# Patient Record
Sex: FEMALE | Race: BLACK OR AFRICAN AMERICAN | Employment: OTHER | URBAN - METROPOLITAN AREA
[De-identification: names, ages, dates, MRNs, and addresses within clinical notes are randomized per-mention and may not be internally consistent; named-entity substitution may affect disease eponyms.]

---

## 2017-02-17 ENCOUNTER — HOSPITAL ENCOUNTER (OUTPATIENT)
Dept: LAB | Age: 67
Discharge: HOME | End: 2017-02-17
Payer: MEDICARE

## 2017-02-17 PROCEDURE — 83036 HEMOGLOBIN GLYCOSYLATED A1C: CPT

## 2017-02-17 PROCEDURE — 36415 COLL VENOUS BLD VENIPUNCTURE: CPT

## 2017-02-17 PROCEDURE — 82043 UR ALBUMIN QUANTITATIVE: CPT

## 2017-02-17 PROCEDURE — 80061 LIPID PANEL: CPT

## 2017-02-17 PROCEDURE — 80053 COMPREHEN METABOLIC PANEL: CPT

## 2017-02-18 LAB
ALBUMIN SERPL-MCNC: 4.4 G/DL (ref 3.6–4.8)
ALBUMIN/CREAT UR: 40.9 MG/G CREAT (ref 0–30)
ALBUMIN/GLOB SERPL: 1.5 {RATIO} (ref 1.1–2.5)
ALP SERPL-CCNC: 189 IU/L (ref 39–117)
ALT SERPL-CCNC: 45 IU/L (ref 0–32)
AST SERPL-CCNC: 19 IU/L (ref 0–40)
BILIRUB SERPL-MCNC: 0.3 MG/DL (ref 0–1.2)
BUN SERPL-MCNC: 18 MG/DL (ref 8–27)
BUN/CREAT SERPL: 16 (ref 11–26)
CALCIUM SERPL-MCNC: 9.6 MG/DL (ref 8.7–10.3)
CHLORIDE SERPL-SCNC: 103 MMOL/L (ref 96–106)
CHOLEST SERPL-MCNC: 182 MG/DL (ref 100–199)
CO2 SERPL-SCNC: 21 MMOL/L (ref 18–29)
CREAT SERPL-MCNC: 1.12 MG/DL (ref 0.57–1)
CREAT UR-MCNC: 147.5 MG/DL
EST. AVERAGE GLUCOSE BLD GHB EST-MCNC: 166 MG/DL
GLOBULIN SER CALC-MCNC: 2.9 G/DL (ref 1.5–4.5)
GLUCOSE SERPL-MCNC: 179 MG/DL (ref 65–99)
HBA1C MFR BLD: 7.4 % (ref 4.8–5.6)
HDLC SERPL-MCNC: 47 MG/DL
INTERPRETATION, 910389: NORMAL
INTERPRETATION: NORMAL
LDLC SERPL CALC-MCNC: 114 MG/DL (ref 0–99)
Lab: NORMAL
MICROALBUMIN UR-MCNC: 60.4 UG/ML
PDF IMAGE, 910387: NORMAL
POTASSIUM SERPL-SCNC: 4.6 MMOL/L (ref 3.5–5.2)
PROT SERPL-MCNC: 7.3 G/DL (ref 6–8.5)
SODIUM SERPL-SCNC: 143 MMOL/L (ref 134–144)
TRIGL SERPL-MCNC: 104 MG/DL (ref 0–149)
VLDLC SERPL CALC-MCNC: 21 MG/DL (ref 5–40)

## 2017-02-24 ENCOUNTER — OFFICE VISIT (OUTPATIENT)
Dept: ENDOCRINOLOGY | Age: 67
End: 2017-02-24

## 2017-02-24 VITALS
SYSTOLIC BLOOD PRESSURE: 135 MMHG | HEIGHT: 62 IN | TEMPERATURE: 97.3 F | RESPIRATION RATE: 18 BRPM | DIASTOLIC BLOOD PRESSURE: 88 MMHG | BODY MASS INDEX: 41.77 KG/M2 | WEIGHT: 227 LBS | OXYGEN SATURATION: 98 % | HEART RATE: 86 BPM

## 2017-02-24 DIAGNOSIS — E78.2 MIXED HYPERLIPIDEMIA: ICD-10-CM

## 2017-02-24 DIAGNOSIS — I10 ESSENTIAL HYPERTENSION WITH GOAL BLOOD PRESSURE LESS THAN 130/80: ICD-10-CM

## 2017-02-24 DIAGNOSIS — E11.65 TYPE 2 DIABETES MELLITUS WITH HYPERGLYCEMIA, WITHOUT LONG-TERM CURRENT USE OF INSULIN (HCC): Primary | ICD-10-CM

## 2017-02-24 NOTE — PROGRESS NOTES
HISTORY OF PRESENT ILLNESS  Martina Ramsey is a 77 y.o. female. Patient here for f/u after last  visit of Type 2 diabetes mellitus  From 2016    Lost 1 lb    She is recognizing that she is addicted for sugars   She is taking 2 mg bydureon and glipizide   She is taking only  metformin       Rash completely cleared since stopping the [place] and [place]   She forgot the log       Old history :   Referred : by self/pcp  Pt of Dr. Binh Navarrete   He transferred care as Dr. Binh Navarrete left     H/o diabetes for 5  years     Current A1C is 6.9 % from 2013  and symptoms/problems include none     Current diabetic medications include glyburide 5 mg 2 pills a day  and victoza 1.8    Current monitoring regimen: home blood tests - 1 times daily  Home blood sugar records: trend: fluctuating a bit  Any episodes of hypoglycemia? no    Weight trend: increasing steadily  Prior visit with dietician: no  Current diet: \"unhealthy\" diet in general  Current exercise: no regular exercise    Known diabetic complications: none  Cardiovascular risk factors: dyslipidemia, diabetes mellitus, obesity, sedentary life style, hypertension, stress, post-menopausal    Eye exam current (within one year): yes  MI: no     Past Medical History:   Diagnosis Date    Diabetes mellitus ([facility] [address]      Past Surgical History:   Procedure Laterality Date    HX  SECTION      x2     Current Outpatient Prescriptions   Medication Sig    lisinopril (PRINIVIL, ZESTRIL) 10 mg tablet TAKE 1 TABLET EVERY DAY    clotrimazole (LOTRIMIN) 1 % topical cream APPLY TO THE AFFECTED & SURROUNDING AREAS OF SKIN TWICE DAILY FOR 14 DAYS. THEN USE AS NEEDED    fluocinoNIDE (LIDEX) 0.05 % topical cream     hydrocortisone (HYTONE) 2.5 % topical cream     metFORMIN ER 1,000 mg tr24 TAKE 1 TABLET TWICE DAILY *STOP GLUMETZA    exenatide microspheres (BYDUREON) 2 mg/0.65 mL pnij 2 mg by SubCUTAneous route every seven (7) days.     amLODIPine (NORVASC) 10 mg tablet TAKE 1 TABLET EVERY DAY    glipiZIDE (GLUCOTROL) 5 mg tablet TAKE 1 TABLET 20 MINUTES BEFORE BREAKFAST AND 2 TABLETS 20 MINS BEFORE DINNER    atorvastatin (LIPITOR) 40 mg tablet Take 1 Tab by mouth nightly. STOP 20 MG DOSE    CONTOUR NEXT STRIPS strip TEST TWICE A DAY    fluconazole (DIFLUCAN) 150 mg tablet Take 1 tablet daily for 3 days     No current facility-administered medications for this visit. Review of Systems   Constitutional: Negative. HENT: Negative. Eyes: Negative for pain and redness. Respiratory: Negative. Cardiovascular: Negative for chest pain, palpitations and leg swelling. Gastrointestinal: Negative. Negative for constipation. Genitourinary: Negative. Musculoskeletal: Negative for myalgias. Skin: Negative. Neurological: Negative. Endo/Heme/Allergies: Negative. Psychiatric/Behavioral: Negative for depression and memory loss. The patient does not have insomnia. Physical Exam   Constitutional: She is oriented to person, place, and time. She appears well-developed and well-nourished. HENT:   Head: Normocephalic. Eyes: Conjunctivae and EOM are normal. Pupils are equal, round, and reactive to light. Neck: Normal range of motion. Neck supple. No JVD present. No tracheal deviation present. No thyromegaly present. Cardiovascular: Normal rate, regular rhythm and normal heart sounds. No murmur heard. Pulmonary/Chest: Breath sounds normal.   Abdominal: Soft. Bowel sounds are normal.   Musculoskeletal: Normal range of motion. Lymphadenopathy:     She has no cervical adenopathy. Neurological: She is alert and oriented to person, place, and time. She has normal reflexes. Skin: Skin is warm. Psychiatric: She has a normal mood and affect.    Diabetic feet exam : aug 2015     H/o partial or complete amputation of foot : n  H/o previous foot ulceration: n  H/o pre - ulcerative callus : n  H/o peripheral neuropathy and callus : n  H/o poor circulation     MI   : n  Foot deformity : n          Lab Results   Component Value Date/Time    Hemoglobin A1c 7.4 02/17/2017 09:30 AM    Hemoglobin A1c 6.9 11/17/2016 08:47 AM    Hemoglobin A1c 7.4 08/19/2016 09:00 AM    Glucose 179 02/17/2017 09:30 AM    Glucose  05/05/2015 09:18 AM    Microalb/Creat ratio (ug/mg creat.) 40.9 02/17/2017 09:30 AM    LDL, calculated 114 02/17/2017 09:30 AM    Creatinine 1.12 02/17/2017 09:30 AM      Lab Results   Component Value Date/Time    Cholesterol, total 182 02/17/2017 09:30 AM    HDL Cholesterol 47 02/17/2017 09:30 AM    LDL, calculated 114 02/17/2017 09:30 AM    Triglyceride 104 02/17/2017 09:30 AM     Lab Results   Component Value Date/Time    ALT (SGPT) 45 02/17/2017 09:30 AM    AST (SGOT) 19 02/17/2017 09:30 AM    Alk. phosphatase 189 02/17/2017 09:30 AM    Bilirubin, total 0.3 02/17/2017 09:30 AM     Lab Results   Component Value Date/Time    GFR est AA 59 02/17/2017 09:30 AM    GFR est non-AA 51 02/17/2017 09:30 AM    Creatinine 1.12 02/17/2017 09:30 AM    BUN 18 02/17/2017 09:30 AM    Sodium 143 02/17/2017 09:30 AM    Potassium 4.6 02/17/2017 09:30 AM    Chloride 103 02/17/2017 09:30 AM    CO2 21 02/17/2017 09:30 AM      Lab Results   Component Value Date/Time    TSH 1.350 04/28/2015 10:03 AM    T4, Free 1.18 04/28/2015 10:03 AM            ASSESSMENT and PLAN      1. Type 2 DM uncontrolled : A1c is  7.4 %     From    Feb 2017   Compared to   6.9 %    From nov 2016  Compared to  7.4 %    From aug 2016  Compared to 8.3 %     From may 2016  compared t o  7.1 %     From feb 2016  Compared to    8.2 %     From nov 2015 compared to    7.1  %      From aug 2015 compared to  7 % again from April 2015 compared to   From     Jan 2015 compared to    7.2 %    From oct 2014 compared to   6.6 %  From July 2014 compared to   7.8 %  From April 2014 compared to  6.9 % from nov 2013    She used to be  on glyburide 10 mg a day and victoza -- from Dr. Jennifer Walter      better  Control , dietary  compliance - she is trying   Stay on  bydureon    Changed from  [address]   because of medicare to metformin ER 1 gm bid with meals  on glipizide 5 mg and 10 mg at dinner, explained her how it works and asked her to raise dose   She is better compliant with diet- eats potatoes , biscuits     Stopped jardiance for increased frequency of urination  Not even low dose invokana     She started  on  januvia 100 mg a day  Aug 2016 , and had to be stopped for RASH  She is currently taking only metformin er only once  A day     Explained the medication actions    Patient is advised to check blood sugars 1-2 times daily by rotation method. reviewed medications and side effects in detail  I suggested following options to improve fasting sugars     Explained its benefits and risks  lab results and schedule of future lab studies reviewed with patient    2. Hypoglycemia :  Educated on treating the hypoglycemia. 3. HTN :  well controlled , continue on norvasc 10 mg and lisinopril 10 mg   Procardia to be d/c d. ..for xtreme dizziness   Patient is educated about importance of compliance with anti-hypertensives especially ARB/ACEI    4. Dyslipidemia : LDL over 120  , lipitor to increase to 40 mg hs and she stopped it for muscle aches  Asking her to retry    lipitor or crestor or livalu to be considered , but now she is on medicare         5. use of aspirin to prevent MI and TIA's discussed      6. Menopause vasomotor symptoms  : she has none of them this time   Likely she was hypoglycemic from taking glipizide incorrectly    She passed stress test       Obesity : Body mass index is 41.52 kg/(m^2).         > 50 % of time is spent on counseling

## 2017-02-24 NOTE — PATIENT INSTRUCTIONS
lipitor at   40 mg at night       Metformin Er 1 gm twice a day with meals     glipizide 10 mg twenty minutes before b-fast and 10 mg twenty minutes before dinner     Stay on  bydureon 2 mg a day        Do not skip meals  Do not eat in between meals    Reduce carbs- pasta, rice, potatoes, bread   Do not drink juices or sodas  Donot eat peanut butter and biscuits     Do not eat sugar free cookies and cakes   Do not eat peaches, oranges, grapes and pine apples

## 2017-02-24 NOTE — MR AVS SNAPSHOT
Visit Information Date & Time Provider Department Dept. Phone Encounter #  
 2/24/2017  9:45 AM Tomas Chan MD [facility] Diabetes & Endocrinology [phone] [id number] Follow-up Instructions Return in about 3 months (around 5/24/2017). Upcoming Health Maintenance Date Due Hepatitis C Screening 1950 DTaP/Tdap/Td series (1 - Tdap) 7/16/1971 BREAST CANCER SCRN MAMMOGRAM 7/16/2000 FOBT Q 1 YEAR AGE 50-75 7/16/2000 ZOSTER VACCINE AGE 60> 7/16/2010 FOOT EXAM Q1 4/17/2015 EYE EXAM RETINAL OR DILATED Q1 6/16/2015 GLAUCOMA SCREENING Q2Y 7/16/2015 OSTEOPOROSIS SCREENING (DEXA) 7/16/2015 Pneumococcal 65+ Low/Medium Risk (1 of 2 - PCV13) 7/16/2015 MEDICARE YEARLY EXAM 7/16/2015 INFLUENZA AGE 9 TO ADULT 8/1/2016 HEMOGLOBIN A1C Q6M 8/17/2017 MICROALBUMIN Q1 2/17/2018 LIPID PANEL Q1 2/17/2018 Allergies as of 2/24/2017  Review Complete On: 2/24/2017 By: Tomas Chan MD  
  
 Severity Noted Reaction Type Reactions Januvia [Sitagliptin]  11/30/2016    Hives Stopped for bad rash Current Immunizations  Never Reviewed No immunizations on file. Not reviewed this visit Vitals BP  
  
  
  
  
  
 135/88 BMI and BSA Data Body Mass Index Body Surface Area 41.52 kg/m 2 2.12 m 2 Preferred Pharmacy Pharmacy Name Phone [id number] [place] [phone] Your Updated Medication List  
  
   
This list is accurate as of: 2/24/17 10:44 AM.  Always use your most recent med list. amLODIPine 10 mg tablet Commonly known as:  Kwesi Presser TAKE 1 TABLET EVERY DAY  
  
 atorvastatin 40 mg tablet Commonly known as:  LIPITOR Take 1 Tab by mouth nightly.  STOP 20 MG DOSE  
  
 clotrimazole 1 % topical cream  
Commonly known as:  Julianna Lopez  
 APPLY TO THE AFFECTED & SURROUNDING AREAS OF SKIN TWICE DAILY FOR 14 DAYS. THEN USE AS NEEDED CONTOUR NEXT STRIPS strip Generic drug:  glucose blood VI test strips TEST TWICE A DAY  
  
 exenatide microspheres 2 mg/0.65 mL Pnij Commonly known as:  BYDUREON  
2 mg by SubCUTAneous route every seven (7) days. fluconazole 150 mg tablet Commonly known as:  DIFLUCAN Take 1 tablet daily for 3 days  
  
 fluocinoNIDE 0.05 % topical cream  
Commonly known as:  LIDEX  
  
 glipiZIDE 5 mg tablet Commonly known as:  GLUCOTROL  
TAKE 1 TABLET 20 MINUTES BEFORE BREAKFAST AND 2 TABLETS 20 MINS BEFORE DINNER  
  
 hydrocortisone 2.5 % topical cream  
Commonly known as:  HYTONE  
  
 lisinopril 10 mg tablet Commonly known as:  PRINIVIL, ZESTRIL  
TAKE 1 TABLET EVERY DAY  
  
 metFORMIN ER 1,000 mg Tr24 TAKE 1 TABLET TWICE DAILY *Wagner Laughlin Follow-up Instructions Return in about 3 months (around 5/24/2017). Patient Instructions   
 
 lipitor at   40 mg at night Metformin Er 1 gm twice a day with meals  
 
glipizide 10 mg twenty minutes before b-fast and 10 mg twenty minutes before dinner Stay on  bydureon 2 mg a day Do not skip meals Do not eat in between meals Reduce carbs- pasta, rice, potatoes, bread Do not drink juices or sodas Donot eat peanut butter and biscuits Do not eat sugar free cookies and cakes Do not eat peaches, oranges, grapes and pine apples Introducing [facility]! Dajuan Oliva introduces Blushr patient portal. Now you can access parts of your medical record, email your doctor's office, and request medication refills online. 1. In your internet browser, go to [email or web address] 2. Click on the First Time User? Click Here link in the Sign In box. You will see the New Member Sign Up page. 3. Enter your Blushr Access Code exactly as it appears below.  You will not need to use this code after youve completed the sign-up process. If you do not sign up before the expiration date, you must request a new code. · MESoft Access Code: [email or web address] Expires: 2/28/2017 10:24 AM 
 
4. Enter the last four digits of your Social Security Number (xxxx) and Date of Birth (mm/dd/yyyy) as indicated and click Submit. You will be taken to the next sign-up page. 5. Create a MESoft ID. This will be your MESoft login ID and cannot be changed, so think of one that is secure and easy to remember. 6. Create a MESoft password. You can change your password at any time. 7. Enter your Password Reset Question and Answer. This can be used at a later time if you forget your password. 8. Enter your e-mail address. You will receive e-mail notification when new information is available in [address] 9. Click Sign Up. You can now view and download portions of your medical record. 10. Click the Download Summary menu link to download a portable copy of your medical information. If you have questions, please visit the Frequently Asked Questions section of the [employer] website. Remember, MESoft is NOT to be used for urgent needs. For medical emergencies, dial 911. Now available from your iPhone and Android! Please provide this summary of care documentation to your next provider. Your primary care clinician is listed as [address]. If you have any questions after today's visit, please call [phone].

## 2017-02-24 NOTE — PROGRESS NOTES
Wt Readings from Last 3 Encounters:   02/24/17 227 lb (103 kg)   11/30/16 220 lb (99.8 kg)   08/26/16 219 lb (99.3 kg)     Temp Readings from Last 3 Encounters:   02/24/17 97.3 °F (36.3 °C) (Oral)   11/30/16 97.5 °F (36.4 °C) (Oral)   08/26/16 96.6 °F (35.9 °C) (Oral)     BP Readings from Last 3 Encounters:   02/24/17 135/88   11/30/16 142/87   08/26/16 141/83     Pulse Readings from Last 3 Encounters:   02/24/17 86   11/30/16 76   08/26/16 73     Lab Results   Component Value Date/Time    Hemoglobin A1c 7.4 02/17/2017 09:30 AM    Hemoglobin A1c (POC) 7.8 04/21/2014 01:34 PM     Last Podiatry Nov 2016  Last Eye exam Dec 2016

## 2017-03-27 DIAGNOSIS — E11.9 DIABETES MELLITUS WITHOUT COMPLICATION (HCC): ICD-10-CM

## 2017-03-27 RX ORDER — EXENATIDE 2 MG/.65ML
INJECTION, SUSPENSION, EXTENDED RELEASE SUBCUTANEOUS
Qty: 2.6 ML | Refills: 6 | Status: SHIPPED | OUTPATIENT
Start: 2017-03-27 | End: 2017-11-13 | Stop reason: SDUPTHER

## 2017-03-29 ENCOUNTER — OP HISTORICAL/CONVERTED ENCOUNTER (OUTPATIENT)
Dept: OTHER | Age: 67
End: 2017-03-29

## 2017-04-12 RX ORDER — METFORMIN HYDROCHLORIDE EXTENDED-RELEASE TABLETS 1000 MG/1
TABLET, FILM COATED, EXTENDED RELEASE ORAL
Qty: 60 TAB | Refills: 4 | Status: SHIPPED | OUTPATIENT
Start: 2017-04-12 | End: 2017-05-24 | Stop reason: SDUPTHER

## 2017-05-12 ENCOUNTER — TELEPHONE (OUTPATIENT)
Dept: ENDOCRINOLOGY | Age: 67
End: 2017-05-12

## 2017-05-22 ENCOUNTER — HOSPITAL ENCOUNTER (OUTPATIENT)
Dept: LAB | Age: 67
Discharge: HOME | End: 2017-05-22
Payer: MEDICARE

## 2017-05-22 PROCEDURE — 80053 COMPREHEN METABOLIC PANEL: CPT

## 2017-05-22 PROCEDURE — 82043 UR ALBUMIN QUANTITATIVE: CPT

## 2017-05-22 PROCEDURE — 80061 LIPID PANEL: CPT

## 2017-05-22 PROCEDURE — 36415 COLL VENOUS BLD VENIPUNCTURE: CPT

## 2017-05-22 PROCEDURE — 83036 HEMOGLOBIN GLYCOSYLATED A1C: CPT

## 2017-05-24 ENCOUNTER — OFFICE VISIT (OUTPATIENT)
Dept: ENDOCRINOLOGY | Age: 67
End: 2017-05-24

## 2017-05-24 VITALS
TEMPERATURE: 97.6 F | SYSTOLIC BLOOD PRESSURE: 162 MMHG | HEART RATE: 74 BPM | DIASTOLIC BLOOD PRESSURE: 86 MMHG | OXYGEN SATURATION: 97 % | BODY MASS INDEX: 41.77 KG/M2 | WEIGHT: 227 LBS | HEIGHT: 62 IN | RESPIRATION RATE: 18 BRPM

## 2017-05-24 DIAGNOSIS — E78.2 MIXED HYPERLIPIDEMIA: ICD-10-CM

## 2017-05-24 DIAGNOSIS — E11.65 TYPE 2 DIABETES MELLITUS WITH HYPERGLYCEMIA, WITHOUT LONG-TERM CURRENT USE OF INSULIN (HCC): Primary | ICD-10-CM

## 2017-05-24 DIAGNOSIS — I10 ESSENTIAL HYPERTENSION: ICD-10-CM

## 2017-05-24 DIAGNOSIS — I10 ESSENTIAL HYPERTENSION WITH GOAL BLOOD PRESSURE LESS THAN 130/80: ICD-10-CM

## 2017-05-24 RX ORDER — GLIPIZIDE 5 MG/1
TABLET ORAL
Qty: 120 TAB | Refills: 6 | Status: SHIPPED | OUTPATIENT
Start: 2017-05-24 | End: 2018-06-10 | Stop reason: SDUPTHER

## 2017-05-24 RX ORDER — ATORVASTATIN CALCIUM 40 MG/1
40 TABLET, FILM COATED ORAL
Qty: 30 TAB | Refills: 6 | Status: SHIPPED | OUTPATIENT
Start: 2017-05-24 | End: 2018-02-27 | Stop reason: SDUPTHER

## 2017-05-24 RX ORDER — METFORMIN HYDROCHLORIDE EXTENDED-RELEASE TABLETS 1000 MG/1
TABLET, FILM COATED, EXTENDED RELEASE ORAL
Qty: 60 TAB | Refills: 6 | Status: SHIPPED | OUTPATIENT
Start: 2017-05-24 | End: 2017-09-28 | Stop reason: SDUPTHER

## 2017-05-24 NOTE — PROGRESS NOTES
HISTORY OF PRESENT ILLNESS  Rekha Jeffers is a 77 y.o. female. Patient here for f/u after last  visit of Type 2 diabetes mellitus  From 2017         Stable weight   She is recognizing that she is addicted for sugars     She is taking 2 mg bydureon and   glipizide 10 mg and 5 mg   She is taking only  metformin     She got the log       Old history :   Referred : by self/pcp  Pt of Dr. Neno Contreras   He transferred care as Dr. Neno Contreras left     H/o diabetes for 5  years     Current A1C is 6.9 % from 2013  and symptoms/problems include none     Current diabetic medications include glyburide 5 mg 2 pills a day  and victoza 1.8    Current monitoring regimen: home blood tests - 1 times daily  Home blood sugar records: trend: fluctuating a bit  Any episodes of hypoglycemia? no    Weight trend: increasing steadily  Prior visit with dietician: no  Current diet: \"unhealthy\" diet in general  Current exercise: no regular exercise    Known diabetic complications: none  Cardiovascular risk factors: dyslipidemia, diabetes mellitus, obesity, sedentary life style, hypertension, stress, post-menopausal    Eye exam current (within one year): yes  MI: no     Past Medical History:   Diagnosis Date    Diabetes mellitus ([facility] [address]      Past Surgical History:   Procedure Laterality Date    HX  SECTION      x2     Current Outpatient Prescriptions   Medication Sig    metFORMIN ER 1,000 mg tr24 TAKE 1 TABLET TWICE DAILY    BYDUREON 2 mg/0.65 mL pnij INJECT 2 MG SUBCUTANEOUSLY EVERY 7 DAYS    amLODIPine (NORVASC) 10 mg tablet TAKE 1 TABLET EVERY DAY    lisinopril (PRINIVIL, ZESTRIL) 10 mg tablet TAKE 1 TABLET EVERY DAY    clotrimazole (LOTRIMIN) 1 % topical cream APPLY TO THE AFFECTED & SURROUNDING AREAS OF SKIN TWICE DAILY FOR 14 DAYS.  THEN USE AS NEEDED    fluocinoNIDE (LIDEX) 0.05 % topical cream     hydrocortisone (HYTONE) 2.5 % topical cream     metFORMIN ER 1,000 mg tr24 TAKE 1 TABLET TWICE DAILY *STOP GLUMETZA    glipiZIDE (GLUCOTROL) 5 mg tablet TAKE 1 TABLET 20 MINUTES BEFORE BREAKFAST AND 2 TABLETS 20 MINS BEFORE DINNER    atorvastatin (LIPITOR) 40 mg tablet Take 1 Tab by mouth nightly. STOP 20 MG DOSE    CONTOUR NEXT STRIPS strip TEST TWICE A DAY    fluconazole (DIFLUCAN) 150 mg tablet Take 1 tablet daily for 3 days     No current facility-administered medications for this visit. Review of Systems   Constitutional: Negative. HENT: Negative. Eyes: Negative for pain and redness. Respiratory: Negative. Cardiovascular: Negative for chest pain, palpitations and leg swelling. Gastrointestinal: Negative. Negative for constipation. Genitourinary: Negative. Musculoskeletal: Negative for myalgias. Skin: Negative. Neurological: Negative. Endo/Heme/Allergies: Negative. Psychiatric/Behavioral: Negative for depression and memory loss. The patient does not have insomnia. Physical Exam   Constitutional: She is oriented to person, place, and time. She appears well-developed and well-nourished. HENT:   Head: Normocephalic. Eyes: Conjunctivae and EOM are normal. Pupils are equal, round, and reactive to light. Neck: Normal range of motion. Neck supple. No JVD present. No tracheal deviation present. No thyromegaly present. Cardiovascular: Normal rate, regular rhythm and normal heart sounds. No murmur heard. Pulmonary/Chest: Breath sounds normal.   Abdominal: Soft. Bowel sounds are normal.   Musculoskeletal: Normal range of motion. Lymphadenopathy:     She has no cervical adenopathy. Neurological: She is alert and oriented to person, place, and time. She has normal reflexes. Skin: Skin is warm. Psychiatric: She has a normal mood and affect.    Diabetic feet exam : may 2017     H/o partial or complete amputation of foot : n  H/o previous foot ulceration: n  H/o pre - ulcerative callus : n  H/o peripheral neuropathy and callus : n  H/o poor circulation     MI   : n  Foot deformity : n          Lab Results   Component Value Date/Time    Hemoglobin A1c 7.5 05/22/2017 01:34 PM    Hemoglobin A1c 7.4 02/17/2017 09:30 AM    Hemoglobin A1c 6.9 11/17/2016 08:47 AM    Glucose 174 05/22/2017 01:34 PM    Glucose  05/05/2015 09:18 AM    Microalb/Creat ratio (ug/mg creat.) 59.1 05/22/2017 01:34 PM    LDL, calculated 97 05/22/2017 01:34 PM    Creatinine 1.05 05/22/2017 01:34 PM      Lab Results   Component Value Date/Time    Cholesterol, total 173 05/22/2017 01:34 PM    HDL Cholesterol 54 05/22/2017 01:34 PM    LDL, calculated 97 05/22/2017 01:34 PM    Triglyceride 108 05/22/2017 01:34 PM     Lab Results   Component Value Date/Time    ALT (SGPT) 20 05/22/2017 01:34 PM    AST (SGOT) 15 05/22/2017 01:34 PM    Alk. phosphatase 159 05/22/2017 01:34 PM    Bilirubin, total 0.3 05/22/2017 01:34 PM     Lab Results   Component Value Date/Time    GFR est AA 64 05/22/2017 01:34 PM    GFR est non-AA 55 05/22/2017 01:34 PM    Creatinine 1.05 05/22/2017 01:34 PM    BUN 19 05/22/2017 01:34 PM    Sodium 142 05/22/2017 01:34 PM    Potassium 4.7 05/22/2017 01:34 PM    Chloride 101 05/22/2017 01:34 PM    CO2 21 05/22/2017 01:34 PM      Lab Results   Component Value Date/Time    TSH 1.350 04/28/2015 10:03 AM    T4, Free 1.18 04/28/2015 10:03 AM            ASSESSMENT and PLAN      1. Type 2 DM uncontrolled : A1c is   7.5  %     From  May 2017     compared to    7.4 %     From    Feb 2017   Compared to   6.9 %    From nov 2016  Compared to  7.4 %    From aug 2016  Compared to 8.3 %     From may 2016  compared t o  7.1 %     From feb 2016  Compared to    8.2 %     From nov 2015 compared to    7.1  %      From aug 2015 compared to  7 % again from April 2015 compared to   From     Jan 2015 compared to    7.2 %    From oct 2014 compared to   6.6 %  From July 2014 compared to   7.8 %  From April 2014 compared to  6.9 % from nov 2013    She used to be  on glyburide 10 mg a day and victoza -- from Dr. Queenie Quintero better  Control , dietary  compliance - she is trying   Stay on  bydureon  Changed from  [address] [address]   because of medicare to metformin ER 1 gm bid with meals     on glipizide 5 mg and 10 mg at dinner, explained her how it works and asked her to raise dose which she did not   Asking her to raise to 10 mg bid again may 2017  She No longer compliant with diet- eating desserts     Stopped jardiance for increased frequency of urination  Not even low dose invokana     She started  on  januvia 100 mg a day  Aug 2016 , and had to be stopped for RASH  She is currently taking only metformin er only once  A day     Explained the medication actions    Patient is advised to check blood sugars 1-2 times daily by rotation method. reviewed medications and side effects in detail  I suggested following options to improve fasting sugars     Explained its benefits and risks  lab results and schedule of future lab studies reviewed with patient    2. Hypoglycemia :  Educated on treating the hypoglycemia. 3. HTN :  well controlled , continue on norvasc 10 mg and lisinopril 10 mg   Procardia to be d/c d. ..for xtreme dizziness   Patient is educated about importance of compliance with anti-hypertensives especially ARB/ACEI    4. Dyslipidemia : LDL down to 99   , better than  120  ,   lipitor increased  to 40 mg hs , agrees to non compliance   lipitor or crestor or livalu to be considered , but now she is on medicare       5. use of aspirin to prevent MI and TIA's discussed      6. Menopause vasomotor symptoms  : she has none of them this time   Likely she was hypoglycemic from taking glipizide incorrectly    She passed stress test       7. Obesity : Body mass index is 41.52 kg/(m^2).   She enjoys food,  And she is on bydureon      > 50 % of time is spent on counseling

## 2017-05-24 NOTE — PROGRESS NOTES
Wt Readings from Last 3 Encounters:   05/24/17 227 lb (103 kg)   02/24/17 227 lb (103 kg)   11/30/16 220 lb (99.8 kg)     Temp Readings from Last 3 Encounters:   05/24/17 97.6 °F (36.4 °C) (Oral)   02/24/17 97.3 °F (36.3 °C) (Oral)   11/30/16 97.5 °F (36.4 °C) (Oral)     BP Readings from Last 3 Encounters:   05/24/17 162/86   02/24/17 135/88   11/30/16 142/87     Pulse Readings from Last 3 Encounters:   05/24/17 74   02/24/17 86   11/30/16 76     Lab Results   Component Value Date/Time    Hemoglobin A1c 7.5 05/22/2017 01:34 PM    Hemoglobin A1c (POC) 7.8 04/21/2014 01:34 PM     Last Podiatry Nov 2016  Last Eye exam Jan 2017

## 2017-07-13 RX ORDER — LISINOPRIL 10 MG/1
TABLET ORAL
Qty: 30 TAB | Refills: 6 | Status: SHIPPED | OUTPATIENT
Start: 2017-07-13 | End: 2018-02-11 | Stop reason: SDUPTHER

## 2017-08-07 DIAGNOSIS — I10 ESSENTIAL HYPERTENSION, BENIGN: ICD-10-CM

## 2017-08-07 RX ORDER — AMLODIPINE BESYLATE 10 MG/1
TABLET ORAL
Qty: 30 TAB | Refills: 4 | Status: SHIPPED | OUTPATIENT
Start: 2017-08-07 | End: 2018-01-08 | Stop reason: SDUPTHER

## 2017-09-21 ENCOUNTER — HOSPITAL ENCOUNTER (OUTPATIENT)
Dept: LAB | Age: 67
Discharge: HOME | End: 2017-09-21
Payer: MEDICARE

## 2017-09-21 PROCEDURE — 80061 LIPID PANEL: CPT

## 2017-09-21 PROCEDURE — 82043 UR ALBUMIN QUANTITATIVE: CPT

## 2017-09-21 PROCEDURE — 36415 COLL VENOUS BLD VENIPUNCTURE: CPT

## 2017-09-21 PROCEDURE — 83036 HEMOGLOBIN GLYCOSYLATED A1C: CPT

## 2017-09-21 PROCEDURE — 80053 COMPREHEN METABOLIC PANEL: CPT

## 2017-09-21 RX ORDER — GLIPIZIDE 5 MG/1
TABLET ORAL
Qty: 90 TAB | Refills: 4 | Status: SHIPPED | OUTPATIENT
Start: 2017-09-21 | End: 2017-09-28 | Stop reason: SDUPTHER

## 2017-09-28 ENCOUNTER — OFFICE VISIT (OUTPATIENT)
Dept: ENDOCRINOLOGY | Age: 67
End: 2017-09-28

## 2017-09-28 VITALS
RESPIRATION RATE: 12 BRPM | HEIGHT: 62 IN | SYSTOLIC BLOOD PRESSURE: 132 MMHG | WEIGHT: 225 LBS | DIASTOLIC BLOOD PRESSURE: 77 MMHG | TEMPERATURE: 97.4 F | HEART RATE: 82 BPM | BODY MASS INDEX: 41.41 KG/M2

## 2017-09-28 DIAGNOSIS — E11.65 TYPE 2 DIABETES MELLITUS WITH HYPERGLYCEMIA, WITHOUT LONG-TERM CURRENT USE OF INSULIN (HCC): Primary | ICD-10-CM

## 2017-09-28 DIAGNOSIS — E78.2 MIXED HYPERLIPIDEMIA: ICD-10-CM

## 2017-09-28 DIAGNOSIS — I10 ESSENTIAL HYPERTENSION: ICD-10-CM

## 2017-09-28 NOTE — MR AVS SNAPSHOT
Visit Information Date & Time Provider Department Dept. Phone Encounter #  
 9/28/2017 10:30 AM Dennis Moore MD [facility] [phone] [id number] Follow-up Instructions Return in about 6 months (around 3/28/2018). Upcoming Health Maintenance Date Due Hepatitis C Screening 1950 DTaP/Tdap/Td series (1 - Tdap) 7/16/1971 BREAST CANCER SCRN MAMMOGRAM 7/16/2000 FOBT Q 1 YEAR AGE 50-75 7/16/2000 ZOSTER VACCINE AGE 60> 5/16/2010 FOOT EXAM Q1 4/17/2015 EYE EXAM RETINAL OR DILATED Q1 6/16/2015 GLAUCOMA SCREENING Q2Y 7/16/2015 OSTEOPOROSIS SCREENING (DEXA) 7/16/2015 Pneumococcal 65+ Low/Medium Risk (1 of 2 - PCV13) 7/16/2015 MEDICARE YEARLY EXAM 7/16/2015 INFLUENZA AGE 9 TO ADULT 8/1/2017 HEMOGLOBIN A1C Q6M 3/21/2018 MICROALBUMIN Q1 9/21/2018 LIPID PANEL Q1 9/21/2018 Allergies as of 9/28/2017  Review Complete On: 9/28/2017 By: Dennis Moore MD  
  
 Severity Noted Reaction Type Reactions Januvia [Sitagliptin]  11/30/2016    Hives Stopped for bad rash Current Immunizations  Never Reviewed No immunizations on file. Not reviewed this visit You Were Diagnosed With   
  
 Codes Comments Type 2 diabetes mellitus with hyperglycemia, without long-term current use of insulin (HCC)    -  Primary ICD-10-CM: E11.65 ICD-9-CM: 250.00, 790.29 Essential hypertension     ICD-10-CM: I10 
ICD-9-CM: 401.9 Mixed hyperlipidemia     ICD-10-CM: E78.2 ICD-9-CM: 272.2 Vitals BP Pulse Temp Resp Height(growth percentile) Weight(growth percentile) 132/77 (BP 1 Location: Left arm, BP Patient Position: Sitting) 82 97.4 °F (36.3 °C) (Oral) 12 5' 2\" (1.575 m) 225 lb (102.1 kg) BMI OB Status Smoking Status 41.15 kg/m2 Postmenopausal Never Smoker BMI and BSA Data Body Mass Index Body Surface Area  
 41.15 kg/m 2 2.11 m 2 Preferred Pharmacy Pharmacy Name Phone [facility][id number]- [address] AT [address] [phone] Your Updated Medication List  
  
   
This list is accurate as of: 9/28/17 11:23 AM.  Always use your most recent med list. amLODIPine 10 mg tablet Commonly known as:  Kiersten Gables TAKE 1 TABLET EVERY DAY  
  
 atorvastatin 40 mg tablet Commonly known as:  LIPITOR Take 1 Tab by mouth nightly. STOP 20 MG DOSE BYDUREON 2 mg/0.65 mL Pnij Generic drug:  exenatide microspheres INJECT 2 MG SUBCUTANEOUSLY EVERY 7 DAYS  
  
 clotrimazole 1 % topical cream  
Commonly known as:  LOTRIMIN  
APPLY TO THE AFFECTED & SURROUNDING AREAS OF SKIN TWICE DAILY FOR 14 DAYS. THEN USE AS NEEDED CONTOUR NEXT STRIPS strip Generic drug:  glucose blood VI test strips TEST TWICE A DAY  
  
 fluconazole 150 mg tablet Commonly known as:  DIFLUCAN Take 1 tablet daily for 3 days  
  
 fluocinoNIDE 0.05 % topical cream  
Commonly known as:  LIDEX  
  
 glipiZIDE 5 mg tablet Commonly known as:  GLUCOTROL  
TAKE 2 TABLETS 20 MINUTES BEFORE BREAKFAST AND 2 TABLETS 20 MINS BEFORE DINNER  
  
 hydrocortisone 2.5 % topical cream  
Commonly known as:  HYTONE  
  
 lisinopril 10 mg tablet Commonly known as:  PRINIVIL, ZESTRIL  
TAKE 1 TABLET BY MOUTH EVERY DAY  
  
 metFORMIN ER 1,000 mg Tr24 TAKE 1 TABLET TWICE DAILY *Sandra Byers Follow-up Instructions Return in about 6 months (around 3/28/2018). Patient Instructions   
 
 lipitor at   40 mg at night Metformin Er 1 gm twice a day with meals  
 
glipizide 10 mg twenty minutes before b-fast and 10 mg twenty minutes before dinner Stay on  bydureon 2 mg a day Do not skip meals Do not eat in between meals Reduce carbs- pasta, rice, potatoes, bread Do not drink juices or sodas Donot eat peanut butter and biscuits Do not eat sugar free cookies and cakes Do not eat peaches, oranges, grapes and pine apples Introducing [facility]! Veronica Hay introduces [employer] patient portal. Now you can access parts of your medical record, email your doctor's office, and request medication refills online. 1. In your internet browser, go to [email or web address] 2. Click on the First Time User? Click Here link in the Sign In box. You will see the New Member Sign Up page. 3. Enter your Keeg[employer] Access Code exactly as it appears below. You will not need to use this code after youve completed the sign-up process. If you do not sign up before the expiration date, you must request a new code. · [employer] Access Code: [email or web address] Expires: 12/27/2017 11:23 AM 
 
4. Enter the last four digits of your Social Security Number (xxxx) and Date of Birth (mm/dd/yyyy) as indicated and click Submit. You will be taken to the next sign-up page. 5. Create a Keegy ID. This will be your Keegy login ID and cannot be changed, so think of one that is secure and easy to remember. 6. Create a Keegy password. You can change your password at any time. 7. Enter your Password Reset Question and Answer. This can be used at a later time if you forget your password. 8. Enter your e-mail address. You will receive e-mail notification when new information is available in [address] 9. Click Sign Up. You can now view and download portions of your medical record. 10. Click the Download Summary menu link to download a portable copy of your medical information. If you have questions, please visit the Frequently Asked Questions section of the Keeg[employer] website. Remember, [employer] is NOT to be used for urgent needs. For medical emergencies, dial 911. Now available from your iPhone and Android! Please provide this summary of care documentation to your next provider. Your primary care clinician is listed as [address].  If you have any questions after today's visit, please call [phone].

## 2017-09-28 NOTE — PROGRESS NOTES
Wt Readings from Last 3 Encounters:   09/28/17 225 lb (102.1 kg)   05/24/17 227 lb (103 kg)   02/24/17 227 lb (103 kg)     Temp Readings from Last 3 Encounters:   09/28/17 97.4 °F (36.3 °C) (Oral)   05/24/17 97.6 °F (36.4 °C) (Oral)   02/24/17 97.3 °F (36.3 °C) (Oral)     BP Readings from Last 3 Encounters:   09/28/17 132/77   05/24/17 162/86   02/24/17 135/88     Pulse Readings from Last 3 Encounters:   09/28/17 82   05/24/17 74   02/24/17 86     Lab Results   Component Value Date/Time    Hemoglobin A1c 7.7 09/21/2017 09:29 AM    Hemoglobin A1c (POC) 7.8 04/21/2014 01:34 PM

## 2017-09-28 NOTE — PROGRESS NOTES
HISTORY OF PRESENT ILLNESS  Мария Ortiz is a 79 y.o. female. Patient here for f/u after last  visit of Type 2 diabetes mellitus  From  May  2017       Lost 2 lbs   She is recognizing that she is addicted for sugars     She is taking 2 mg bydureon and   glipizide 10 mg and 5 mg   She is taking only  metformin     She got the log     She went to vacation      Old history :   Referred : by self/pcp  Pt of Dr. Leandra Greene   He transferred care as Dr. Leandra Greene left     H/o diabetes for 5  years     Current A1C is 6.9 % from 2013  and symptoms/problems include none     Current diabetic medications include glyburide 5 mg 2 pills a day  and victoza 1.8    Current monitoring regimen: home blood tests - 1 times daily  Home blood sugar records: trend: fluctuating a bit  Any episodes of hypoglycemia? no    Weight trend: increasing steadily  Prior visit with dietician: no  Current diet: \"unhealthy\" diet in general  Current exercise: no regular exercise    Known diabetic complications: none  Cardiovascular risk factors: dyslipidemia, diabetes mellitus, obesity, sedentary life style, hypertension, stress, post-menopausal    Eye exam current (within one year): yes  MI: no     Past Medical History:   Diagnosis Date    Diabetes mellitus ([facility] [address]      Past Surgical History:   Procedure Laterality Date    HX  SECTION      x2     Current Outpatient Prescriptions   Medication Sig    amLODIPine (NORVASC) 10 mg tablet TAKE 1 TABLET EVERY DAY    lisinopril (PRINIVIL, ZESTRIL) 10 mg tablet TAKE 1 TABLET BY MOUTH EVERY DAY    atorvastatin (LIPITOR) 40 mg tablet Take 1 Tab by mouth nightly. STOP 20 MG DOSE    glipiZIDE (GLUCOTROL) 5 mg tablet TAKE 2 TABLETS 20 MINUTES BEFORE BREAKFAST AND 2 TABLETS 20 MINS BEFORE DINNER    BYDUREON 2 mg/0.65 mL pnnathalia INJECT 2 MG SUBCUTANEOUSLY EVERY 7 DAYS    clotrimazole (LOTRIMIN) 1 % topical cream APPLY TO THE AFFECTED & SURROUNDING AREAS OF SKIN TWICE DAILY FOR 14 DAYS.  THEN USE AS NEEDED  fluocinoNIDE (LIDEX) 0.05 % topical cream     hydrocortisone (HYTONE) 2.5 % topical cream     metFORMIN ER 1,000 mg tr24 TAKE 1 TABLET TWICE DAILY *STOP GLUMETZA    CONTOUR NEXT STRIPS strip TEST TWICE A DAY    fluconazole (DIFLUCAN) 150 mg tablet Take 1 tablet daily for 3 days     No current facility-administered medications for this visit. Review of Systems   Constitutional: Negative. HENT: Negative. Eyes: Negative for pain and redness. Respiratory: Negative. Cardiovascular: Negative for chest pain, palpitations and leg swelling. Gastrointestinal: Negative. Negative for constipation. Genitourinary: Negative. Musculoskeletal: Negative for myalgias. Skin: Negative. Neurological: Negative. Endo/Heme/Allergies: Negative. Psychiatric/Behavioral: Negative for depression and memory loss. The patient does not have insomnia. Physical Exam   Constitutional: She is oriented to person, place, and time. She appears well-developed and well-nourished. HENT:   Head: Normocephalic. Eyes: Conjunctivae and EOM are normal. Pupils are equal, round, and reactive to light. Neck: Normal range of motion. Neck supple. No JVD present. No tracheal deviation present. No thyromegaly present. Cardiovascular: Normal rate, regular rhythm and normal heart sounds. No murmur heard. Pulmonary/Chest: Breath sounds normal.   Abdominal: Soft. Bowel sounds are normal.   Musculoskeletal: Normal range of motion. Lymphadenopathy:     She has no cervical adenopathy. Neurological: She is alert and oriented to person, place, and time. She has normal reflexes. Skin: Skin is warm. Psychiatric: She has a normal mood and affect.    Diabetic feet exam : may 2017     H/o partial or complete amputation of foot : n  H/o previous foot ulceration: n  H/o pre - ulcerative callus : n  H/o peripheral neuropathy and callus : n  H/o poor circulation     MI   : n  Foot deformity : n          Lab Results Component Value Date/Time    Hemoglobin A1c 7.7 09/21/2017 09:29 AM    Hemoglobin A1c 7.5 05/22/2017 01:34 PM    Hemoglobin A1c 7.4 02/17/2017 09:30 AM    Glucose 192 09/21/2017 09:29 AM    Glucose  05/05/2015 09:18 AM    Microalb/Creat ratio (ug/mg creat.) 70.2 09/21/2017 09:29 AM    LDL, calculated 85 09/21/2017 09:29 AM    Creatinine 1.12 09/21/2017 09:29 AM      Lab Results   Component Value Date/Time    Cholesterol, total 150 09/21/2017 09:29 AM    HDL Cholesterol 46 09/21/2017 09:29 AM    LDL, calculated 85 09/21/2017 09:29 AM    Triglyceride 93 09/21/2017 09:29 AM     Lab Results   Component Value Date/Time    ALT (SGPT) 19 09/21/2017 09:29 AM    AST (SGOT) 14 09/21/2017 09:29 AM    Alk. phosphatase 174 09/21/2017 09:29 AM    Bilirubin, total 0.4 09/21/2017 09:29 AM     Lab Results   Component Value Date/Time    GFR est AA 59 09/21/2017 09:29 AM    GFR est non-AA 51 09/21/2017 09:29 AM    Creatinine 1.12 09/21/2017 09:29 AM    BUN 14 09/21/2017 09:29 AM    Sodium 143 09/21/2017 09:29 AM    Potassium 4.6 09/21/2017 09:29 AM    Chloride 104 09/21/2017 09:29 AM    CO2 26 09/21/2017 09:29 AM      Lab Results   Component Value Date/Time    TSH 1.350 04/28/2015 10:03 AM    T4, Free 1.18 04/28/2015 10:03 AM            ASSESSMENT and PLAN      1. Type 2 DM uncontrolled : A1c is  7.7 %     From sept 2017    compared to    7.5  %     From  May 2017     compared to    7.4 %     From    Feb 2017   Compared to   6.9 %    From nov 2016  Compared to  7.4 %    From aug 2016  Compared to 8.3 %     From may 2016  compared t o  7.1 %     From feb 2016  Compared to    8.2 %     From nov 2015 compared to    7.1  %      From aug 2015 compared to  7 % again from April 2015 compared to   From     Jan 2015 compared to    7.2 %    From oct 2014 compared to   6.6 %  From July 2014 compared to   7.8 %  From April 2014 compared to  6.9 % from nov 2013    She used to be  on glyburide 10 mg a day and victoza -- from Dr. Fatmata Craig better  Control , dietary  compliance - she is trying   Stay on  bydureon  Changed from  [address]   because of medicare to metformin ER 1 gm bid with meals     on glipizide 5 mg and 10 mg at dinner, explained her how it works and asked her to raise dose which she did not   raised  to 10 mg bid again may 2017    She No longer compliant with diet- eating desserts     Stopped jardiance for increased frequency of urination  Not even low dose invokana     She started  on  januvia 100 mg a day  Aug 2016 , and had to be stopped for RASH  She is currently taking only metformin er only once  A day     Explained the medication actions    Patient is advised to check blood sugars 1-2 times daily by rotation method. reviewed medications and side effects in detail  I suggested following options to improve fasting sugars     Explained its benefits and risks  lab results and schedule of future lab studies reviewed with patient    2. Hypoglycemia :  Educated on treating the hypoglycemia. 3. HTN :  well controlled , continue on norvasc 10 mg and lisinopril 10 mg   Proteinuria present   Procardia to be d/c d. ..for xtreme dizziness   Patient is educated about importance of compliance with anti-hypertensives especially ARB/ACEI    4. Dyslipidemia : LDL better with compliance   lipitor or crestor or livalu to be considered , but now she is on medicare       5. use of aspirin to prevent MI and TIA's discussed      6. Menopause vasomotor symptoms  : she has none of them this time   Likely she was hypoglycemic from taking glipizide incorrectly    She passed stress test       7. Obesity : Body mass index is 41.15 kg/(m^2).   She enjoys food,  And she is on bydureon      > 50 % of time is spent on counseling   Patient voiced understanding her plan of care

## 2017-11-03 RX ORDER — BLOOD SUGAR DIAGNOSTIC
STRIP MISCELLANEOUS
Qty: 100 STRIP | Refills: 3 | Status: SHIPPED | OUTPATIENT
Start: 2017-11-03 | End: 2018-04-30 | Stop reason: SDUPTHER

## 2017-11-13 DIAGNOSIS — E11.9 DIABETES MELLITUS WITHOUT COMPLICATION (HCC): ICD-10-CM

## 2017-11-13 RX ORDER — EXENATIDE 2 MG/.65ML
INJECTION, SUSPENSION, EXTENDED RELEASE SUBCUTANEOUS
Qty: 4 PEN | Refills: 6 | Status: SHIPPED | OUTPATIENT
Start: 2017-11-13 | End: 2018-07-05 | Stop reason: SDUPTHER

## 2018-01-08 DIAGNOSIS — I10 ESSENTIAL HYPERTENSION, BENIGN: ICD-10-CM

## 2018-01-08 RX ORDER — AMLODIPINE BESYLATE 10 MG/1
TABLET ORAL
Qty: 30 TAB | Refills: 4 | Status: SHIPPED | OUTPATIENT
Start: 2018-01-08 | End: 2018-06-10 | Stop reason: SDUPTHER

## 2018-02-12 RX ORDER — LISINOPRIL 10 MG/1
TABLET ORAL
Qty: 30 TAB | Refills: 6 | Status: SHIPPED | OUTPATIENT
Start: 2018-02-12 | End: 2018-09-10 | Stop reason: SDUPTHER

## 2018-02-27 RX ORDER — ATORVASTATIN CALCIUM 40 MG/1
TABLET, FILM COATED ORAL
Qty: 30 TAB | Refills: 6 | Status: SHIPPED | OUTPATIENT
Start: 2018-02-27 | End: 2018-06-08 | Stop reason: SDUPTHER

## 2018-02-27 RX ORDER — METFORMIN HYDROCHLORIDE EXTENDED-RELEASE TABLETS 1000 MG/1
TABLET, FILM COATED, EXTENDED RELEASE ORAL
Qty: 60 TAB | Refills: 4 | Status: SHIPPED | OUTPATIENT
Start: 2018-02-27 | End: 2018-03-09 | Stop reason: SDUPTHER

## 2018-03-09 RX ORDER — METFORMIN HYDROCHLORIDE 500 MG/1
1000 TABLET, EXTENDED RELEASE ORAL 2 TIMES DAILY WITH MEALS
Qty: 120 TAB | Refills: 6 | Status: SHIPPED | OUTPATIENT
Start: 2018-03-09 | End: 2018-11-06 | Stop reason: SDUPTHER

## 2018-03-19 ENCOUNTER — HOSPITAL ENCOUNTER (OUTPATIENT)
Dept: LAB | Age: 68
Discharge: HOME | End: 2018-03-19
Payer: MEDICARE

## 2018-03-19 PROCEDURE — 80061 LIPID PANEL: CPT

## 2018-03-19 PROCEDURE — 83036 HEMOGLOBIN GLYCOSYLATED A1C: CPT

## 2018-03-19 PROCEDURE — 82043 UR ALBUMIN QUANTITATIVE: CPT

## 2018-03-19 PROCEDURE — 80053 COMPREHEN METABOLIC PANEL: CPT

## 2018-03-19 PROCEDURE — 36415 COLL VENOUS BLD VENIPUNCTURE: CPT

## 2018-03-19 PROCEDURE — 84681 ASSAY OF C-PEPTIDE: CPT

## 2018-03-19 PROCEDURE — 83516 IMMUNOASSAY NONANTIBODY: CPT

## 2018-03-26 ENCOUNTER — OFFICE VISIT (OUTPATIENT)
Dept: ENDOCRINOLOGY | Age: 68
End: 2018-03-26

## 2018-03-26 VITALS
SYSTOLIC BLOOD PRESSURE: 180 MMHG | WEIGHT: 225.8 LBS | DIASTOLIC BLOOD PRESSURE: 98 MMHG | OXYGEN SATURATION: 100 % | HEART RATE: 74 BPM | TEMPERATURE: 97 F | HEIGHT: 62 IN | BODY MASS INDEX: 41.55 KG/M2 | RESPIRATION RATE: 20 BRPM

## 2018-03-26 DIAGNOSIS — E78.2 MIXED HYPERLIPIDEMIA: ICD-10-CM

## 2018-03-26 DIAGNOSIS — E11.21 TYPE 2 DIABETES WITH NEPHROPATHY (HCC): Primary | ICD-10-CM

## 2018-03-26 DIAGNOSIS — I10 ESSENTIAL HYPERTENSION WITH GOAL BLOOD PRESSURE LESS THAN 130/80: ICD-10-CM

## 2018-03-26 DIAGNOSIS — E66.01 OBESITY, MORBID (HCC): ICD-10-CM

## 2018-03-26 NOTE — PROGRESS NOTES
Wt Readings from Last 3 Encounters:   03/26/18 225 lb 12.8 oz (102.4 kg)   09/28/17 225 lb (102.1 kg)   05/24/17 227 lb (103 kg)     Temp Readings from Last 3 Encounters:   03/26/18 97 °F (36.1 °C) (Oral)   09/28/17 97.4 °F (36.3 °C) (Oral)   05/24/17 97.6 °F (36.4 °C) (Oral)     BP Readings from Last 3 Encounters:   03/26/18 (!) 180/98   09/28/17 132/77   05/24/17 162/86     Pulse Readings from Last 3 Encounters:   03/26/18 74   09/28/17 82   05/24/17 74     Lab Results   Component Value Date/Time    Hemoglobin A1c 7.3 (H) 03/19/2018 09:17 AM    Hemoglobin A1c (POC) 7.8 04/21/2014 01:34 PM     B/P 180/98 Patient states she just took B/P med

## 2018-03-26 NOTE — PROGRESS NOTES
HISTORY OF PRESENT ILLNESS  Luann Chance is a 79 y.o. female. Patient here for f/u after last  visit of Type 2 diabetes mellitus  From  2017       Stable weight   She is recognizing that she is addicted for sugars     She is taking 2 mg bydureon and   glipizide 10 mg bid     She is taking only  metformin   She got the log , less consistent     She went to vacation      Old history :   Referred : by self/pcp  Pt of Dr. Akiko Santoro   He transferred care as Dr. Akiko Santoro left     H/o diabetes for 5  years     Current A1C is 6.9 % from 2013  and symptoms/problems include none     Current diabetic medications include glyburide 5 mg 2 pills a day  and victoza 1.8    Current monitoring regimen: home blood tests - 1 times daily  Home blood sugar records: trend: fluctuating a bit  Any episodes of hypoglycemia? no    Weight trend: increasing steadily  Prior visit with dietician: no  Current diet: \"unhealthy\" diet in general  Current exercise: no regular exercise    Known diabetic complications: none  Cardiovascular risk factors: dyslipidemia, diabetes mellitus, obesity, sedentary life style, hypertension, stress, post-menopausal    Eye exam current (within one year): yes  MI: no     Past Medical History:   Diagnosis Date    Diabetes mellitus ([facility] [address]      Past Surgical History:   Procedure Laterality Date    HX  SECTION      x2     Current Outpatient Prescriptions   Medication Sig    metFORMIN ER (GLUCOPHAGE XR) 500 mg tablet Take 2 Tabs by mouth two (2) times daily (with meals). Stop glumetza    atorvastatin (LIPITOR) 40 mg tablet TAKE 1 TAB BY MOUTH NIGHTLY.  STOP 20 MG DOSE    lisinopril (PRINIVIL, ZESTRIL) 10 mg tablet TAKE 1 TABLET BY MOUTH EVERY DAY    amLODIPine (NORVASC) 10 mg tablet TAKE 1 TABLET EVERY DAY    BYDUREON 2 mg/0.65 mL pnij INJECT 2 MG (1 SYRINGE ) SUBCUTANEOUSLY EVERY 7 DAYS    CONTOUR NEXT STRIPS strip TEST TWICE A DAY    glipiZIDE (GLUCOTROL) 5 mg tablet TAKE 2 TABLETS 20 MINUTES BEFORE BREAKFAST AND 2 TABLETS 20 MINS BEFORE DINNER    clotrimazole (LOTRIMIN) 1 % topical cream APPLY TO THE AFFECTED & SURROUNDING AREAS OF SKIN TWICE DAILY FOR 14 DAYS. THEN USE AS NEEDED    fluocinoNIDE (LIDEX) 0.05 % topical cream     hydrocortisone (HYTONE) 2.5 % topical cream     fluconazole (DIFLUCAN) 150 mg tablet Take 1 tablet daily for 3 days     No current facility-administered medications for this visit. Review of Systems   Constitutional: Negative. HENT: Negative. Eyes: Negative for pain and redness. Respiratory: Negative. Cardiovascular: Negative for chest pain, palpitations and leg swelling. Gastrointestinal: Negative. Negative for constipation. Genitourinary: Negative. Musculoskeletal: Negative for myalgias. Skin: Negative. Neurological: Negative. Endo/Heme/Allergies: Negative. Psychiatric/Behavioral: Negative for depression and memory loss. The patient does not have insomnia. Physical Exam   Constitutional: She is oriented to person, place, and time. She appears well-developed and well-nourished. HENT:   Head: Normocephalic. Eyes: Conjunctivae and EOM are normal. Pupils are equal, round, and reactive to light. Neck: Normal range of motion. Neck supple. No JVD present. No tracheal deviation present. No thyromegaly present. Cardiovascular: Normal rate, regular rhythm and normal heart sounds. No murmur heard. Pulmonary/Chest: Breath sounds normal.   Abdominal: Soft. Bowel sounds are normal.   Musculoskeletal: Normal range of motion. Lymphadenopathy:     She has no cervical adenopathy. Neurological: She is alert and oriented to person, place, and time. She has normal reflexes. Skin: Skin is warm. Psychiatric: She has a normal mood and affect.        Diabetic feet exam : march 2018     H/o partial or complete amputation of foot : n  H/o previous foot ulceration: n  H/o pre - ulcerative callus : yes   H/o peripheral neuropathy and callus : n  H/o poor circulation     MI   : n  Foot deformity : over riding second toe left foot , bunions       Diabetic foot exam: march 2018    Left: Reflexes nd     Vibratory sensation normal    Proprioception nd   Sharp/dull discrimination nd    Filament test normal sensation with micro filament   Pulse DP: 2 +   Pulse PT: nd   Deformities: Yes - over riding second toe, bunion  Right: Reflexes nd   Vibratory sensation normal   Proprioception nd   Sharp/dull discrimination nd   Filament test normal sensation with micro filament   Pulse DP: 2+ (normal)   Pulse PT: nd   Deformities: Yes - none            Lab Results   Component Value Date/Time    Hemoglobin A1c 7.3 (H) 03/19/2018 09:17 AM    Hemoglobin A1c 7.7 (H) 09/21/2017 09:29 AM    Hemoglobin A1c 7.5 (H) 05/22/2017 01:34 PM    Glucose 183 (H) 03/19/2018 09:17 AM    Glucose  05/05/2015 09:18 AM    Microalb/Creat ratio (ug/mg creat.) 57.8 (H) 03/19/2018 09:17 AM    LDL, calculated 101 (H) 03/19/2018 09:17 AM    Creatinine 1.00 03/19/2018 09:17 AM      Lab Results   Component Value Date/Time    Cholesterol, total 172 03/19/2018 09:17 AM    HDL Cholesterol 52 03/19/2018 09:17 AM    LDL, calculated 101 (H) 03/19/2018 09:17 AM    Triglyceride 95 03/19/2018 09:17 AM     Lab Results   Component Value Date/Time    ALT (SGPT) 23 03/19/2018 09:17 AM    AST (SGOT) 17 03/19/2018 09:17 AM    Alk. phosphatase 150 (H) 03/19/2018 09:17 AM    Bilirubin, total 0.3 03/19/2018 09:17 AM     Lab Results   Component Value Date/Time    GFR est AA 67 03/19/2018 09:17 AM    GFR est non-AA 58 (L) 03/19/2018 09:17 AM    Creatinine 1.00 03/19/2018 09:17 AM    BUN 14 03/19/2018 09:17 AM    Sodium 143 03/19/2018 09:17 AM    Potassium 4.5 03/19/2018 09:17 AM    Chloride 104 03/19/2018 09:17 AM    CO2 24 03/19/2018 09:17 AM      Lab Results   Component Value Date/Time    TSH 1.350 04/28/2015 10:03 AM    T4, Free 1.18 04/28/2015 10:03 AM            ASSESSMENT and PLAN      1. Type 2 DM uncontrolled : A1c is   7.3 %      From    [facility] 2018     Compared to  7.7 %     From sept 2017    compared to    7.5  %     From  May 2017     compared to    7.4 %     From    Feb 2017   Compared to   6.9 %    From nov 2016  Compared to  7.4 %    From aug 2016  Compared to 8.3 %     From may 2016  compared t o  7.1 %     From feb 2016  Compared to    8.2 %     From nov 2015 compared to    7.1  %      From aug 2015 compared to  7 % again from April 2015 compared to   From     Jan 2015 compared to    7.2 %    From oct 2014 compared to   6.6 %  From July 2014 compared to   7.8 %  From April 2014 compared to  6.9 % from nov 2013    She used to be  on glyburide 10 mg a day and victoza -- from Dr. Gayatri Figueroa   better  Control , dietary  compliance - she is trying   Stay on  bydureon  Changed from  [address] [employer]   because of medicare to metformin ER 1 gm bid with meals     on glipizide at 10 mg bid again may 2017  She No longer compliant with diet- eating desserts     Stopped jardiance for increased frequency of urination  Not even low dose invokana     She started  on  januvia 100 mg a day  Aug 2016 , and had to be stopped for RASH  She is currently taking only metformin er only once  A day     Explained the medication actions    Diet discussed     Patient is advised to check blood sugars 1-2 times daily by rotation method. reviewed medications and side effects in detail  I suggested following options to improve fasting sugars     Explained its benefits and risks  lab results and schedule of future lab studies reviewed with patient    2. Hypoglycemia :  Educated on treating the hypoglycemia. 3. HTN :  Not well controlled , missed meds last night , continue on norvasc 10 mg and lisinopril 10 mg   Proteinuria present   Procardia to be d/c d. ..for xtreme dizziness   Patient is educated about importance of compliance with anti-hypertensives especially ARB/ACEI    4.   Dyslipidemia : LDL better with compliance   lipitor or crestor or livalu to be considered , but now she is on medicare       5. use of aspirin to prevent MI and TIA's discussed      6. Menopause vasomotor symptoms  : she has none of them this time   Likely she was hypoglycemic from taking glipizide incorrectly    She passed stress test       7. Obesity : Body mass index is 41.3 kg/(m^2).   She enjoys food,  And she is on bydureon      > 50 % of time is spent on counseling   Patient voiced understanding her plan of care

## 2018-03-26 NOTE — MR AVS SNAPSHOT
[address] [facility] [address] 
[phone] Patient: Vicki Staton MRN: [id number] HES:8[birth year] Visit Information Date & Time Provider Department Dept. Phone Encounter #  
 3/26/2018  9:15 AM Chick Lombard, MD [facility] [phone] [id number] Follow-up Instructions Return in about 3 months (around 6/26/2018). Upcoming Health Maintenance Date Due Hepatitis C Screening 1950 DTaP/Tdap/Td series (1 - Tdap) 7/16/1971 BREAST CANCER SCRN MAMMOGRAM 7/16/2000 FOBT Q 1 YEAR AGE 50-75 7/16/2000 ZOSTER VACCINE AGE 60> 5/16/2010 FOOT EXAM Q1 4/17/2015 EYE EXAM RETINAL OR DILATED Q1 6/16/2015 GLAUCOMA SCREENING Q2Y 7/16/2015 Bone Densitometry (Dexa) Screening 7/16/2015 Pneumococcal 65+ Low/Medium Risk (1 of 2 - PCV13) 7/16/2015 Influenza Age 5 to Adult 8/1/2017 MEDICARE YEARLY EXAM 3/14/2018 HEMOGLOBIN A1C Q6M 9/19/2018 MICROALBUMIN Q1 3/19/2019 LIPID PANEL Q1 3/19/2019 Allergies as of 3/26/2018  Review Complete On: 3/26/2018 By: Chick Lombard, MD  
  
 Severity Noted Reaction Type Reactions Januvia [Sitagliptin]  11/30/2016    Hives Stopped for bad rash Current Immunizations  Never Reviewed No immunizations on file. Not reviewed this visit You Were Diagnosed With   
  
 Codes Comments Type 2 diabetes with nephropathy (HCC)    -  Primary ICD-10-CM: E11.21 
ICD-9-CM: 250.40, 583.81 Obesity, morbid ([facility] [address]     ICD-10-CM: E66.01 
ICD-9-CM: 278.01 Essential hypertension with goal blood pressure less than 130/80     ICD-10-CM: I10 
ICD-9-CM: 401.9 Mixed hyperlipidemia     ICD-10-CM: E78.2 ICD-9-CM: 272.2 Vitals BP Pulse Temp Resp Height(growth percentile) Weight(growth percentile)  (!) 180/98 (BP 1 Location: Right arm, BP Patient Position: Sitting) 74 97 °F (36.1 °C) (Oral) 20 5' 2\" (1.575 m) 225 lb 12.8 oz (102.4 kg) SpO2 BMI OB Status Smoking Status 100% 41.3 kg/m2 Postmenopausal Never Smoker Vitals History BMI and BSA Data Body Mass Index Body Surface Area  
 41.3 kg/m 2 2.12 m 2 Preferred Pharmacy Pharmacy Name Phone [id number] [address] AT [facility] [address] [phone] Your Updated Medication List  
  
   
This list is accurate as of 3/26/18 10:00 AM.  Always use your most recent med list. amLODIPine 10 mg tablet Commonly known as:  Ella Peach TAKE 1 TABLET EVERY DAY  
  
 atorvastatin 40 mg tablet Commonly known as:  LIPITOR  
TAKE 1 TAB BY MOUTH NIGHTLY. STOP 20 MG DOSE BYDUREON 2 mg/0.65 mL Pnij Generic drug:  exenatide microspheres INJECT 2 MG (1 SYRINGE ) SUBCUTANEOUSLY EVERY 7 DAYS  
  
 clotrimazole 1 % topical cream  
Commonly known as:  LOTRIMIN  
APPLY TO THE AFFECTED & SURROUNDING AREAS OF SKIN TWICE DAILY FOR 14 DAYS. THEN USE AS NEEDED CONTOUR NEXT STRIPS strip Generic drug:  glucose blood VI test strips TEST TWICE A DAY  
  
 fluconazole 150 mg tablet Commonly known as:  DIFLUCAN Take 1 tablet daily for 3 days  
  
 fluocinoNIDE 0.05 % topical cream  
Commonly known as:  LIDEX  
  
 glipiZIDE 5 mg tablet Commonly known as:  GLUCOTROL  
TAKE 2 TABLETS 20 MINUTES BEFORE BREAKFAST AND 2 TABLETS 20 MINS BEFORE DINNER  
  
 hydrocortisone 2.5 % topical cream  
Commonly known as:  HYTONE  
  
 lisinopril 10 mg tablet Commonly known as:  PRINIVIL, ZESTRIL  
TAKE 1 TABLET BY MOUTH EVERY DAY  
  
 metFORMIN  mg tablet Commonly known as:  GLUCOPHAGE XR Take 2 Tabs by mouth two (2) times daily (with meals). Stop Roxi Bazzi We Performed the Following  DIABETES FOOT EXAM [7 Custom] Follow-up Instructions Return in about 3 months (around 6/26/2018). Patient Instructions   
 
 lipitor at   40 mg at night Metformin Er 1 gm twice a day with meals  
 
glipizide 10 mg twenty minutes before b-fast and 10 mg twenty minutes before dinner Stay on  bydureon 2 mg a day Do not skip meals Do not eat in between meals Reduce carbs- pasta, rice, potatoes, bread Do not drink juices or sodas Donot eat peanut butter and biscuits Do not eat sugar free cookies and cakes Do not eat peaches, oranges, grapes and pine apples Introducing [facility]! [employer] introduces Leotus patient portal. Now you can access parts of your medical record, email your doctor's office, and request medication refills online. 1. In your internet browser, go to [email or web address] 2. Click on the First Time User? Click Here link in the Sign In box. You will see the New Member Sign Up page. 3. Enter your Leotus Access Code exactly as it appears below. You will not need to use this code after youve completed the sign-up process. If you do not sign up before the expiration date, you must request a new code. · Leotus Access Code: [email or web address] Expires: 6/24/2018  9:55 AM 
 
4. Enter the last four digits of your Social Security Number (xxxx) and Date of Birth (mm/dd/yyyy) as indicated and click Submit. You will be taken to the next sign-up page. 5. Create a Leotus ID. This will be your Leotus login ID and cannot be changed, so think of one that is secure and easy to remember. 6. Create a Leotus password. You can change your password at any time. 7. Enter your Password Reset Question and Answer. This can be used at a later time if you forget your password. 8. Enter your e-mail address. You will receive e-mail notification when new information is available in [address] 9. Click Sign Up. You can now view and download portions of your medical record.  
10. Click the Download Summary menu link to download a portable copy of your medical information. If you have questions, please visit the Frequently Asked Questions section of the Genbook website. Remember, Genbook is NOT to be used for urgent needs. For medical emergencies, dial 911. Now available from your iPhone and Android! Please provide this summary of care documentation to your next provider. Your primary care clinician is listed as [address]. If you have any questions after today's visit, please call [phone].

## 2018-04-03 ENCOUNTER — OP HISTORICAL/CONVERTED ENCOUNTER (OUTPATIENT)
Dept: OTHER | Age: 68
End: 2018-04-03

## 2018-04-26 ENCOUNTER — TELEPHONE (OUTPATIENT)
Dept: ENDOCRINOLOGY | Age: 68
End: 2018-04-26

## 2018-06-08 DIAGNOSIS — E78.2 MIXED HYPERLIPIDEMIA: Primary | ICD-10-CM

## 2018-06-08 RX ORDER — ATORVASTATIN CALCIUM 40 MG/1
TABLET, FILM COATED ORAL
Qty: 90 TAB | Refills: 4 | Status: SHIPPED | OUTPATIENT
Start: 2018-06-08 | End: 2019-06-05 | Stop reason: SDUPTHER

## 2018-06-10 DIAGNOSIS — I10 ESSENTIAL HYPERTENSION, BENIGN: ICD-10-CM

## 2018-06-10 RX ORDER — GLIPIZIDE 5 MG/1
TABLET ORAL
Qty: 120 TAB | Refills: 6 | Status: SHIPPED | OUTPATIENT
Start: 2018-06-10 | End: 2018-11-05 | Stop reason: ALTCHOICE

## 2018-06-10 RX ORDER — AMLODIPINE BESYLATE 10 MG/1
TABLET ORAL
Qty: 30 TAB | Refills: 4 | Status: SHIPPED | OUTPATIENT
Start: 2018-06-10 | End: 2018-11-08 | Stop reason: SDUPTHER

## 2018-06-25 ENCOUNTER — HOSPITAL ENCOUNTER (OUTPATIENT)
Dept: LAB | Age: 68
Discharge: HOME | End: 2018-06-25
Payer: MEDICARE

## 2018-06-25 PROCEDURE — 80061 LIPID PANEL: CPT

## 2018-06-25 PROCEDURE — 80053 COMPREHEN METABOLIC PANEL: CPT

## 2018-06-25 PROCEDURE — 36415 COLL VENOUS BLD VENIPUNCTURE: CPT

## 2018-06-25 PROCEDURE — 82043 UR ALBUMIN QUANTITATIVE: CPT

## 2018-06-25 PROCEDURE — 83036 HEMOGLOBIN GLYCOSYLATED A1C: CPT

## 2018-07-02 ENCOUNTER — OFFICE VISIT (OUTPATIENT)
Dept: ENDOCRINOLOGY | Age: 68
End: 2018-07-02

## 2018-07-02 VITALS
TEMPERATURE: 97.6 F | HEIGHT: 62 IN | BODY MASS INDEX: 40.83 KG/M2 | OXYGEN SATURATION: 98 % | HEART RATE: 95 BPM | WEIGHT: 221.9 LBS | DIASTOLIC BLOOD PRESSURE: 76 MMHG | SYSTOLIC BLOOD PRESSURE: 148 MMHG | RESPIRATION RATE: 18 BRPM

## 2018-07-02 DIAGNOSIS — I10 ESSENTIAL HYPERTENSION: ICD-10-CM

## 2018-07-02 DIAGNOSIS — I10 ESSENTIAL HYPERTENSION WITH GOAL BLOOD PRESSURE LESS THAN 130/80: ICD-10-CM

## 2018-07-02 DIAGNOSIS — E78.2 MIXED HYPERLIPIDEMIA: ICD-10-CM

## 2018-07-02 DIAGNOSIS — E11.21 TYPE 2 DIABETES WITH NEPHROPATHY (HCC): Primary | ICD-10-CM

## 2018-07-02 DIAGNOSIS — E66.01 OBESITY, MORBID (HCC): ICD-10-CM

## 2018-07-02 NOTE — PROGRESS NOTES
HISTORY OF PRESENT ILLNESS  Xavier Malave is a 79 y.o. female. Patient here for f/u after last  visit of Type 2 diabetes mellitus  From  2018     Lost 4 lbs     She is compliant with diet better   She has no complaints       Fasting sugars are high on few occasions          Old history :    She is recognizing that she is addicted for sugars     She is taking 2 mg bydureon and   glipizide 10 mg bid     She is taking only  metformin   She got the log , less consistent     She went to vacation      Old history :   Referred : by self/pcp  Pt of Dr. Karlie Parham   He transferred care as Dr. Karlie Parham left     H/o diabetes for 5  years     Current A1C is 6.9 % from 2013  and symptoms/problems include none     Current diabetic medications include glyburide 5 mg 2 pills a day  and victoza 1.8    Current monitoring regimen: home blood tests - 1 times daily  Home blood sugar records: trend: fluctuating a bit  Any episodes of hypoglycemia? no    Weight trend: increasing steadily  Prior visit with dietician: no  Current diet: \"unhealthy\" diet in general  Current exercise: no regular exercise    Known diabetic complications: none  Cardiovascular risk factors: dyslipidemia, diabetes mellitus, obesity, sedentary life style, hypertension, stress, post-menopausal    Eye exam current (within one year): yes  MI: no     Past Medical History:   Diagnosis Date    Diabetes mellitus ([address]      Past Surgical History:   Procedure Laterality Date    HX  SECTION      x2     Current Outpatient Prescriptions   Medication Sig    amLODIPine (NORVASC) 10 mg tablet TAKE 1 TABLET BY MOUTH EVERY DAY    glipiZIDE (GLUCOTROL) 5 mg tablet TAKE 2 TABLETS 20 MINUTES BEFORE BREAKFAST AND 2 TABLETS 20 MINS BEFORE DINNER    atorvastatin (LIPITOR) 40 mg tablet TAKE 1 TAB BY MOUTH NIGHTLY.  STOP 20 MG DOSE    glucose blood VI test strips (CONTOUR NEXT TEST STRIPS) strip TEST TWICE A DAY Dx code: E11.65    metFORMIN ER (GLUCOPHAGE XR) 500 mg tablet Take 2 Tabs by mouth two (2) times daily (with meals). Stop glumetza    lisinopril (PRINIVIL, ZESTRIL) 10 mg tablet TAKE 1 TABLET BY MOUTH EVERY DAY    BYDUREON 2 mg/0.65 mL pnij INJECT 2 MG (1 SYRINGE ) SUBCUTANEOUSLY EVERY 7 DAYS    clotrimazole (LOTRIMIN) 1 % topical cream APPLY TO THE AFFECTED & SURROUNDING AREAS OF SKIN TWICE DAILY FOR 14 DAYS. THEN USE AS NEEDED    fluocinoNIDE (LIDEX) 0.05 % topical cream     hydrocortisone (HYTONE) 2.5 % topical cream     fluconazole (DIFLUCAN) 150 mg tablet Take 1 tablet daily for 3 days     No current facility-administered medications for this visit. Review of Systems   Constitutional: Negative. HENT: Negative. Eyes: Negative for pain and redness. Respiratory: Negative. Cardiovascular: Negative for chest pain, palpitations and leg swelling. Gastrointestinal: Negative. Negative for constipation. Genitourinary: Negative. Musculoskeletal: Negative for myalgias. Skin: Negative. Neurological: Negative. Endo/Heme/Allergies: Negative. Psychiatric/Behavioral: Negative for depression and memory loss. The patient does not have insomnia. Physical Exam   Constitutional: She is oriented to person, place, and time. She appears well-developed and well-nourished. HENT:   Head: Normocephalic. Eyes: Conjunctivae and EOM are normal. Pupils are equal, round, and reactive to light. Neck: Normal range of motion. Neck supple. No JVD present. No tracheal deviation present. No thyromegaly present. Cardiovascular: Normal rate, regular rhythm and normal heart sounds. No murmur heard. Pulmonary/Chest: Breath sounds normal.   Abdominal: Soft. Bowel sounds are normal.   Musculoskeletal: Normal range of motion. Lymphadenopathy:     She has no cervical adenopathy. Neurological: She is alert and oriented to person, place, and time. She has normal reflexes. Skin: Skin is warm. Psychiatric: She has a normal mood and affect. Diabetic feet exam : march 2018     H/o partial or complete amputation of foot : n  H/o previous foot ulceration: n  H/o pre - ulcerative callus : yes   H/o peripheral neuropathy and callus : n  H/o poor circulation     MI   : n  Foot deformity : over riding second toe left foot , bunions       Diabetic foot exam: march 2018    Left: Reflexes nd     Vibratory sensation normal    Proprioception nd   Sharp/dull discrimination nd    Filament test normal sensation with micro filament   Pulse DP: 2 +   Pulse PT: nd   Deformities: Yes - over riding second toe, bunion  Right: Reflexes nd   Vibratory sensation normal   Proprioception nd   Sharp/dull discrimination nd   Filament test normal sensation with micro filament   Pulse DP: 2+ (normal)   Pulse PT: nd   Deformities: Yes - none            Lab Results   Component Value Date/Time    Hemoglobin A1c 6.9 (H) 06/25/2018 08:38 AM    Hemoglobin A1c 7.3 (H) 03/19/2018 09:17 AM    Hemoglobin A1c 7.7 (H) 09/21/2017 09:29 AM    Glucose 152 (H) 06/25/2018 08:38 AM    Glucose  05/05/2015 09:18 AM    Microalb/Creat ratio (ug/mg creat.) 38.2 (H) 06/25/2018 08:38 AM    LDL, calculated 90 06/25/2018 08:38 AM    Creatinine 1.14 (H) 06/25/2018 08:38 AM      Lab Results   Component Value Date/Time    Cholesterol, total 154 06/25/2018 08:38 AM    HDL Cholesterol 48 06/25/2018 08:38 AM    LDL, calculated 90 06/25/2018 08:38 AM    Triglyceride 81 06/25/2018 08:38 AM     Lab Results   Component Value Date/Time    ALT (SGPT) 23 06/25/2018 08:38 AM    AST (SGOT) 14 06/25/2018 08:38 AM    Alk.  phosphatase 161 (H) 06/25/2018 08:38 AM    Bilirubin, total 0.3 06/25/2018 08:38 AM     Lab Results   Component Value Date/Time    GFR est AA 57 (L) 06/25/2018 08:38 AM    GFR est non-AA 50 (L) 06/25/2018 08:38 AM    Creatinine 1.14 (H) 06/25/2018 08:38 AM    BUN 19 06/25/2018 08:38 AM    Sodium 144 06/25/2018 08:38 AM    Potassium 4.6 06/25/2018 08:38 AM    Chloride 108 (H) 06/25/2018 08:38 AM    CO2 22 06/25/2018 08:38 AM      Lab Results   Component Value Date/Time    TSH 1.350 04/28/2015 10:03 AM    T4, Free 1.18 04/28/2015 10:03 AM            ASSESSMENT and PLAN      1. Type 2 DM uncontrolled : A1c is   6.9 %     From   June 2018    compared to   7.3 %      From    [facility] 2018     Compared to  7.7 %     From sept 2017    compared to    7.5  %     From  May 2017     compared to    7.4 %     From    Feb 2017   Compared to   6.9 %    From nov 2016  Compared to  7.4 %    From aug 2016  Compared to 8.3 %     From may 2016  compared t o  7.1 %     From feb 2016  Compared to    8.2 %     From nov 2015 compared to    7.1  %      From aug 2015 compared to  7 % again from April 2015 compared to   From     Jan 2015 compared to    7.2 %    From oct 2014 compared to   6.6 %  From July 2014 compared to   7.8 %  From April 2014 compared to  6.9 % from nov 2013    She used to be  on glyburide 10 mg a day and victoza -- from Dr. Fatmata Craig   better  Control , dietary  compliance - she is trying and finally got A1c below 7 %    Stay on  bydureon  Changed from  [address]   because of medicare to metformin ER 1 gm bid with meals     on glipizide at 10 mg bid again may 2017  She is better compliant with diet        Stopped jardiance for increased frequency of urination  Not even low dose invokana   She started  on  januvia 100 mg a day  Aug 2016 , and had to be stopped for RASH  She is currently taking only metformin er only once  A day     Explained the medication actions    Diet discussed     Patient is advised to check blood sugars 1-2 times daily by rotation method. reviewed medications and side effects in detail  I suggested following options to improve fasting sugars     Explained its benefits and risks  lab results and schedule of future lab studies reviewed with patient    2. Hypoglycemia :  Educated on treating the hypoglycemia. 3.  HTN :  Not well controlled , missed meds last night , continue on norvasc 10 mg and lisinopril 10 mg   Proteinuria present   Procardia to be d/c d. ..for xtreme dizziness   Patient is educated about importance of compliance with anti-hypertensives especially ARB/ACEI    4. Dyslipidemia : LDL better with compliance   lipitor 40 mg hs       5. use of aspirin to prevent MI and TIA's discussed      6. Menopause vasomotor symptoms  : she has none of them this time   Likely she was hypoglycemic from taking glipizide incorrectly    She passed the stress test       7. Obesity : Body mass index is 40.59 kg/(m^2).   She enjoys food,  And she is on bydureon      > 50 % of time is spent on counseling   Patient voiced understanding her plan of care

## 2018-07-02 NOTE — MR AVS SNAPSHOT
[address] [facility] [address] 
[phone] Patient: Marylene Seals MRN: [id number] KZH:[birth year] Visit Information Date & Time Provider Department Dept. Phone Encounter #  
 7/2/2018 11:30 AM Jaime Rivera MD [facility] [phone] [id number] Follow-up Instructions Return in about 4 months (around 11/2/2018). Upcoming Health Maintenance Date Due Hepatitis C Screening 1950 DTaP/Tdap/Td series (1 - Tdap) 7/16/1971 BREAST CANCER SCRN MAMMOGRAM 7/16/2000 FOBT Q 1 YEAR AGE 50-75 7/16/2000 ZOSTER VACCINE AGE 60> 5/16/2010 Bone Densitometry (Dexa) Screening 7/16/2015 Pneumococcal 65+ Low/Medium Risk (1 of 2 - PCV13) 7/16/2015 MEDICARE YEARLY EXAM 3/14/2018 Influenza Age 5 to Adult 8/1/2018 HEMOGLOBIN A1C Q6M 12/25/2018 FOOT EXAM Q1 3/26/2019 EYE EXAM RETINAL OR DILATED Q1 4/25/2019 MICROALBUMIN Q1 6/25/2019 LIPID PANEL Q1 6/25/2019 GLAUCOMA SCREENING Q2Y 4/25/2020 Allergies as of 7/2/2018  Review Complete On: 7/2/2018 By: Jaime Rivera MD  
  
 Severity Noted Reaction Type Reactions Januvia [Sitagliptin]  11/30/2016    Hives Stopped for bad rash Current Immunizations  Never Reviewed No immunizations on file. Not reviewed this visit Vitals BP Pulse Temp Resp Height(growth percentile) Weight(growth percentile) 148/76 95 97.6 °F (36.4 °C) (Oral) 18 5' 2\" (1.575 m) 221 lb 14.4 oz (100.7 kg) SpO2 BMI OB Status Smoking Status 98% 40.59 kg/m2 Postmenopausal Never Smoker Vitals History BMI and BSA Data Body Mass Index Body Surface Area 40.59 kg/m 2 2.1 m 2 Preferred Pharmacy Pharmacy Name Phone [id number] [address] AT [place] [address] [phone] Your Updated Medication List  
  
   
 This list is accurate as of 7/2/18 11:38 AM.  Always use your most recent med list. amLODIPine 10 mg tablet Commonly known as:  [place] TAKE 1 TABLET BY MOUTH EVERY DAY  
  
 atorvastatin 40 mg tablet Commonly known as:  LIPITOR  
TAKE 1 TAB BY MOUTH NIGHTLY. STOP 20 MG DOSE BYDUREON 2 mg/0.65 mL Pnij Generic drug:  exenatide microspheres INJECT 2 MG (1 SYRINGE ) SUBCUTANEOUSLY EVERY 7 DAYS  
  
 fluconazole 150 mg tablet Commonly known as:  DIFLUCAN Take 1 tablet daily for 3 days  
  
 glipiZIDE 5 mg tablet Commonly known as:  GLUCOTROL  
TAKE 2 TABLETS 20 MINUTES BEFORE BREAKFAST AND 2 TABLETS 20 MINS BEFORE DINNER  
  
 glucose blood VI test strips strip Commonly known as:  CONTOUR NEXT TEST STRIPS  
TEST TWICE A DAY Dx code: E11.65  
  
 lisinopril 10 mg tablet Commonly known as:  PRINIVIL, ZESTRIL  
TAKE 1 TABLET BY MOUTH EVERY DAY  
  
 metFORMIN  mg tablet Commonly known as:  GLUCOPHAGE XR Take 2 Tabs by mouth two (2) times daily (with meals). Stop Zari Alfaro Follow-up Instructions Return in about 4 months (around 11/2/2018). Patient Instructions   
 
 lipitor at   40 mg at night Metformin Er 1 gm twice a day with meals  
 
glipizide 10 mg twenty minutes before b-fast and 10 mg twenty minutes before dinner Stay on  bydureon 2 mg a day Do not skip meals Do not eat in between meals Reduce carbs- pasta, rice, potatoes, bread Do not drink juices or sodas Donot eat peanut butter and biscuits Do not eat sugar free cookies and cakes Do not eat peaches, oranges, grapes and pine apples Introducing [facility]! Rom[employer] introduces [employer] patient portal. Now you can access parts of your medical record, email your doctor's office, and request medication refills online. 1. In your internet browser, go to [email or web address] 2. Click on the First Time User? Click Here link in the Sign In box. You will see the New Member Sign Up page. 3. Enter your [employer] Access Code exactly as it appears below. You will not need to use this code after youve completed the sign-up process. If you do not sign up before the expiration date, you must request a new code. · [employer] Access Code: [email or web address] Expires: 9/30/2018 11:38 AM 
 
4. Enter the last four digits of your Social Security Number (xxxx) and Date of Birth (mm/dd/yyyy) as indicated and click Submit. You will be taken to the next sign-up page. 5. Create a [employer] ID. This will be your [employer] login ID and cannot be changed, so think of one that is secure and easy to remember. 6. Create a [employer] password. You can change your password at any time. 7. Enter your Password Reset Question and Answer. This can be used at a later time if you forget your password. 8. Enter your e-mail address. You will receive e-mail notification when new information is available in [address] 9. Click Sign Up. You can now view and download portions of your medical record. 10. Click the Download Summary menu link to download a portable copy of your medical information. If you have questions, please visit the Frequently Asked Questions section of the [employer] website. Remember, [employer] is NOT to be used for urgent needs. For medical emergencies, dial 911. Now available from your iPhone and Android! Please provide this summary of care documentation to your next provider. Your primary care clinician is listed as [address]. If you have any questions after today's visit, please call [phone].

## 2018-07-02 NOTE — PROGRESS NOTES
1. Have you been to the ER, urgent care clinic since your last visit? Hospitalized since your last visit? No    2. Have you seen or consulted any other health care providers outside of the [address] [place] [address] since your last visit? Include any pap smears or colon screening.  No   Chief Complaint   Patient presents with    Diabetes     followup     Wt Readings from Last 3 Encounters:   07/02/18 221 lb 14.4 oz (100.7 kg)   03/26/18 225 lb 12.8 oz (102.4 kg)   09/28/17 225 lb (102.1 kg)     Temp Readings from Last 3 Encounters:   07/02/18 97.6 °F (36.4 °C) (Oral)   03/26/18 97 °F (36.1 °C) (Oral)   09/28/17 97.4 °F (36.3 °C) (Oral)     BP Readings from Last 3 Encounters:   07/02/18 148/76   03/26/18 (!) 180/98   09/28/17 132/77     Pulse Readings from Last 3 Encounters:   07/02/18 95   03/26/18 74   09/28/17 82

## 2018-07-05 DIAGNOSIS — E11.9 DIABETES MELLITUS WITHOUT COMPLICATION (HCC): ICD-10-CM

## 2018-07-05 RX ORDER — EXENATIDE 2 MG/.65ML
INJECTION, SUSPENSION, EXTENDED RELEASE SUBCUTANEOUS
Qty: 4 PEN | Refills: 6 | Status: SHIPPED | OUTPATIENT
Start: 2018-07-05 | End: 2019-01-22 | Stop reason: SDUPTHER

## 2018-09-10 RX ORDER — LISINOPRIL 10 MG/1
TABLET ORAL
Qty: 30 TAB | Refills: 6 | Status: SHIPPED | OUTPATIENT
Start: 2018-09-10 | End: 2018-11-07 | Stop reason: SDUPTHER

## 2018-10-29 ENCOUNTER — HOSPITAL ENCOUNTER (OUTPATIENT)
Dept: LAB | Age: 68
Discharge: HOME | End: 2018-10-29
Payer: MEDICARE

## 2018-10-29 PROCEDURE — 36415 COLL VENOUS BLD VENIPUNCTURE: CPT

## 2018-10-29 PROCEDURE — 80053 COMPREHEN METABOLIC PANEL: CPT

## 2018-10-29 PROCEDURE — 80061 LIPID PANEL: CPT

## 2018-10-29 PROCEDURE — 82043 UR ALBUMIN QUANTITATIVE: CPT

## 2018-10-29 PROCEDURE — 83036 HEMOGLOBIN GLYCOSYLATED A1C: CPT

## 2018-11-05 ENCOUNTER — OFFICE VISIT (OUTPATIENT)
Dept: ENDOCRINOLOGY | Age: 68
End: 2018-11-05

## 2018-11-05 VITALS
OXYGEN SATURATION: 97 % | DIASTOLIC BLOOD PRESSURE: 82 MMHG | SYSTOLIC BLOOD PRESSURE: 151 MMHG | HEART RATE: 82 BPM | HEIGHT: 62 IN | RESPIRATION RATE: 18 BRPM | WEIGHT: 221.9 LBS | TEMPERATURE: 97.6 F | BODY MASS INDEX: 40.83 KG/M2

## 2018-11-05 DIAGNOSIS — E78.2 MIXED HYPERLIPIDEMIA: ICD-10-CM

## 2018-11-05 DIAGNOSIS — E11.21 TYPE 2 DIABETES WITH NEPHROPATHY (HCC): Primary | ICD-10-CM

## 2018-11-05 DIAGNOSIS — I10 ESSENTIAL HYPERTENSION: ICD-10-CM

## 2018-11-05 DIAGNOSIS — E11.21 TYPE 2 DIABETES WITH NEPHROPATHY (HCC): ICD-10-CM

## 2018-11-05 RX ORDER — GLIMEPIRIDE 4 MG/1
TABLET ORAL
Qty: 30 TAB | Refills: 6 | Status: SHIPPED | OUTPATIENT
Start: 2018-11-05 | End: 2018-11-05 | Stop reason: SDUPTHER

## 2018-11-05 RX ORDER — GLIMEPIRIDE 4 MG/1
TABLET ORAL
Qty: 30 TAB | Refills: 6 | Status: SHIPPED | OUTPATIENT
Start: 2018-11-05 | End: 2019-02-05 | Stop reason: SDUPTHER

## 2018-11-05 RX ORDER — REPAGLINIDE 2 MG/1
2 TABLET ORAL
Qty: 90 TAB | Refills: 6 | Status: SHIPPED | OUTPATIENT
Start: 2018-11-05 | End: 2019-06-05 | Stop reason: SDUPTHER

## 2018-11-05 NOTE — PROGRESS NOTES
1. Have you been to the ER, urgent care clinic since your last visit? No  Hospitalized since your last visit? No 
 
2. Have you seen or consulted any other health care providers outside of the [address] [place] [address] since your last visit? Include any pap smears or colon screening. No  
 
Wt Readings from Last 3 Encounters:  
11/05/18 221 lb 14.4 oz (100.7 kg) 07/02/18 221 lb 14.4 oz (100.7 kg) 03/26/18 225 lb 12.8 oz (102.4 kg) Temp Readings from Last 3 Encounters:  
11/05/18 97.6 °F (36.4 °C) (Oral) 07/02/18 97.6 °F (36.4 °C) (Oral) 03/26/18 97 °F (36.1 °C) (Oral) BP Readings from Last 3 Encounters:  
11/05/18 151/82  
07/02/18 148/76  
03/26/18 (!) 180/98 Pulse Readings from Last 3 Encounters:  
11/05/18 82  
07/02/18 95  
03/26/18 74 Lab Results Component Value Date/Time Hemoglobin A1c 7.4 (H) 10/29/2018 08:53 AM  
 Hemoglobin A1c (POC) 7.8 04/21/2014 01:34 PM  
 
Patient has meter today.

## 2018-11-05 NOTE — PROGRESS NOTES
HISTORY OF PRESENT ILLNESS Marisel Valenzuela is a 76 y.o. female. Patient here for f/u after last  visit of Type 2 diabetes mellitus  From  2018 Lost 4 lbs She is compliant with diet better She has FLU SHE GOT THE METER  
SHE IS C/O FASTING SUGARS Old history : 
 
She is recognizing that she is addicted for sugars She is taking 2 mg bydureon and  
glipizide 10 mg bid She is taking only  metformin She got the log , less consistent She went to vacation Old history :  
Referred : by self/pcp Pt of Dr. Brayden Shaw He transferred care as Dr. Brayden Shaw left H/o diabetes for 5  years Current A1C is 6.9 % from 2013  and symptoms/problems include none Current diabetic medications include glyburide 5 mg 2 pills a day  and victoza 1.8 Current monitoring regimen: home blood tests - 1 times daily Home blood sugar records: trend: fluctuating a bit Any episodes of hypoglycemia? no 
 
Weight trend: increasing steadily Prior visit with dietician: no 
Current diet: \"unhealthy\" diet in general 
Current exercise: no regular exercise Known diabetic complications: none Cardiovascular risk factors: dyslipidemia, diabetes mellitus, obesity, sedentary life style, hypertension, stress, post-menopausal 
 
Eye exam current (within one year): yes MI: no  
 
Past Medical History:  
Diagnosis Date  Diabetes mellitus ([facility] [address] Past Surgical History:  
Procedure Laterality Date  HX  SECTION    
 x2 Current Outpatient Medications Medication Sig  
 lisinopril (PRINIVIL, ZESTRIL) 10 mg tablet TAKE 1 TABLET BY MOUTH EVERY DAY  
 BYDUREON 2 mg/0.65 mL pnij INJECT ONE SYRINGE SUBCUTANEOUSLY ONCE EVERY 7 DAYS  amLODIPine (NORVASC) 10 mg tablet TAKE 1 TABLET BY MOUTH EVERY DAY  glipiZIDE (GLUCOTROL) 5 mg tablet TAKE 2 TABLETS 20 MINUTES BEFORE BREAKFAST AND 2 TABLETS 20 MINS BEFORE DINNER  
  atorvastatin (LIPITOR) 40 mg tablet TAKE 1 TAB BY MOUTH NIGHTLY. STOP 20 MG DOSE  
 glucose blood VI test strips (CONTOUR NEXT TEST STRIPS) strip TEST TWICE A DAY Dx code: E11.65  
 metFORMIN ER (GLUCOPHAGE XR) 500 mg tablet Take 2 Tabs by mouth two (2) times daily (with meals). Stop Filemon Flores  fluconazole (DIFLUCAN) 150 mg tablet Take 1 tablet daily for 3 days No current facility-administered medications for this visit. Review of Systems Constitutional: Negative. HENT: Negative. Eyes: Negative for pain and redness. Respiratory: Negative. Cardiovascular: Negative for chest pain, palpitations and leg swelling. Gastrointestinal: Negative. Negative for constipation. Genitourinary: Negative. Musculoskeletal: Negative for myalgias. Skin: Negative. Neurological: Negative. Endo/Heme/Allergies: Negative. Psychiatric/Behavioral: Negative for depression and memory loss. The patient does not have insomnia. Physical Exam  
Constitutional: She is oriented to person, place, and time. She appears well-developed and well-nourished. HENT:  
Head: Normocephalic. Eyes: Conjunctivae and EOM are normal. Pupils are equal, round, and reactive to light. Neck: Normal range of motion. Neck supple. No JVD present. No tracheal deviation present. No thyromegaly present. Cardiovascular: Normal rate, regular rhythm and normal heart sounds. No murmur heard. Pulmonary/Chest: Breath sounds normal.  
Abdominal: Soft. Bowel sounds are normal.  
Musculoskeletal: Normal range of motion. Lymphadenopathy:  
  She has no cervical adenopathy. Neurological: She is alert and oriented to person, place, and time. She has normal reflexes. Skin: Skin is warm. Psychiatric: She has a normal mood and affect. Diabetic feet exam : march 2018 H/o partial or complete amputation of foot : n 
H/o previous foot ulceration: n 
H/o pre - ulcerative callus : yes H/o peripheral neuropathy and callus : n 
H/o poor circulation     MI   : n 
Foot deformity : over riding second toe left foot , bunions Diabetic foot exam: march 2018 Left: Reflexes nd Vibratory sensation normal  
 Proprioception nd Sharp/dull discrimination nd Filament test normal sensation with micro filament Pulse DP: 2 + Pulse PT: nd 
 Deformities: Yes - over riding second toe, bunion Right: Reflexes nd Vibratory sensation normal 
 Proprioception nd Sharp/dull discrimination nd Filament test normal sensation with micro filament Pulse DP: 2+ (normal) Pulse PT: nd 
 Deformities: Yes - none Lab Results Component Value Date/Time Hemoglobin A1c 7.4 (H) 10/29/2018 08:53 AM  
 Hemoglobin A1c 6.9 (H) 06/25/2018 08:38 AM  
 Hemoglobin A1c 7.3 (H) 03/19/2018 09:17 AM  
 Glucose 195 (H) 10/29/2018 08:53 AM  
 Glucose  05/05/2015 09:18 AM  
 Microalb/Creat ratio (ug/mg creat.) 75.5 (H) 10/29/2018 08:53 AM  
 LDL, calculated 100 (H) 10/29/2018 08:53 AM  
 Creatinine 0.99 10/29/2018 08:53 AM  
  
Lab Results Component Value Date/Time Cholesterol, total 158 10/29/2018 08:53 AM  
 HDL Cholesterol 40 10/29/2018 08:53 AM  
 LDL, calculated 100 (H) 10/29/2018 08:53 AM  
 Triglyceride 91 10/29/2018 08:53 AM  
 
Lab Results Component Value Date/Time ALT (SGPT) 27 10/29/2018 08:53 AM  
 AST (SGOT) 19 10/29/2018 08:53 AM  
 Alk. phosphatase 169 (H) 10/29/2018 08:53 AM  
 Bilirubin, total 0.3 10/29/2018 08:53 AM  
 
Lab Results Component Value Date/Time GFR est AA 68 10/29/2018 08:53 AM  
 GFR est non-AA 59 (L) 10/29/2018 08:53 AM  
 Creatinine 0.99 10/29/2018 08:53 AM  
 BUN 16 10/29/2018 08:53 AM  
 Sodium 142 10/29/2018 08:53 AM  
 Potassium 4.6 10/29/2018 08:53 AM  
 Chloride 105 10/29/2018 08:53 AM  
 CO2 22 10/29/2018 08:53 AM  
  
 
 
 
 
ASSESSMENT and PLAN 1. Type 2 DM uncontrolled : A1c is    7.4 %    FROM OCT 2018    COMPARED TO 6.9 %     From   June 2018    compared to   7.3 %      From    [facility] 2018     Compared to  7.7 %     From sept 2017    compared to    7.5  %     From  May 2017     compared to    7.4 %     From    Feb 2017   Compared to   6.9 %    From nov 2016  Compared to  7.4 %    From aug 2016  Compared to 8.3 %     From may 2016  compared t o  7.1 %     From feb 2016  Compared to    8.2 %     From nov 2015 compared to    7.1  %      From aug 2015 compared to  7 % again from April 2015 compared to   From     Jan 2015 compared to    7.2 %    From oct 2014 compared to   6.6 %  From July 2014 compared to   7.8 %  From April 2014 compared to  6.9 % from nov 2013 She used to be  on glyburide 10 mg a day and victoza -- from Dr. Kevin Gomez Stay on  bydureon Changed from  [email or web address].sl   because of medicare to metformin ER 1 gm bid with meals , She is currently taking only metformin er only once  A day  
 
on glipizide at 10 mg bid again may 2017 She is NOT compliant with diet EXPLAINED THE MEDICATION ACTIONS AT LENGTH  
HER FASTING SUGARS ARE HIGH  
 
WILL DO AMARYL 4 MG HS AND USE PRANDIN 2 MG TID DOSING Stopped jardiance for increased frequency of urination Not even low dose invokana she could tolerate She started  on  januvia 100 mg a day  Aug 2016 , and had to be stopped for [facility] [place] Explained the medication actions Diet discussed Patient is advised to check blood sugars 1-2 times daily by rotation method. reviewed medications and side effects in detail I suggested following options to improve fasting sugars Explained its benefits and risks 
lab results and schedule of future lab studies reviewed with patient 2. Hypoglycemia :  Educated on treating the hypoglycemia. 3. HTN :  Not well controlled , missed meds last night , continue on norvasc 10 mg and lisinopril 10 mg  
Proteinuria present Procardia to be d/c d. ..for xtreme dizziness Patient is educated about importance of compliance with anti-hypertensives especially ARB/ACEI 4. Dyslipidemia : LDL better with compliance  
lipitor 40 mg hs  
 
 
5. use of aspirin to prevent MI and TIA's discussed 6. Menopause vasomotor symptoms  : she has none of them this time Likely she was hypoglycemic from taking glipizide incorrectly She passed the stress test  
 
 
7. Obesity : Body mass index is 40.59 kg/m². She enjoys food,  And she is on bydureon 
 
 
> 50 % of time is spent on counseling Patient voiced understanding her plan of care

## 2018-11-05 NOTE — PATIENT INSTRUCTIONS
lipitor at   40 mg at night Metformin Er 1 gm twice a day with meals Start on amaryl 4 mg at night time , stop glipizide  ( print it  - she buys it ) Start on  prandin 2 mg right before meals  3 times a day Stay on  bydureon 2 mg a day Do not skip meals Do not eat in between meals Reduce carbs- pasta, rice, potatoes, bread Do not drink juices or sodas Donot eat peanut butter and biscuits Do not eat sugar free cookies and cakes Do not eat peaches, oranges, grapes and pine apples  And fruit medleys

## 2018-11-06 RX ORDER — METFORMIN HYDROCHLORIDE 500 MG/1
1000 TABLET, EXTENDED RELEASE ORAL 2 TIMES DAILY WITH MEALS
Qty: 360 TAB | Refills: 4 | Status: SHIPPED | OUTPATIENT
Start: 2018-11-06 | End: 2018-11-07 | Stop reason: SDUPTHER

## 2018-11-07 RX ORDER — LISINOPRIL 10 MG/1
TABLET ORAL
Qty: 90 TAB | Refills: 4 | Status: SHIPPED | OUTPATIENT
Start: 2018-11-07 | End: 2019-11-09 | Stop reason: SDUPTHER

## 2018-11-07 RX ORDER — METFORMIN HYDROCHLORIDE 500 MG/1
1000 TABLET, EXTENDED RELEASE ORAL 2 TIMES DAILY WITH MEALS
Qty: 360 TAB | Refills: 4 | Status: SHIPPED | OUTPATIENT
Start: 2018-11-07 | End: 2020-01-28

## 2018-11-08 DIAGNOSIS — I10 ESSENTIAL HYPERTENSION, BENIGN: ICD-10-CM

## 2018-11-08 RX ORDER — AMLODIPINE BESYLATE 10 MG/1
TABLET ORAL
Qty: 30 TAB | Refills: 4 | Status: SHIPPED | OUTPATIENT
Start: 2018-11-08 | End: 2019-04-19 | Stop reason: SDUPTHER

## 2018-11-16 ENCOUNTER — OP HISTORICAL/CONVERTED ENCOUNTER (OUTPATIENT)
Dept: OTHER | Age: 68
End: 2018-11-16

## 2019-01-22 DIAGNOSIS — E11.9 DIABETES MELLITUS WITHOUT COMPLICATION (HCC): ICD-10-CM

## 2019-01-22 RX ORDER — EXENATIDE 2 MG/.65ML
INJECTION, SUSPENSION, EXTENDED RELEASE SUBCUTANEOUS
Qty: 4 PEN | Refills: 6 | Status: SHIPPED | OUTPATIENT
Start: 2019-01-22 | End: 2019-11-19 | Stop reason: SDUPTHER

## 2019-01-22 NOTE — TELEPHONE ENCOUNTER
----- Message from Candida Sam sent at 1/22/2019 12:47 PM EST -----  Regarding: Dr. Ian Ireland Refskyler  Pt is needing a refill on her medication, \"Bydureon\" to be sent to [facility] (on file).  (prescription number [id number]) Phone: [phone]

## 2019-02-01 ENCOUNTER — HOSPITAL ENCOUNTER (OUTPATIENT)
Dept: LAB | Age: 69
Discharge: HOME | End: 2019-02-01
Payer: MEDICARE

## 2019-02-01 PROCEDURE — 80061 LIPID PANEL: CPT

## 2019-02-01 PROCEDURE — 83036 HEMOGLOBIN GLYCOSYLATED A1C: CPT

## 2019-02-01 PROCEDURE — 36415 COLL VENOUS BLD VENIPUNCTURE: CPT

## 2019-02-01 PROCEDURE — 80053 COMPREHEN METABOLIC PANEL: CPT

## 2019-02-01 PROCEDURE — 82043 UR ALBUMIN QUANTITATIVE: CPT

## 2019-02-05 ENCOUNTER — OFFICE VISIT (OUTPATIENT)
Dept: ENDOCRINOLOGY | Age: 69
End: 2019-02-05

## 2019-02-05 VITALS
HEART RATE: 79 BPM | BODY MASS INDEX: 41.28 KG/M2 | TEMPERATURE: 96.9 F | RESPIRATION RATE: 18 BRPM | HEIGHT: 62 IN | WEIGHT: 224.3 LBS | SYSTOLIC BLOOD PRESSURE: 162 MMHG | OXYGEN SATURATION: 100 % | DIASTOLIC BLOOD PRESSURE: 87 MMHG

## 2019-02-05 DIAGNOSIS — E66.01 CLASS 3 SEVERE OBESITY DUE TO EXCESS CALORIES WITH SERIOUS COMORBIDITY AND BODY MASS INDEX (BMI) OF 40.0 TO 44.9 IN ADULT (HCC): ICD-10-CM

## 2019-02-05 DIAGNOSIS — E78.2 MIXED HYPERLIPIDEMIA: ICD-10-CM

## 2019-02-05 DIAGNOSIS — E11.65 TYPE 2 DIABETES MELLITUS WITH HYPERGLYCEMIA, WITHOUT LONG-TERM CURRENT USE OF INSULIN (HCC): Primary | ICD-10-CM

## 2019-02-05 DIAGNOSIS — I10 ESSENTIAL HYPERTENSION, BENIGN: ICD-10-CM

## 2019-02-05 DIAGNOSIS — E11.21 TYPE 2 DIABETES WITH NEPHROPATHY (HCC): ICD-10-CM

## 2019-02-05 RX ORDER — GLIMEPIRIDE 4 MG/1
TABLET ORAL
Qty: 30 TAB | Refills: 6 | Status: SHIPPED | OUTPATIENT
Start: 2019-02-05 | End: 2020-01-08 | Stop reason: SINTOL

## 2019-02-05 NOTE — PATIENT INSTRUCTIONS
lipitor at   40 mg at night Metformin Er 1 gm twice a day with meals  
 
 amaryl 4 mg at night time , stop glipizide  ( print it  - she buys it ) 
 prandin 2 mg right before meals  3 times a day Stay on  bydureon 2 mg a day Do not skip meals Do not eat in between meals Reduce carbs- pasta, rice, potatoes, bread Do not drink juices or sodas Donot eat peanut butter and biscuits Do not eat sugar free cookies and cakes Do not eat peaches, oranges, grapes and pine apples  And fruit medleys

## 2019-02-05 NOTE — PROGRESS NOTES
HISTORY OF PRESENT ILLNESS Megan Chance is a 76 y.o. female. Patient here for f/u after last  visit of Type 2 diabetes mellitus  From  2018 Gained 3 lbs Had viral GE She changed diet She had allergic reaction to ciproflox Old history Lost 4 lbs She is compliant with diet better She has FLU SHE GOT THE METER  
SHE IS C/O FASTING SUGARS Old history : 
 
She is recognizing that she is addicted for sugars She is taking 2 mg bydureon and  
glipizide 10 mg bid She is taking only  metformin She got the log , less consistent She went to vacation Old history :  
Referred : by self/pcp Pt of Dr. Brooklyn Cardozo He transferred care as Dr. Brooklyn Cardozo left H/o diabetes for 5  years Current A1C is 6.9 % from 2013  and symptoms/problems include none Current diabetic medications include glyburide 5 mg 2 pills a day  and victoza 1.8 Current monitoring regimen: home blood tests - 1 times daily Home blood sugar records: trend: fluctuating a bit Any episodes of hypoglycemia? no 
 
Weight trend: increasing steadily Prior visit with dietician: no 
Current diet: \"unhealthy\" diet in general 
Current exercise: no regular exercise Known diabetic complications: none Cardiovascular risk factors: dyslipidemia, diabetes mellitus, obesity, sedentary life style, hypertension, stress, post-menopausal 
 
Eye exam current (within one year): yes MI: no  
 
Past Medical History:  
Diagnosis Date  Diabetes mellitus ([address] Past Surgical History:  
Procedure Laterality Date  HX  SECTION    
 x2 Current Outpatient Medications Medication Sig  
 amLODIPine (NORVASC) 10 mg tablet TAKE 1 TABLET BY MOUTH EVERY DAY  metFORMIN ER (GLUCOPHAGE XR) 500 mg tablet Take 2 Tabs by mouth two (2) times daily (with meals). Stop Sha Lainez  lisinopril (PRINIVIL, ZESTRIL) 10 mg tablet TAKE 1 TABLET BY MOUTH EVERY DAY  
  repaglinide (PRANDIN) 2 mg tablet Take 1 Tab by mouth Before breakfast, lunch, and dinner.  glimepiride (AMARYL) 4 mg tablet Take one tablet at night,stop glipizide  atorvastatin (LIPITOR) 40 mg tablet TAKE 1 TAB BY MOUTH NIGHTLY. STOP 20 MG DOSE  
 glucose blood VI test strips (CONTOUR NEXT TEST STRIPS) strip TEST TWICE A DAY Dx code: E11.65  
 BYDUREON 2 mg/0.65 mL pnij INJECT 1 SYRINGE SUBCUTANEOUSLY ONCE EVERY 7 DAYS  fluconazole (DIFLUCAN) 150 mg tablet Take 1 tablet daily for 3 days No current facility-administered medications for this visit. Review of Systems Constitutional: Negative. HENT: Negative. Eyes: Negative for pain and redness. Respiratory: Negative. Cardiovascular: Negative for chest pain, palpitations and leg swelling. Gastrointestinal: Negative. Negative for constipation. Genitourinary: Negative. Musculoskeletal: Negative for myalgias. Skin: Negative. Neurological: Negative. Endo/Heme/Allergies: Negative. Psychiatric/Behavioral: Negative for depression and memory loss. The patient does not have insomnia. Physical Exam  
Constitutional: She is oriented to person, place, and time. She appears well-developed and well-nourished. HENT:  
Head: Normocephalic. Eyes: Conjunctivae and EOM are normal. Pupils are equal, round, and reactive to light. Neck: Normal range of motion. Neck supple. No JVD present. No tracheal deviation present. No thyromegaly present. Cardiovascular: Normal rate, regular rhythm and normal heart sounds. No murmur heard. Pulmonary/Chest: Breath sounds normal.  
Abdominal: Soft. Bowel sounds are normal.  
Musculoskeletal: Normal range of motion. Lymphadenopathy:  
  She has no cervical adenopathy. Neurological: She is alert and oriented to person, place, and time. She has normal reflexes. Skin: Skin is warm. Psychiatric: She has a normal mood and affect. Diabetic feet exam : march 2018 H/o partial or complete amputation of foot : n 
H/o previous foot ulceration: n 
H/o pre - ulcerative callus : yes H/o peripheral neuropathy and callus : n 
H/o poor circulation     MI   : n 
Foot deformity : over riding second toe left foot , bunions Diabetic foot exam: march 2018 Left: Reflexes nd Vibratory sensation normal  
 Proprioception nd Sharp/dull discrimination nd Filament test normal sensation with micro filament Pulse DP: 2 + Pulse PT: nd 
 Deformities: Yes - over riding second toe, bunion Right: Reflexes nd Vibratory sensation normal 
 Proprioception nd Sharp/dull discrimination nd Filament test normal sensation with micro filament Pulse DP: 2+ (normal) Pulse PT: nd 
 Deformities: Yes - none Lab Results Component Value Date/Time Hemoglobin A1c 7.8 (H) 02/01/2019 09:24 AM  
 Hemoglobin A1c 7.4 (H) 10/29/2018 08:53 AM  
 Hemoglobin A1c 6.9 (H) 06/25/2018 08:38 AM  
 Glucose 195 (H) 02/01/2019 09:24 AM  
 Glucose  05/05/2015 09:18 AM  
 Microalb/Creat ratio (ug/mg creat.) 55.9 (H) 02/01/2019 09:24 AM  
 LDL, calculated 93 02/01/2019 09:24 AM  
 Creatinine 1.19 (H) 02/01/2019 09:24 AM  
  
Lab Results Component Value Date/Time Cholesterol, total 150 02/01/2019 09:24 AM  
 HDL Cholesterol 37 (L) 02/01/2019 09:24 AM  
 LDL, calculated 93 02/01/2019 09:24 AM  
 Triglyceride 101 02/01/2019 09:24 AM  
 
Lab Results Component Value Date/Time ALT (SGPT) 56 (H) 02/01/2019 09:24 AM  
 AST (SGOT) 21 02/01/2019 09:24 AM  
 Alk. phosphatase 152 (H) 02/01/2019 09:24 AM  
 Bilirubin, total 0.4 02/01/2019 09:24 AM  
 
Lab Results Component Value Date/Time  GFR est AA 54 (L) 02/01/2019 09:24 AM  
 GFR est non-AA 47 (L) 02/01/2019 09:24 AM  
 Creatinine 1.19 (H) 02/01/2019 09:24 AM  
 BUN 14 02/01/2019 09:24 AM  
 Sodium 146 (H) 02/01/2019 09:24 AM  
 Potassium 4.4 02/01/2019 09:24 AM  
 Chloride 112 (H) 02/01/2019 09:24 AM  
 CO2 19 (L) 02/01/2019 09:24 AM  
  
 
 
 
 
ASSESSMENT and PLAN 1. Type 2 DM uncontrolled : A1c is   7.8 %     From   Jan 2019       compared to    7.4 %    FROM OCT 2018    COMPARED   TO 6.9 %     From   June 2018    compared to   7.3 %      From    [facility] 2018     Compared to  7.7 %     From sept 2017    compared to    7.5  %     From  May 2017     compared to    7.4 %     From    Feb 2017   Compared to   6.9 %    From nov 2016  Compared to  7.4 %    From aug 2016  Compared to 8.3 %     From may 2016  compared t o  7.1 %     From feb 2016  Compared to    8.2 %     From nov 2015 She used to be  on glyburide 10 mg a day and victoza -- from Dr. Agusto Fox Stay on  bydureon Changed from  [address]   because of medicare to metformin ER 1 gm bid with meals Stopped  glipizide nov 2018 She is NOT compliant with diet often times EXPLAINED THE MEDICATION ACTIONS AT LENGTH  
HER FASTING SUGARS ARE HIGH She got started on AMARYL 4 MG HS AND USE PRANDIN 2 MG TID DOSING Stopped jardiance for increased frequency of urination Not even low dose invokana she could tolerate She started  on  januvia 100 mg a day  Aug 2016 , and had to be stopped for [facility] [place] Explained the medication actions Diet discussed Patient is advised to check blood sugars 1-2 times daily by rotation method. reviewed medications and side effects in detail I suggested following options to improve fasting sugars Explained its benefits and risks 
lab results and schedule of future lab studies reviewed with patient 2. Hypoglycemia :  Educated on treating the hypoglycemia. 3. HTN :  Not well controlled ,  continue on norvasc 10 mg and lisinopril 10 mg  
Proteinuria present Procardia to be d/c d. ..for xtreme dizziness Patient is educated about importance of compliance with anti-hypertensives especially ARB/ACEI 4.   Dyslipidemia : LDL better with compliance  
lipitor 40 mg hs  
 
 
 5. use of aspirin to prevent MI and TIA's discussed 6. Menopause vasomotor symptoms  : she has none of them this time Likely she was hypoglycemic from taking glipizide incorrectly She passed the stress test  
 
 
7. Obesity : Body mass index is 41.03 kg/m². She enjoys food,  And she is on bydureon 
 
 
> 50 % of time is spent on counseling Patient voiced understanding her plan of care

## 2019-02-05 NOTE — PROGRESS NOTES
1. Have you been to the ER, urgent care clinic since your last visit? Sinus Infection/[facility]/Decmeber 2018  Hospitalized since your last visit? No 
 
2. Have you seen or consulted any other health care providers outside of the [address] [place] [address] since your last visit? Include any pap smears or colon screening. No  
 
Wt Readings from Last 3 Encounters:  
02/05/19 224 lb 4.8 oz (101.7 kg) 11/05/18 221 lb 14.4 oz (100.7 kg) 07/02/18 221 lb 14.4 oz (100.7 kg) Temp Readings from Last 3 Encounters:  
02/05/19 96.9 °F (36.1 °C) (Oral) 11/05/18 97.6 °F (36.4 °C) (Oral) 07/02/18 97.6 °F (36.4 °C) (Oral) BP Readings from Last 3 Encounters:  
02/05/19 162/87  
11/05/18 151/82  
07/02/18 148/76 Pulse Readings from Last 3 Encounters:  
02/05/19 79  
11/05/18 82  
07/02/18 95 Lab Results Component Value Date/Time Hemoglobin A1c 7.8 (H) 02/01/2019 09:24 AM  
 Hemoglobin A1c (POC) 7.8 04/21/2014 01:34 PM  
 
Patient has meter today.

## 2019-04-19 DIAGNOSIS — I10 ESSENTIAL HYPERTENSION, BENIGN: ICD-10-CM

## 2019-04-19 RX ORDER — AMLODIPINE BESYLATE 10 MG/1
TABLET ORAL
Qty: 30 TAB | Refills: 4 | Status: SHIPPED | OUTPATIENT
Start: 2019-04-19 | End: 2019-07-19 | Stop reason: SDUPTHER

## 2019-05-28 ENCOUNTER — HOSPITAL ENCOUNTER (OUTPATIENT)
Dept: LAB | Age: 69
Discharge: HOME | End: 2019-05-28
Payer: MEDICARE

## 2019-05-28 DIAGNOSIS — E11.65 TYPE 2 DIABETES MELLITUS WITH HYPERGLYCEMIA, WITHOUT LONG-TERM CURRENT USE OF INSULIN (HCC): ICD-10-CM

## 2019-05-28 PROCEDURE — 83036 HEMOGLOBIN GLYCOSYLATED A1C: CPT

## 2019-05-28 PROCEDURE — 36415 COLL VENOUS BLD VENIPUNCTURE: CPT

## 2019-05-28 PROCEDURE — 82043 UR ALBUMIN QUANTITATIVE: CPT

## 2019-05-28 PROCEDURE — 80053 COMPREHEN METABOLIC PANEL: CPT

## 2019-05-28 PROCEDURE — 80061 LIPID PANEL: CPT

## 2019-05-29 LAB
ALBUMIN SERPL-MCNC: 4.6 G/DL (ref 3.6–4.8)
ALBUMIN/CREAT UR: 57.3 MG/G CREAT (ref 0–30)
ALBUMIN/GLOB SERPL: 1.4 {RATIO} (ref 1.2–2.2)
ALP SERPL-CCNC: 135 IU/L (ref 39–117)
ALT SERPL-CCNC: 13 IU/L (ref 0–32)
AST SERPL-CCNC: 13 IU/L (ref 0–40)
BILIRUB SERPL-MCNC: 0.3 MG/DL (ref 0–1.2)
BUN SERPL-MCNC: 21 MG/DL (ref 8–27)
BUN/CREAT SERPL: 18 (ref 12–28)
CALCIUM SERPL-MCNC: 10.7 MG/DL (ref 8.7–10.3)
CHLORIDE SERPL-SCNC: 105 MMOL/L (ref 96–106)
CHOLEST SERPL-MCNC: 254 MG/DL (ref 100–199)
CO2 SERPL-SCNC: 21 MMOL/L (ref 20–29)
CREAT SERPL-MCNC: 1.2 MG/DL (ref 0.57–1)
CREAT UR-MCNC: 161 MG/DL
EST. AVERAGE GLUCOSE BLD GHB EST-MCNC: 154 MG/DL
GLOBULIN SER CALC-MCNC: 3.2 G/DL (ref 1.5–4.5)
GLUCOSE SERPL-MCNC: 153 MG/DL (ref 65–99)
HBA1C MFR BLD: 7 % (ref 4.8–5.6)
HDLC SERPL-MCNC: 55 MG/DL
INTERPRETATION, 910389: NORMAL
INTERPRETATION: NORMAL
LDLC SERPL CALC-MCNC: 174 MG/DL (ref 0–99)
Lab: NORMAL
MICROALBUMIN UR-MCNC: 92.3 UG/ML
PDF IMAGE, 910387: NORMAL
POTASSIUM SERPL-SCNC: 4.5 MMOL/L (ref 3.5–5.2)
PROT SERPL-MCNC: 7.8 G/DL (ref 6–8.5)
SODIUM SERPL-SCNC: 144 MMOL/L (ref 134–144)
TRIGL SERPL-MCNC: 123 MG/DL (ref 0–149)
VLDLC SERPL CALC-MCNC: 25 MG/DL (ref 5–40)

## 2019-06-05 ENCOUNTER — OFFICE VISIT (OUTPATIENT)
Dept: ENDOCRINOLOGY | Age: 69
End: 2019-06-05

## 2019-06-05 VITALS
OXYGEN SATURATION: 99 % | HEART RATE: 82 BPM | HEIGHT: 62 IN | TEMPERATURE: 97.4 F | BODY MASS INDEX: 40.5 KG/M2 | DIASTOLIC BLOOD PRESSURE: 80 MMHG | WEIGHT: 220.1 LBS | RESPIRATION RATE: 18 BRPM | SYSTOLIC BLOOD PRESSURE: 163 MMHG

## 2019-06-05 DIAGNOSIS — R80.1 PERSISTENT PROTEINURIA: ICD-10-CM

## 2019-06-05 DIAGNOSIS — E11.21 TYPE 2 DIABETES WITH NEPHROPATHY (HCC): ICD-10-CM

## 2019-06-05 DIAGNOSIS — E11.65 TYPE 2 DIABETES MELLITUS WITH HYPERGLYCEMIA, WITHOUT LONG-TERM CURRENT USE OF INSULIN (HCC): Primary | ICD-10-CM

## 2019-06-05 DIAGNOSIS — E66.01 CLASS 3 SEVERE OBESITY DUE TO EXCESS CALORIES WITH SERIOUS COMORBIDITY AND BODY MASS INDEX (BMI) OF 40.0 TO 44.9 IN ADULT (HCC): ICD-10-CM

## 2019-06-05 DIAGNOSIS — I10 ESSENTIAL HYPERTENSION: ICD-10-CM

## 2019-06-05 DIAGNOSIS — E78.2 MIXED HYPERLIPIDEMIA: ICD-10-CM

## 2019-06-05 RX ORDER — ATORVASTATIN CALCIUM 40 MG/1
TABLET, FILM COATED ORAL
Qty: 90 TAB | Refills: 4 | Status: SHIPPED | OUTPATIENT
Start: 2019-06-05 | End: 2020-09-02 | Stop reason: SDUPTHER

## 2019-06-05 RX ORDER — REPAGLINIDE 2 MG/1
2 TABLET ORAL
Qty: 90 TAB | Refills: 6 | Status: SHIPPED | OUTPATIENT
Start: 2019-06-05 | End: 2020-01-08 | Stop reason: SINTOL

## 2019-06-05 NOTE — PATIENT INSTRUCTIONS
lipitor at   40 mg at night       Metformin Er 1 gm twice a day with meals      amaryl 4 mg at night time   ( print it  - she buys it )   prandin 2 mg right before meals  3 times a day       Stay on  bydureon b-cise  2 mg a  Week        Do not skip meals  Do not eat in between meals    Reduce carbs- pasta, rice, potatoes, bread   Do not drink juices or sodas  Donot eat peanut butter and biscuits     Do not eat sugar free cookies and cakes   Do not eat peaches, oranges, grapes and pine apples  And fruit medleys

## 2019-06-05 NOTE — PROGRESS NOTES
HISTORY OF PRESENT ILLNESS  Castillo Chilel is a 76 y.o. female.     Patient here for f/u after last  visit of Type 2 diabetes mellitus  From  2019     She is doing well   Compliant with meds             Old hisory          Gained 3 lbs   Had viral GE   She changed diet   She had allergic reaction to ciproflox          Old history   Lost 4 lbs     She is compliant with diet better   She has FLU     SHE GOT THE METER   SHE IS C/O FASTING SUGARS           Old history :    She is recognizing that she is addicted for sugars     She is taking 2 mg bydureon and   glipizide 10 mg bid     She is taking only  metformin   She got the log , less consistent     She went to vacation      Old history :   Referred : by self/pcp  Pt of Dr. Valerie Beckham   He transferred care as Dr. Valerie Beckham left     H/o diabetes for 5  years     Current A1C is 6.9 % from 2013  and symptoms/problems include none     Current diabetic medications include glyburide 5 mg 2 pills a day  and victoza 1.8    Current monitoring regimen: home blood tests - 1 times daily  Home blood sugar records: trend: fluctuating a bit  Any episodes of hypoglycemia? no    Weight trend: increasing steadily  Prior visit with dietician: no  Current diet: \"unhealthy\" diet in general  Current exercise: no regular exercise    Known diabetic complications: none  Cardiovascular risk factors: dyslipidemia, diabetes mellitus, obesity, sedentary life style, hypertension, stress, post-menopausal    Eye exam current (within one year): yes  MI: no     Past Medical History:   Diagnosis Date    Diabetes mellitus ([facility] [address]      Past Surgical History:   Procedure Laterality Date    HX  SECTION      x2     Current Outpatient Medications   Medication Sig    amLODIPine (NORVASC) 10 mg tablet TAKE 1 TABLET BY MOUTH EVERY DAY    glimepiride (AMARYL) 4 mg tablet Take one tablet at night,stop glipizide    BYDUREON 2 mg/0.65 mL pnij INJECT 1 SYRINGE SUBCUTANEOUSLY ONCE EVERY 7 DAYS    metFORMIN ER (GLUCOPHAGE XR) 500 mg tablet Take 2 Tabs by mouth two (2) times daily (with meals). Stop glumetza    lisinopril (PRINIVIL, ZESTRIL) 10 mg tablet TAKE 1 TABLET BY MOUTH EVERY DAY    repaglinide (PRANDIN) 2 mg tablet Take 1 Tab by mouth Before breakfast, lunch, and dinner.  glucose blood VI test strips (CONTOUR NEXT TEST STRIPS) strip TEST TWICE A DAY Dx code: E11.65    atorvastatin (LIPITOR) 40 mg tablet TAKE 1 TAB BY MOUTH NIGHTLY. STOP 20 MG DOSE    fluconazole (DIFLUCAN) 150 mg tablet Take 1 tablet daily for 3 days     No current facility-administered medications for this visit. Review of Systems   Constitutional: Negative. HENT: Negative. Eyes: Negative for pain and redness. Respiratory: Negative. Cardiovascular: Negative for chest pain, palpitations and leg swelling. Gastrointestinal: Negative. Negative for constipation. Genitourinary: Negative. Musculoskeletal: Negative for myalgias. Skin: Negative. Neurological: Negative. Endo/Heme/Allergies: Negative. Psychiatric/Behavioral: Negative for depression and memory loss. The patient does not have insomnia. Physical Exam   Constitutional: She is oriented to person, place, and time. She appears well-developed and well-nourished. HENT:   Head: Normocephalic. Eyes: Conjunctivae and EOM are normal. Pupils are equal, round, and reactive to light. Neck: Normal range of motion. Neck supple. No JVD present. No tracheal deviation present. No thyromegaly present. Cardiovascular: Normal rate, regular rhythm and normal heart sounds. No murmur heard. Pulmonary/Chest: Breath sounds normal.   Abdominal: Soft. Bowel sounds are normal.   Musculoskeletal: Normal range of motion. Lymphadenopathy:     She has no cervical adenopathy. Neurological: She is alert and oriented to person, place, and time. She has normal reflexes. Skin: Skin is warm. Psychiatric: She has a normal mood and affect. Diabetic feet exam : march 2018     H/o partial or complete amputation of foot : n  H/o previous foot ulceration: n  H/o pre - ulcerative callus : yes   H/o peripheral neuropathy and callus : n  H/o poor circulation     MI   : n  Foot deformity : over riding second toe left foot , bunions       Diabetic foot exam: march 2018    Left: Reflexes nd     Vibratory sensation normal    Proprioception nd   Sharp/dull discrimination nd    Filament test normal sensation with micro filament   Pulse DP: 2 +   Pulse PT: nd   Deformities: Yes - over riding second toe, bunion  Right: Reflexes nd   Vibratory sensation normal   Proprioception nd   Sharp/dull discrimination nd   Filament test normal sensation with micro filament   Pulse DP: 2+ (normal)   Pulse PT: nd   Deformities: Yes - none            Lab Results   Component Value Date/Time    Hemoglobin A1c 7.0 (H) 05/28/2019 09:15 AM    Hemoglobin A1c 7.8 (H) 02/01/2019 09:24 AM    Hemoglobin A1c 7.4 (H) 10/29/2018 08:53 AM    Glucose 153 (H) 05/28/2019 09:15 AM    Glucose  05/05/2015 09:18 AM    Microalb/Creat ratio (ug/mg creat.) 57.3 (H) 05/28/2019 09:15 AM    LDL, calculated 174 (H) 05/28/2019 09:15 AM    Creatinine 1.20 (H) 05/28/2019 09:15 AM      Lab Results   Component Value Date/Time    Cholesterol, total 254 (H) 05/28/2019 09:15 AM    HDL Cholesterol 55 05/28/2019 09:15 AM    LDL, calculated 174 (H) 05/28/2019 09:15 AM    Triglyceride 123 05/28/2019 09:15 AM     Lab Results   Component Value Date/Time    ALT (SGPT) 13 05/28/2019 09:15 AM    AST (SGOT) 13 05/28/2019 09:15 AM    Alk.  phosphatase 135 (H) 05/28/2019 09:15 AM    Bilirubin, total 0.3 05/28/2019 09:15 AM     Lab Results   Component Value Date/Time    GFR est AA 54 (L) 05/28/2019 09:15 AM    GFR est non-AA 47 (L) 05/28/2019 09:15 AM    Creatinine 1.20 (H) 05/28/2019 09:15 AM    BUN 21 05/28/2019 09:15 AM    Sodium 144 05/28/2019 09:15 AM    Potassium 4.5 05/28/2019 09:15 AM    Chloride 105 05/28/2019 09:15 AM    CO2 21 05/28/2019 09:15 AM              ASSESSMENT and PLAN      1. Type 2 DM uncontrolled : A1c is   7 %     From    May 2019     Compared  To  7.8 %     From   Jan 2019       compared to    7.4 %    FROM OCT 2018    COMPARED   TO 6.9 %     From   June 2018    compared to   7.3 %      From    [facility] 2018     Compared to  7.7 %     From sept 2017    compared to    7.5  %     From  May 2017        She is doing so well   Stay on  bydureon  Changed from  [address]   because of medicare to metformin ER 1 gm bid with meals  Stopped  glipizide nov 2018   continue on AMARYL 4 MG HS AND USE PRANDIN 2 MG TID DOSING      Stopped jardiance for increased frequency of urination  Not even low dose invokana she could tolerate   She started  on  januvia 100 mg a day  Aug 2016 , and had to be stopped for RASH      Explained the medication actions  Diet discussed     Patient is advised to check blood sugars 1-2 times daily by rotation method. reviewed medications and side effects in detail  I suggested following options to improve fasting sugars     Explained its benefits and risks  lab results and schedule of future lab studies reviewed with patient    2. Hypoglycemia :  Educated on treating the hypoglycemia. 3. HTN :  Not well controlled ,  continue on norvasc 10 mg and lisinopril 10 mg   Proteinuria present   Procardia to be d/c d. ..for xtreme dizziness   Patient is educated about importance of compliance with anti-hypertensives especially ARB/ACEI    4. Dyslipidemia : LDL better with compliance   lipitor 40 mg hs       5. use of aspirin to prevent MI and TIA's discussed      6. Menopause vasomotor symptoms  : she has none of them this time   Likely she was hypoglycemic from taking glipizide incorrectly    She passed the stress test       7. Obesity : Body mass index is 40.26 kg/m².   She enjoys food,  And she is on bydureon      > 50 % of time is spent on counseling   Patient voiced understanding her plan of care

## 2019-06-05 NOTE — LETTER
6/9/19 Patient: Wendy Adorno YOB: 1950 Date of Visit: 6/5/2019 Danitza Marmolejo MD 
[facility] [id number] [place] [address] VIA Facsimile: [phone] Dear Danitza Marmolejo MD, Thank you for referring Ms. Wendy Adorno to [address] [place] [address] for evaluation. My notes for this consultation are attached. If you have questions, please do not hesitate to call me. I look forward to following your patient along with you. Sincerely, Lillie Soriano MD

## 2019-06-05 NOTE — PROGRESS NOTES
1. Have you been to the ER, urgent care clinic since your last visit? No Hospitalized since your last visit? No    2. Have you seen or consulted any other health care providers outside of the [address] [place] [address] since your last visit? Include any pap smears or colon screening. No       Wt Readings from Last 3 Encounters:   06/05/19 220 lb 1.6 oz (99.8 kg)   02/05/19 224 lb 4.8 oz (101.7 kg)   11/05/18 221 lb 14.4 oz (100.7 kg)     Temp Readings from Last 3 Encounters:   06/05/19 97.4 °F (36.3 °C) (Oral)   02/05/19 96.9 °F (36.1 °C) (Oral)   11/05/18 97.6 °F (36.4 °C) (Oral)     BP Readings from Last 3 Encounters:   06/05/19 163/80   02/05/19 162/87   11/05/18 151/82     Pulse Readings from Last 3 Encounters:   06/05/19 82   02/05/19 79   11/05/18 82     Lab Results   Component Value Date/Time    Hemoglobin A1c 7.0 (H) 05/28/2019 09:15 AM    Hemoglobin A1c (POC) 7.8 04/21/2014 01:34 PM     Patient has meter today.

## 2019-07-16 RX ORDER — FLUCONAZOLE 150 MG/1
150 TABLET ORAL DAILY
Qty: 1 TAB | Refills: 0 | Status: SHIPPED | OUTPATIENT
Start: 2019-07-16 | End: 2019-07-17

## 2019-07-19 DIAGNOSIS — I10 ESSENTIAL HYPERTENSION, BENIGN: ICD-10-CM

## 2019-07-19 RX ORDER — AMLODIPINE BESYLATE 10 MG/1
TABLET ORAL
Qty: 30 TAB | Refills: 4 | Status: SHIPPED | OUTPATIENT
Start: 2019-07-19 | End: 2019-10-20 | Stop reason: SDUPTHER

## 2019-10-20 DIAGNOSIS — I10 ESSENTIAL HYPERTENSION, BENIGN: ICD-10-CM

## 2019-10-20 RX ORDER — AMLODIPINE BESYLATE 10 MG/1
TABLET ORAL
Qty: 90 TAB | Refills: 1 | Status: SHIPPED | OUTPATIENT
Start: 2019-10-20 | End: 2020-05-19 | Stop reason: SDUPTHER

## 2019-10-24 ENCOUNTER — OP HISTORICAL/CONVERTED ENCOUNTER (OUTPATIENT)
Dept: OTHER | Age: 69
End: 2019-10-24

## 2019-11-09 RX ORDER — LISINOPRIL 10 MG/1
TABLET ORAL
Qty: 90 TAB | Refills: 4 | Status: SHIPPED | OUTPATIENT
Start: 2019-11-09 | End: 2020-11-19 | Stop reason: SDUPTHER

## 2019-11-19 DIAGNOSIS — E11.9 DIABETES MELLITUS WITHOUT COMPLICATION (HCC): ICD-10-CM

## 2019-11-19 RX ORDER — EXENATIDE 2 MG/.65ML
INJECTION, SUSPENSION, EXTENDED RELEASE SUBCUTANEOUS
Qty: 2.6 ML | Refills: 6 | Status: SHIPPED | OUTPATIENT
Start: 2019-11-19 | End: 2020-09-02 | Stop reason: SDUPTHER

## 2019-12-06 ENCOUNTER — HOSPITAL ENCOUNTER (OUTPATIENT)
Dept: LAB | Age: 69
Discharge: HOME | End: 2019-12-06

## 2019-12-06 DIAGNOSIS — R80.1 PERSISTENT PROTEINURIA: ICD-10-CM

## 2019-12-06 DIAGNOSIS — E78.2 MIXED HYPERLIPIDEMIA: ICD-10-CM

## 2019-12-06 DIAGNOSIS — I10 ESSENTIAL HYPERTENSION: ICD-10-CM

## 2019-12-06 DIAGNOSIS — E11.65 TYPE 2 DIABETES MELLITUS WITH HYPERGLYCEMIA, WITHOUT LONG-TERM CURRENT USE OF INSULIN (HCC): ICD-10-CM

## 2019-12-06 DIAGNOSIS — E66.01 CLASS 3 SEVERE OBESITY DUE TO EXCESS CALORIES WITH SERIOUS COMORBIDITY AND BODY MASS INDEX (BMI) OF 40.0 TO 44.9 IN ADULT (HCC): ICD-10-CM

## 2019-12-06 LAB
ALBUMIN SERPL-MCNC: 4.2 G/DL (ref 3.5–5)
ALBUMIN/GLOB SERPL: 1.2 {RATIO} (ref 1.1–2.2)
ALP SERPL-CCNC: 241 U/L (ref 45–117)
ALT SERPL-CCNC: 40 U/L (ref 12–78)
ANION GAP SERPL CALC-SCNC: 6 MMOL/L (ref 5–15)
AST SERPL-CCNC: 22 U/L (ref 15–37)
BILIRUB SERPL-MCNC: 0.4 MG/DL (ref 0.2–1)
BUN SERPL-MCNC: 19 MG/DL (ref 6–20)
BUN/CREAT SERPL: 16 (ref 12–20)
CALCIUM SERPL-MCNC: 9.8 MG/DL (ref 8.5–10.1)
CHLORIDE SERPL-SCNC: 110 MMOL/L (ref 97–108)
CHOLEST SERPL-MCNC: 157 MG/DL
CO2 SERPL-SCNC: 25 MMOL/L (ref 21–32)
COMMENT, HOLDF: NORMAL
CREAT SERPL-MCNC: 1.19 MG/DL (ref 0.55–1.02)
CREAT UR-MCNC: 145 MG/DL
EST. AVERAGE GLUCOSE BLD GHB EST-MCNC: 171 MG/DL
GLOBULIN SER CALC-MCNC: 3.6 G/DL (ref 2–4)
GLUCOSE SERPL-MCNC: 152 MG/DL (ref 65–100)
HBA1C MFR BLD: 7.6 % (ref 4–5.6)
HDLC SERPL-MCNC: 51 MG/DL
HDLC SERPL: 3.1 {RATIO} (ref 0–5)
LDLC SERPL CALC-MCNC: 88.2 MG/DL (ref 0–100)
LIPID PROFILE,FLP: NORMAL
MICROALBUMIN UR-MCNC: 14.8 MG/DL
MICROALBUMIN/CREAT UR-RTO: 102 MG/G (ref 0–30)
POTASSIUM SERPL-SCNC: 4.4 MMOL/L (ref 3.5–5.1)
PROT SERPL-MCNC: 7.8 G/DL (ref 6.4–8.2)
SAMPLES BEING HELD,HOLD: NORMAL
SODIUM SERPL-SCNC: 141 MMOL/L (ref 136–145)
TRIGL SERPL-MCNC: 89 MG/DL (ref ?–150)
VLDLC SERPL CALC-MCNC: 17.8 MG/DL

## 2020-01-08 ENCOUNTER — OFFICE VISIT (OUTPATIENT)
Dept: ENDOCRINOLOGY | Age: 70
End: 2020-01-08

## 2020-01-08 VITALS
BODY MASS INDEX: 40.12 KG/M2 | WEIGHT: 218 LBS | TEMPERATURE: 96.7 F | SYSTOLIC BLOOD PRESSURE: 134 MMHG | OXYGEN SATURATION: 100 % | HEART RATE: 84 BPM | HEIGHT: 62 IN | DIASTOLIC BLOOD PRESSURE: 74 MMHG | RESPIRATION RATE: 18 BRPM

## 2020-01-08 DIAGNOSIS — E11.65 UNCONTROLLED TYPE 2 DIABETES MELLITUS WITH HYPERGLYCEMIA (HCC): Primary | ICD-10-CM

## 2020-01-08 DIAGNOSIS — I10 ESSENTIAL HYPERTENSION: ICD-10-CM

## 2020-01-08 DIAGNOSIS — E66.01 OBESITY, MORBID (HCC): ICD-10-CM

## 2020-01-08 DIAGNOSIS — R80.1 PERSISTENT PROTEINURIA: ICD-10-CM

## 2020-01-08 RX ORDER — GLIMEPIRIDE 4 MG/1
4 TABLET ORAL
Qty: 30 TAB | Refills: 6 | Status: SHIPPED | OUTPATIENT
Start: 2020-01-08 | End: 2020-09-02 | Stop reason: SDUPTHER

## 2020-01-08 NOTE — PROGRESS NOTES
HISTORY OF PRESENT ILLNESS  Delroy Patricia is a 71 y.o. female. Patient here for f/u after last  visit of Type 2 diabetes mellitus  From 2019     She is doing well   Compliant with meds     C/o yeast infections from prandin , then she says amaryl   She stopped it a month ago           Old history :   Referred : by self/pcp  Pt of Dr. Douglas Montenegro   He transferred care as Dr. Douglas Montenegro left     H/o diabetes for 5  years     Current A1C is 6.9 % from 2013  and symptoms/problems include none     Current diabetic medications include glyburide 5 mg 2 pills a day  and victoza 1.8    Current monitoring regimen: home blood tests - 1 times daily  Home blood sugar records: trend: fluctuating a bit  Any episodes of hypoglycemia? no    Weight trend: increasing steadily  Prior visit with dietician: no  Current diet: \"unhealthy\" diet in general  Current exercise: no regular exercise    Known diabetic complications: none  Cardiovascular risk factors: dyslipidemia, diabetes mellitus, obesity, sedentary life style, hypertension, stress, post-menopausal    Eye exam current (within one year): yes  MI: no     Past Medical History:   Diagnosis Date    Diabetes mellitus ([address]      Past Surgical History:   Procedure Laterality Date    HX  SECTION      x2     Current Outpatient Medications   Medication Sig    BYDUREON 2 mg/0.65 mL pnij  INJECT 1 SYRINGE SUBCUTANEOUSLY ONCE EVERY 7 DAYS    lisinopril (PRINIVIL, ZESTRIL) 10 mg tablet TAKE 1 TABLET BY MOUTH EVERY DAY    amLODIPine (NORVASC) 10 mg tablet TAKE 1 TABLET BY MOUTH EVERY DAY    glucose blood VI test strips (CONTOUR NEXT TEST STRIPS) strip USE AS DIRECTED TWICE A DAY    atorvastatin (LIPITOR) 40 mg tablet TAKE 1 TAB BY MOUTH NIGHTLY. STOP 20 MG DOSE    glimepiride (AMARYL) 4 mg tablet Take one tablet at night,stop glipizide    metFORMIN ER (GLUCOPHAGE XR) 500 mg tablet Take 2 Tabs by mouth two (2) times daily (with meals).  Stop glumetza    repaglinide (PRANDIN) 2 mg tablet Take 1 Tab by mouth Before breakfast, lunch, and dinner. No current facility-administered medications for this visit. Review of Systems   Constitutional: Negative. HENT: Negative. Eyes: Negative for pain and redness. Respiratory: Negative. Cardiovascular: Negative for chest pain, palpitations and leg swelling. Gastrointestinal: Negative. Negative for constipation. Genitourinary: Negative. Musculoskeletal: Negative for myalgias. Skin: Negative. Neurological: Negative. Endo/Heme/Allergies: Negative. Psychiatric/Behavioral: Negative for depression and memory loss. The patient does not have insomnia. Physical Exam   Constitutional: She is oriented to person, place, and time. She appears well-developed and well-nourished. HENT:   Head: Normocephalic. Eyes: Conjunctivae and EOM are normal. Pupils are equal, round, and reactive to light. Neck: Normal range of motion. Neck supple. No JVD present. No tracheal deviation present. No thyromegaly present. Cardiovascular: Normal rate, regular rhythm and normal heart sounds. No murmur heard. Pulmonary/Chest: Breath sounds normal.   Abdominal: Soft. Bowel sounds are normal.   Musculoskeletal: Normal range of motion. Lymphadenopathy:     She has no cervical adenopathy. Neurological: She is alert and oriented to person, place, and time. She has normal reflexes. Skin: Skin is warm. Psychiatric: She has a normal mood and affect.        Diabetic feet exam : march 2018     H/o partial or complete amputation of foot : n  H/o previous foot ulceration: n  H/o pre - ulcerative callus : yes   H/o peripheral neuropathy and callus : n  H/o poor circulation     MI   : n  Foot deformity : over riding second toe left foot , bunions       Diabetic foot exam: march 2018    Left: Reflexes nd     Vibratory sensation normal    Proprioception nd   Sharp/dull discrimination nd    Filament test normal sensation with micro filament   Pulse DP: 2 +   Pulse PT: nd   Deformities: Yes - over riding second toe, bunion  Right: Reflexes nd   Vibratory sensation normal   Proprioception nd   Sharp/dull discrimination nd   Filament test normal sensation with micro filament   Pulse DP: 2+ (normal)   Pulse PT: nd   Deformities: Yes - none            Lab Results   Component Value Date/Time    Hemoglobin A1c 7.6 (H) 12/06/2019 10:05 AM    Hemoglobin A1c 7.0 (H) 05/28/2019 09:15 AM    Hemoglobin A1c 7.8 (H) 02/01/2019 09:24 AM    Glucose 152 (H) 12/06/2019 10:05 AM    Glucose  05/05/2015 09:18 AM    Microalbumin/Creat ratio (mg/g creat) 102 (H) 12/06/2019 10:05 AM    Microalbumin,urine random 14.80 12/06/2019 10:05 AM    LDL, calculated 88.2 12/06/2019 10:05 AM    Creatinine 1.19 (H) 12/06/2019 10:05 AM      Lab Results   Component Value Date/Time    Cholesterol, total 157 12/06/2019 10:05 AM    HDL Cholesterol 51 12/06/2019 10:05 AM    LDL, calculated 88.2 12/06/2019 10:05 AM    Triglyceride 89 12/06/2019 10:05 AM    CHOL/HDL Ratio 3.1 12/06/2019 10:05 AM     Lab Results   Component Value Date/Time    ALT (SGPT) 40 12/06/2019 10:05 AM    AST (SGOT) 22 12/06/2019 10:05 AM    Alk. phosphatase 241 (H) 12/06/2019 10:05 AM    Bilirubin, total 0.4 12/06/2019 10:05 AM     Lab Results   Component Value Date/Time    GFR est AA 55 (L) 12/06/2019 10:05 AM    GFR est non-AA 45 (L) 12/06/2019 10:05 AM    Creatinine 1.19 (H) 12/06/2019 10:05 AM    BUN 19 12/06/2019 10:05 AM    Sodium 141 12/06/2019 10:05 AM    Potassium 4.4 12/06/2019 10:05 AM    Chloride 110 (H) 12/06/2019 10:05 AM    CO2 25 12/06/2019 10:05 AM              ASSESSMENT and PLAN      1. Type 2 DM uncontrolled : A1c is   7 %     From    May 2019     Compared  To  7.8 %     From   Jan 2019       compared to    7.4 %    FROM OCT 2018    COMPARED   TO 6.9 %     From   June 2018    compared to   7.3 %      From    [facility] 2018     Compared to  7.7 %     From sept 2017    compared to    7.5  % From  May 2017        She is doing so well   Stay on  bydureon  Changed from  [address]   because of medicare to metformin ER 1 gm bid with meals  Stopped  glipizide nov 2018   continue on AMARYL 4 MG HS AND USE PRANDIN 2 MG TID DOSING  Reassured her several times thatboth meds cannot bring on yeast infections     Stopped jardiance for increased frequency of urination  Not even low dose invokana she could tolerate   She started  on  januvia 100 mg a day  Aug 2016 , and had to be stopped for RASH    Diet discussed     Patient is advised to check blood sugars 1-2 times daily by rotation method. reviewed medications and side effects in detail  I suggested following options to improve fasting sugars     Explained its benefits and risks  lab results and schedule of future lab studies reviewed with patient    2. Hypoglycemia :  Educated on treating the hypoglycemia. 3. HTN :  Not well controlled ,  continue on norvasc 10 mg and lisinopril 10 mg   Proteinuria present   Procardia to be d/c d. ..for xtreme dizziness   Patient is educated about importance of compliance with anti-hypertensives especially ARB/ACEI    4. Dyslipidemia : LDL better with compliance   lipitor 40 mg hs       5. use of aspirin to prevent MI and TIA's discussed      6. Menopause vasomotor symptoms  : she has none of them this time   Likely she was hypoglycemic from taking glipizide incorrectly    She passed the stress test       7. Obesity : Body mass index is 39.87 kg/m².   She enjoys food,  And she is on bydureon      > 50 % of time is spent on counseling   Patient voiced understanding her plan of care

## 2020-01-08 NOTE — LETTER
1/9/20 Patient: Reno Tijerina YOB: 1950 Date of Visit: 1/8/2020 Seymour Boxer, MD 
[place] [id number] [place] [address] VIA Facsimile: [phone] Dear Seymour Boxer, MD, Thank you for referring Ms. Reno Tijerina to [address] [place] [address] for evaluation. My notes for this consultation are attached. If you have questions, please do not hesitate to call me. I look forward to following your patient along with you. Sincerely, Celine Sprague MD

## 2020-01-08 NOTE — PATIENT INSTRUCTIONS
lipitor at   40 mg at night     Metformin Er 1 gm twice a day with meals     Resume  amaryl 4 mg at night time   ( print it  - she buys it )      prandin 2 mg   Three  times a day before each meal     Stay on  bydureon b-cise  2 mg a  Week      -----------------------------------------------------------------------------      Do not skip meals  Do not eat in between meals    Reduce carbs- pasta, rice, potatoes, bread   Do not drink juices or sodas  Donot eat peanut butter and biscuits     Do not eat sugar free cookies and cakes   Do not eat peaches, oranges, grapes and pine apples  And fruit medleys

## 2020-01-23 ENCOUNTER — TELEPHONE (OUTPATIENT)
Dept: ENDOCRINOLOGY | Age: 70
End: 2020-01-23

## 2020-01-28 RX ORDER — METFORMIN HYDROCHLORIDE 500 MG/1
1000 TABLET, EXTENDED RELEASE ORAL 2 TIMES DAILY WITH MEALS
Qty: 360 TAB | Refills: 4 | Status: SHIPPED | OUTPATIENT
Start: 2020-01-28 | End: 2021-01-04 | Stop reason: SDUPTHER

## 2020-04-02 ENCOUNTER — TELEPHONE (OUTPATIENT)
Dept: ENDOCRINOLOGY | Age: 70
End: 2020-04-02

## 2020-04-02 DIAGNOSIS — E78.2 MIXED HYPERLIPIDEMIA: ICD-10-CM

## 2020-04-02 DIAGNOSIS — I10 ESSENTIAL HYPERTENSION: ICD-10-CM

## 2020-04-02 DIAGNOSIS — E11.65 TYPE 2 DIABETES MELLITUS WITH HYPERGLYCEMIA, WITHOUT LONG-TERM CURRENT USE OF INSULIN (HCC): Primary | ICD-10-CM

## 2020-04-02 NOTE — TELEPHONE ENCOUNTER
Informed pt lab appt will be canceled. Lab slips will be mailed to pt to have done at a [facility]. Also informed pt that upcoming appt will be converted to a virtual visit. The  will contact pt with further info.     Order placed for pt per verbal order with read back from Dr. Darylene Arabia 04/02/20

## 2020-05-14 LAB — LDL-C, EXTERNAL: 103

## 2020-05-19 DIAGNOSIS — I10 ESSENTIAL HYPERTENSION, BENIGN: ICD-10-CM

## 2020-05-19 RX ORDER — AMLODIPINE BESYLATE 10 MG/1
TABLET ORAL
Qty: 90 TAB | Refills: 1 | Status: SHIPPED | OUTPATIENT
Start: 2020-05-19 | End: 2020-09-02 | Stop reason: SDUPTHER

## 2020-08-15 LAB — CREATININE, EXTERNAL: 1.08

## 2020-09-02 ENCOUNTER — OFFICE VISIT (OUTPATIENT)
Dept: ENDOCRINOLOGY | Age: 70
End: 2020-09-02
Payer: MEDICARE

## 2020-09-02 VITALS
BODY MASS INDEX: 39.75 KG/M2 | WEIGHT: 216 LBS | DIASTOLIC BLOOD PRESSURE: 89 MMHG | RESPIRATION RATE: 12 BRPM | SYSTOLIC BLOOD PRESSURE: 153 MMHG | TEMPERATURE: 98.1 F | HEART RATE: 90 BPM | HEIGHT: 62 IN

## 2020-09-02 DIAGNOSIS — E78.2 MIXED HYPERLIPIDEMIA: ICD-10-CM

## 2020-09-02 DIAGNOSIS — R80.1 PERSISTENT PROTEINURIA: ICD-10-CM

## 2020-09-02 DIAGNOSIS — E11.21 TYPE 2 DIABETES WITH NEPHROPATHY (HCC): Primary | ICD-10-CM

## 2020-09-02 DIAGNOSIS — E11.9 DIABETES MELLITUS WITHOUT COMPLICATION (HCC): ICD-10-CM

## 2020-09-02 DIAGNOSIS — E11.65 TYPE 2 DIABETES MELLITUS WITH HYPERGLYCEMIA, WITHOUT LONG-TERM CURRENT USE OF INSULIN (HCC): ICD-10-CM

## 2020-09-02 DIAGNOSIS — I10 ESSENTIAL HYPERTENSION, BENIGN: ICD-10-CM

## 2020-09-02 DIAGNOSIS — I10 ESSENTIAL HYPERTENSION: ICD-10-CM

## 2020-09-02 LAB — HBA1C MFR BLD HPLC: 8.5 %

## 2020-09-02 PROCEDURE — 3052F HG A1C>EQUAL 8.0%<EQUAL 9.0%: CPT | Performed by: INTERNAL MEDICINE

## 2020-09-02 PROCEDURE — 2022F DILAT RTA XM EVC RTNOPTHY: CPT | Performed by: INTERNAL MEDICINE

## 2020-09-02 PROCEDURE — 1090F PRES/ABSN URINE INCON ASSESS: CPT | Performed by: INTERNAL MEDICINE

## 2020-09-02 PROCEDURE — 3017F COLORECTAL CA SCREEN DOC REV: CPT | Performed by: INTERNAL MEDICINE

## 2020-09-02 PROCEDURE — G8753 SYS BP > OR = 140: HCPCS | Performed by: INTERNAL MEDICINE

## 2020-09-02 PROCEDURE — 1101F PT FALLS ASSESS-DOCD LE1/YR: CPT | Performed by: INTERNAL MEDICINE

## 2020-09-02 PROCEDURE — G8417 CALC BMI ABV UP PARAM F/U: HCPCS | Performed by: INTERNAL MEDICINE

## 2020-09-02 PROCEDURE — G8536 NO DOC ELDER MAL SCRN: HCPCS | Performed by: INTERNAL MEDICINE

## 2020-09-02 PROCEDURE — 83036 HEMOGLOBIN GLYCOSYLATED A1C: CPT | Performed by: INTERNAL MEDICINE

## 2020-09-02 PROCEDURE — G8754 DIAS BP LESS 90: HCPCS | Performed by: INTERNAL MEDICINE

## 2020-09-02 PROCEDURE — G8400 PT W/DXA NO RESULTS DOC: HCPCS | Performed by: INTERNAL MEDICINE

## 2020-09-02 PROCEDURE — G8427 DOCREV CUR MEDS BY ELIG CLIN: HCPCS | Performed by: INTERNAL MEDICINE

## 2020-09-02 PROCEDURE — G8510 SCR DEP NEG, NO PLAN REQD: HCPCS | Performed by: INTERNAL MEDICINE

## 2020-09-02 PROCEDURE — 99214 OFFICE O/P EST MOD 30 MIN: CPT | Performed by: INTERNAL MEDICINE

## 2020-09-02 RX ORDER — EXENATIDE 2 MG/.65ML
INJECTION, SUSPENSION, EXTENDED RELEASE SUBCUTANEOUS
Qty: 2.6 ML | Refills: 6 | Status: SHIPPED | OUTPATIENT
Start: 2020-09-02 | End: 2020-12-28 | Stop reason: SDUPTHER

## 2020-09-02 RX ORDER — GLIMEPIRIDE 4 MG/1
4 TABLET ORAL
Qty: 30 TAB | Refills: 6 | Status: SHIPPED | OUTPATIENT
Start: 2020-09-02 | End: 2021-05-11

## 2020-09-02 RX ORDER — ATORVASTATIN CALCIUM 40 MG/1
TABLET, FILM COATED ORAL
Qty: 90 TAB | Refills: 4 | Status: SHIPPED | OUTPATIENT
Start: 2020-09-02 | End: 2021-11-03

## 2020-09-02 NOTE — PROGRESS NOTES
HISTORY OF PRESENT ILLNESS  Razia Beverly is a 79 y.o. female. Patient here for f/u after last  visit of Type 2 diabetes mellitus  From 2020     She has saved a lot of money as she was spending on traveling   She says she has lot of money and is eager to get [place] Charlie           2020    She is doing well   Compliant with meds   C/o yeast infections from prandin , then she says amaryl   She stopped it a month ago           Old history :   Referred : by self/pcp  Pt of Dr. Magda Leyva   He transferred care as Dr. Magda Leyva left   H/o diabetes for 5  years   Current A1C is 6.9 % from 2013  and symptoms/problems include none   Current diabetic medications include glyburide 5 mg 2 pills a day  and victoza 1.8  Current monitoring regimen: home blood tests - 1 times daily  Home blood sugar records: trend: fluctuating a bit  Any episodes of hypoglycemia? no    Weight trend: increasing steadily  Prior visit with dietician: no  Current diet: \"unhealthy\" diet in general  Current exercise: no regular exercise    Known diabetic complications: none  Cardiovascular risk factors: dyslipidemia, diabetes mellitus, obesity, sedentary life style, hypertension, stress, post-menopausal    Eye exam current (within one year): yes  MI: no     Past Medical History:   Diagnosis Date    Diabetes mellitus ([facility] [address]      Past Surgical History:   Procedure Laterality Date    HX  SECTION      x2     Current Outpatient Medications   Medication Sig    repaglinide (PRANDIN) 2 mg tablet TAKE 1 TABLET BY MOUTH ONCE DAILY BEFORE BREAKFAST, LUNCH, AND DINNER    amLODIPine (NORVASC) 10 mg tablet TAKE 1 TABLET BY MOUTH EVERY DAY    metFORMIN ER (GLUCOPHAGE XR) 500 mg tablet TAKE 2 TABS BY MOUTH TWO (2) TIMES DAILY (WITH MEALS). STOP GLUMETZA    glimepiride (AMARYL) 4 mg tablet Take 1 Tab by mouth nightly.     BYDUREON 2 mg/0.65 mL pnij  INJECT 1 SYRINGE SUBCUTANEOUSLY ONCE EVERY 7 DAYS    lisinopril (PRINIVIL, ZESTRIL) 10 mg tablet TAKE 1 TABLET BY MOUTH EVERY DAY    atorvastatin (LIPITOR) 40 mg tablet TAKE 1 TAB BY MOUTH NIGHTLY. STOP 20 MG DOSE    glucose blood VI test strips (CONTOUR NEXT TEST STRIPS) strip USE AS DIRECTED TWICE A DAY. Dx code E11.65     No current facility-administered medications for this visit. Review of Systems   Constitutional: Negative. HENT: Negative. Eyes: Negative for pain and redness. Respiratory: Negative. Cardiovascular: Negative for chest pain, palpitations and leg swelling. Gastrointestinal: Negative. Negative for constipation. Genitourinary: Negative. Musculoskeletal: Negative for myalgias. Skin: Negative. Neurological: Negative. Endo/Heme/Allergies: Negative. Psychiatric/Behavioral: Negative for depression and memory loss. The patient does not have insomnia. Physical Exam   Constitutional: She is oriented to person, place, and time. She appears well-developed and well-nourished. HENT:   Head: Normocephalic. Eyes: Conjunctivae and EOM are normal. Pupils are equal, round, and reactive to light. Neck: Normal range of motion. Neck supple. No JVD present. No tracheal deviation present. No thyromegaly present. Cardiovascular: Normal rate, regular rhythm and normal heart sounds. No murmur heard. Pulmonary/Chest: Breath sounds normal.   Abdominal: Soft. Bowel sounds are normal.   Musculoskeletal: Normal range of motion. Lymphadenopathy:     She has no cervical adenopathy. Neurological: She is alert and oriented to person, place, and time. She has normal reflexes. Skin: Skin is warm. Psychiatric: She has a normal mood and affect.        Lab Results   Component Value Date/Time    Hemoglobin A1c 7.6 (H) 12/06/2019 10:05 AM    Hemoglobin A1c 7.0 (H) 05/28/2019 09:15 AM    Hemoglobin A1c 7.8 (H) 02/01/2019 09:24 AM    Glucose 152 (H) 12/06/2019 10:05 AM    Glucose  05/05/2015 09:18 AM    Microalbumin/Creat ratio (mg/g creat) 102 (H) 12/06/2019 10:05 AM Microalbumin,urine random 14.80 12/06/2019 10:05 AM    LDL, calculated 88.2 12/06/2019 10:05 AM    Creatinine 1.19 (H) 12/06/2019 10:05 AM      Lab Results   Component Value Date/Time    Cholesterol, total 157 12/06/2019 10:05 AM    HDL Cholesterol 51 12/06/2019 10:05 AM    LDL, calculated 88.2 12/06/2019 10:05 AM    Triglyceride 89 12/06/2019 10:05 AM    CHOL/HDL Ratio 3.1 12/06/2019 10:05 AM     Lab Results   Component Value Date/Time    ALT (SGPT) 40 12/06/2019 10:05 AM    Alk. phosphatase 241 (H) 12/06/2019 10:05 AM    Bilirubin, total 0.4 12/06/2019 10:05 AM     Lab Results   Component Value Date/Time    GFR est AA 55 (L) 12/06/2019 10:05 AM    GFR est non-AA 45 (L) 12/06/2019 10:05 AM    Creatinine 1.19 (H) 12/06/2019 10:05 AM    BUN 19 12/06/2019 10:05 AM    Sodium 141 12/06/2019 10:05 AM    Potassium 4.4 12/06/2019 10:05 AM    Chloride 110 (H) 12/06/2019 10:05 AM    CO2 25 12/06/2019 10:05 AM              ASSESSMENT and PLAN      1. Type 2 DM uncontrolled : A1c is   8.5 %    From today by POC      Compared to  7.6 %     From   May 2020   Compared tp      7.6 %     From   Dec 2019     Compared to     7 %     From    May 2019     Compared  To  7.8 %     From   Jan 2019       compared to    7.4 %    FROM OCT 2018    COMPARED   TO 6.9 %     From   June 2018    compared to   7.3 %      From    march 2018     Compared to  7.7 %     From sept 2017    compared to    7.5  %     From  May 2017          Sept 2020   Lost control   Sitting home - enjoying foods   She does not check home    [employer] use - self pay     Stay on  amaryl  4 mg , prandin tid, bydureon, metformin er 1 gm bid       Jan 2020  :      She is doing so well   Stay on  bydureon  Changed from  [address]   because of medicare to metformin ER 1 gm bid with meals  Stopped  glipizide nov 2018   continue on AMARYL 4 MG HS AND USE PRANDIN 2 MG TID DOSING  Reassured her several times thatboth meds cannot bring on yeast infections     Stopped jardiance for increased frequency of urination  Not even low dose invokana she could tolerate   She started  on  januvia 100 mg a day  Aug 2016 , and had to be stopped for RASH  Diet discussed     Patient is advised to check blood sugars 1-2 times daily by rotation method. Explained its benefits and risks  lab results and schedule of future lab studies reviewed with patient    2. Hypoglycemia :  Educated on treating the hypoglycemia. 3. HTN :  Not well controlled ,  continue on norvasc 10 mg and lisinopril 10 mg   Proteinuria present   Procardia to be d/c d. ..for xtreme dizziness   She f/u with  Dr. Whelan Friend        4. Dyslipidemia : LDL better with compliance   lipitor 40 mg hs       5. use of aspirin to prevent MI and TIA's discussed      6. Menopause vasomotor symptoms  : she has none of them this time   Likely she was hypoglycemic from taking glipizide incorrectly    She passed the stress test       7. Obesity : Body mass index is 39.51 kg/m².   She enjoys food,  And she is on bydureon      > 50 % of time is spent on counseling   Patient voiced understanding her plan of care

## 2020-09-02 NOTE — PROGRESS NOTES
Wt Readings from Last 3 Encounters:   09/02/20 216 lb (98 kg)   01/08/20 218 lb (98.9 kg)   06/05/19 220 lb 1.6 oz (99.8 kg)     Temp Readings from Last 3 Encounters:   09/02/20 98.1 °F (36.7 °C) (Oral)   01/08/20 96.7 °F (35.9 °C) (Oral)   06/05/19 97.4 °F (36.3 °C) (Oral)     BP Readings from Last 3 Encounters:   09/02/20 174/86   01/08/20 134/74   06/05/19 163/80     Pulse Readings from Last 3 Encounters:   09/02/20 85   01/08/20 84   06/05/19 82     Lab Results   Component Value Date/Time    Hemoglobin A1c 7.6 (H) 12/06/2019 10:05 AM    Hemoglobin A1c (POC) 7.8 04/21/2014 01:34 PM

## 2020-09-02 NOTE — LETTER
9/2/20 Patient: Ida Parsons YOB: 1950 Date of Visit: 9/2/2020 Cristobal Littlejohn MD 
[facility] [id number] [address] VIA In Basket Dear Cristobal Littlejohn MD, Thank you for referring Ms. Ida Parsons to [address] for evaluation. My notes for this consultation are attached. If you have questions, please do not hesitate to call me. I look forward to following your patient along with you. Sincerely, Carline Cardona MD

## 2020-09-02 NOTE — PATIENT INSTRUCTIONS
To establish the remote access to your glucose log - follow these steps -  Download an dasha - freestyle karlos  
 
-establish \" [employer] \" account by visiting  [employer] website  [email or web address] 
 
-Accept our invite waiting for you in the email which you have used to set up the account Or Enter  \"[email or web address]\"  ( our practice unique id ) By doing so, you link up to our office account  
 
 
- if you are using phone as your scanner , we can access your glucose data. - If you are using the ,  Upload the data on to the account via a computer/laptop How to do that can be accessed again by going onto the website  [employer]  
 
 
 
 
 
--------------------------------------------------------------------------------------- 
 
 lipitor at   40 mg at night Metformin Er 1 gm twice a day with meals  
 
 amaryl 4 mg at night time   ( print it  - she buys it )   
 
prandin 2 mg   Three  times a day before each meal  
 
Stay on  bydureon b-cise  2 mg a  Week   
 
-------------------------------------------------------- Do not skip meals Do not eat in between meals Reduce carbs- pasta, rice, potatoes, bread Do not drink juices or sodas Donot eat peanut butter and biscuits Do not eat sugar free cookies and cakes Do not eat peaches, oranges, grapes and pine apples  And fruit medleys

## 2020-09-11 VITALS
HEART RATE: 87 BPM | BODY MASS INDEX: 39.61 KG/M2 | SYSTOLIC BLOOD PRESSURE: 162 MMHG | HEIGHT: 62 IN | TEMPERATURE: 96.9 F | OXYGEN SATURATION: 98 % | WEIGHT: 215.25 LBS | DIASTOLIC BLOOD PRESSURE: 92 MMHG

## 2020-09-11 PROBLEM — R35.0 INCREASED FREQUENCY OF URINATION: Status: ACTIVE | Noted: 2020-09-11

## 2020-09-17 ENCOUNTER — HOSPITAL ENCOUNTER (EMERGENCY)
Age: 70
Discharge: HOME | End: 2020-09-17
Attending: EMERGENCY MEDICINE
Payer: MEDICARE

## 2020-09-17 ENCOUNTER — APPOINTMENT (OUTPATIENT)
Dept: GENERAL RADIOLOGY | Age: 70
End: 2020-09-17
Attending: EMERGENCY MEDICINE
Payer: MEDICARE

## 2020-09-17 VITALS
OXYGEN SATURATION: 98 % | WEIGHT: 215 LBS | BODY MASS INDEX: 38.09 KG/M2 | HEIGHT: 63 IN | HEART RATE: 92 BPM | DIASTOLIC BLOOD PRESSURE: 89 MMHG | RESPIRATION RATE: 18 BRPM | SYSTOLIC BLOOD PRESSURE: 146 MMHG | TEMPERATURE: 98.5 F

## 2020-09-17 DIAGNOSIS — M19.072 DJD (DEGENERATIVE JOINT DISEASE), ANKLE AND FOOT, LEFT: Primary | ICD-10-CM

## 2020-09-17 PROCEDURE — 73630 X-RAY EXAM OF FOOT: CPT

## 2020-09-17 PROCEDURE — 99282 EMERGENCY DEPT VISIT SF MDM: CPT

## 2020-09-17 NOTE — ED TRIAGE NOTES
C/o left leg pain worse in the ankle and top of the foot x 4 months; hx of gout; no recent injury; has seen ortho and her kidney doctor with no results; painful to walk and pain is worse at night

## 2020-09-17 NOTE — ED PROVIDER NOTES
EMERGENCY DEPARTMENT HISTORY AND PHYSICAL EXAM      Date: 9/17/2020  Patient Name: Malachi Whitney    History of Presenting Illness     Chief Complaint   Patient presents with    Ankle Pain       History Provided By: Patient    HPI: Malachi Whitney, 79 y.o. female with a past medical history significant diabetes and gout presents to the ED with cc of left foot and ankle pain for several months. She has seen her primary doctor, a podiatrist, and her kidney doctor for this. Her renal physician prescribed a topical cream which she cannot recall the name of. She complains of continued pain in the foot that worsens with any weightbearing. No redness, edema, or rash noted. No calf swelling or tenderness, chest pain or shortness of breath. States she was taking ibuprofen which did help with the pain, but she was taking large amounts and her renal physician told her to stop. PCP: Gaudencio Ceja MD    No current facility-administered medications on file prior to encounter. Current Outpatient Medications on File Prior to Encounter   Medication Sig Dispense Refill    atorvastatin (LIPITOR) 40 mg tablet TAKE 1 TAB BY MOUTH NIGHTLY. STOP 20 MG DOSE 90 Tab 4    glimepiride (AMARYL) 4 mg tablet Take 1 Tab by mouth nightly. 30 Tab 6    exenatide microspheres (Bydureon) 2 mg/0.65 mL pnij  INJECT 1 SYRINGE SUBCUTANEOUSLY ONCE EVERY 7 DAYS 2.6 mL 6    repaglinide (PRANDIN) 2 mg tablet TAKE 1 TABLET BY MOUTH ONCE DAILY BEFORE BREAKFAST, LUNCH, AND DINNER 90 Tab 2    amLODIPine (NORVASC) 10 mg tablet TAKE 1 TABLET BY MOUTH EVERY DAY 90 Tab 4    metFORMIN ER (GLUCOPHAGE XR) 500 mg tablet TAKE 2 TABS BY MOUTH TWO (2) TIMES DAILY (WITH MEALS). STOP GLUMETZA 360 Tab 4    glucose blood VI test strips (CONTOUR NEXT TEST STRIPS) strip USE AS DIRECTED TWICE A DAY.  Dx code E11.65 100 Strip 6    lisinopril (PRINIVIL, ZESTRIL) 10 mg tablet TAKE 1 TABLET BY MOUTH EVERY DAY 90 Tab 4       Past History     Past Medical History:  Past Medical History:   Diagnosis Date    Diabetes mellitus ([facility] [address]     Hypercholesterolemia     Hypertension        Past Surgical History:  Past Surgical History:   Procedure Laterality Date    HX  SECTION      x2       Family History:  Family History   Problem Relation Age of Onset    Diabetes Mother     Diabetes Father     Diabetes Brother     Cancer Brother        Social History:  Social History     Tobacco Use    Smoking status: Never Smoker    Smokeless tobacco: Never Used   Substance Use Topics    Alcohol use: No    Drug use: No       Allergies: Allergies   Allergen Reactions    Ciprofloxacin Rash    Januvia [Sitagliptin] Hives     Stopped for bad rash         Review of Systems   Review of Systems   Constitutional: Negative. HENT: Negative. Eyes: Negative. Respiratory: Negative for chest tightness and shortness of breath. Cardiovascular: Negative for chest pain and leg swelling. Gastrointestinal: Negative. All other systems reviewed and are negative. Physical Exam   Physical Exam  Vitals signs and nursing note reviewed. Constitutional:       Appearance: Normal appearance. HENT:      Head: Normocephalic. Eyes:      Conjunctiva/sclera: Conjunctivae normal.   Musculoskeletal: Normal range of motion. General: No deformity. Left lower leg: No edema. Feet:    Skin:     General: Skin is warm and dry. Capillary Refill: Capillary refill takes less than 2 seconds. Findings: No erythema, lesion or rash. Neurological:      General: No focal deficit present. Mental Status: She is alert. Sensory: No sensory deficit. Motor: No weakness. Psychiatric:         Mood and Affect: Mood normal.         Diagnostic Study Results     Labs -   No results found for this or any previous visit (from the past 12 hour(s)).     Radiologic Studies -   XR FOOT LT MIN 3 V   Final Result   IMPRESSION: Advanced degenerative change left foot, most apparent first   metatarsal phalangeal joint. CT Results  (Last 48 hours)    None        CXR Results  (Last 48 hours)    None            Medical Decision Making   I am the first provider for this patient. I reviewed the vital signs, available nursing notes, past medical history, past surgical history, family history and social history. Vital Signs-Reviewed the patient's vital signs. Patient Vitals for the past 12 hrs:   Temp Pulse Resp BP SpO2   09/17/20 0757 98.5 °F (36.9 °C) 92 18 (!) 146/89 98 %       Records Reviewed: Previous Laboratory Studies BUN/Cr. 19/1.19 in Dec  2019    Provider Notes (Medical Decision Making):   Patient with arthritic changes on x-ray, decent renal function and not aware of any acute changes on recent evaluations. Recommend she restart ibuprofen 200 mg twice a day and continue topical cream that she was prescribed. She is to follow-up with her physician for reevaluation. ED Course:   Initial assessment performed. The patients presenting problems have been discussed, and they are in agreement with the care plan formulated and outlined with them. I have encouraged them to ask questions as they arise throughout their visit. PLAN:  1. Current Discharge Medication List        2. Follow-up Information     Follow up With Specialties Details Why Contact Info    Kenneth Parada MD Family Medicine Schedule an appointment as soon as possible for a visit   Aleisha Palomo [phone]          Return to ED if worse     Diagnosis     Clinical Impression:   1.  DJD (degenerative joint disease), ankle and foot, left

## 2020-11-19 RX ORDER — LISINOPRIL 10 MG/1
TABLET ORAL
Qty: 90 TAB | Refills: 3 | Status: SHIPPED | OUTPATIENT
Start: 2020-11-19 | End: 2021-11-10

## 2020-11-20 ENCOUNTER — TRANSCRIBE ORDER (OUTPATIENT)
Dept: ENDOCRINOLOGY | Age: 70
End: 2020-11-20

## 2020-12-28 DIAGNOSIS — E11.9 DIABETES MELLITUS WITHOUT COMPLICATION (HCC): ICD-10-CM

## 2020-12-28 RX ORDER — EXENATIDE 2 MG/.65ML
INJECTION, SUSPENSION, EXTENDED RELEASE SUBCUTANEOUS
Qty: 2.6 ML | Refills: 6 | Status: SHIPPED | OUTPATIENT
Start: 2020-12-28 | End: 2022-01-27

## 2021-01-04 ENCOUNTER — OFFICE VISIT (OUTPATIENT)
Dept: ENDOCRINOLOGY | Age: 71
End: 2021-01-04
Payer: MEDICARE

## 2021-01-04 VITALS
BODY MASS INDEX: 38.27 KG/M2 | DIASTOLIC BLOOD PRESSURE: 83 MMHG | HEIGHT: 63 IN | OXYGEN SATURATION: 96 % | WEIGHT: 216 LBS | SYSTOLIC BLOOD PRESSURE: 155 MMHG | HEART RATE: 81 BPM | TEMPERATURE: 97.7 F | RESPIRATION RATE: 18 BRPM

## 2021-01-04 DIAGNOSIS — E78.2 MIXED HYPERLIPIDEMIA: ICD-10-CM

## 2021-01-04 DIAGNOSIS — I10 ESSENTIAL HYPERTENSION: ICD-10-CM

## 2021-01-04 DIAGNOSIS — E11.21 TYPE 2 DIABETES WITH NEPHROPATHY (HCC): Primary | ICD-10-CM

## 2021-01-04 DIAGNOSIS — E66.01 OBESITY, MORBID (HCC): ICD-10-CM

## 2021-01-04 DIAGNOSIS — R74.8 ELEVATED ALKALINE PHOSPHATASE LEVEL: ICD-10-CM

## 2021-01-04 DIAGNOSIS — R80.1 PERSISTENT PROTEINURIA: ICD-10-CM

## 2021-01-04 PROCEDURE — G8417 CALC BMI ABV UP PARAM F/U: HCPCS | Performed by: INTERNAL MEDICINE

## 2021-01-04 PROCEDURE — G8427 DOCREV CUR MEDS BY ELIG CLIN: HCPCS | Performed by: INTERNAL MEDICINE

## 2021-01-04 PROCEDURE — G8754 DIAS BP LESS 90: HCPCS | Performed by: INTERNAL MEDICINE

## 2021-01-04 PROCEDURE — 3046F HEMOGLOBIN A1C LEVEL >9.0%: CPT | Performed by: INTERNAL MEDICINE

## 2021-01-04 PROCEDURE — 2022F DILAT RTA XM EVC RTNOPTHY: CPT | Performed by: INTERNAL MEDICINE

## 2021-01-04 PROCEDURE — 1090F PRES/ABSN URINE INCON ASSESS: CPT | Performed by: INTERNAL MEDICINE

## 2021-01-04 PROCEDURE — 99215 OFFICE O/P EST HI 40 MIN: CPT | Performed by: INTERNAL MEDICINE

## 2021-01-04 PROCEDURE — G8510 SCR DEP NEG, NO PLAN REQD: HCPCS | Performed by: INTERNAL MEDICINE

## 2021-01-04 PROCEDURE — G8753 SYS BP > OR = 140: HCPCS | Performed by: INTERNAL MEDICINE

## 2021-01-04 PROCEDURE — 1101F PT FALLS ASSESS-DOCD LE1/YR: CPT | Performed by: INTERNAL MEDICINE

## 2021-01-04 PROCEDURE — 3017F COLORECTAL CA SCREEN DOC REV: CPT | Performed by: INTERNAL MEDICINE

## 2021-01-04 PROCEDURE — G8400 PT W/DXA NO RESULTS DOC: HCPCS | Performed by: INTERNAL MEDICINE

## 2021-01-04 PROCEDURE — G8536 NO DOC ELDER MAL SCRN: HCPCS | Performed by: INTERNAL MEDICINE

## 2021-01-04 RX ORDER — FLUCONAZOLE 150 MG/1
150 TABLET ORAL DAILY
Qty: 1 TAB | Refills: 0 | Status: SHIPPED | OUTPATIENT
Start: 2021-01-04 | End: 2021-01-05

## 2021-01-04 RX ORDER — METFORMIN HYDROCHLORIDE 500 MG/1
1000 TABLET, EXTENDED RELEASE ORAL 2 TIMES DAILY WITH MEALS
Qty: 360 TAB | Refills: 4 | Status: SHIPPED | OUTPATIENT
Start: 2021-01-04 | End: 2021-09-27 | Stop reason: SDUPTHER

## 2021-01-04 NOTE — PROGRESS NOTES
1. Have you been to the ER, urgent care clinic since your last visit. no  Hospitalized since your last visit? No    2. Have you seen or consulted any other health care providers outside of the [address] [place] [address] since your last visit? Include any pap smears or colon screening.  No    Wt Readings from Last 3 Encounters:   01/04/21 216 lb (98 kg)   09/17/20 215 lb (97.5 kg)   09/11/20 215 lb 4 oz (97.6 kg)     Temp Readings from Last 3 Encounters:   01/04/21 97.7 °F (36.5 °C) (Oral)   09/17/20 98.5 °F (36.9 °C)   09/11/20 96.9 °F (36.1 °C)     BP Readings from Last 3 Encounters:   01/04/21 (!) 155/83   09/17/20 (!) 146/89   09/11/20 (!) 162/92     Pulse Readings from Last 3 Encounters:   01/04/21 81   09/17/20 92   09/11/20 87     Lab Results   Component Value Date/Time    Hemoglobin A1c 6.8 (H) 12/28/2020 09:27 AM    Hemoglobin A1c (POC) 8.5 09/02/2020 09:42 AM

## 2021-01-04 NOTE — PROGRESS NOTES
HISTORY OF PRESENT ILLNESS  Bhavik Oleary is a 79 y.o. female. Patient here for f/u after last  visit of Type 2 diabetes mellitus  From 2020     Weight is stable   Got the meter   Changed her habits   Feels a whole lot better       2020     She has saved a lot of money as she was spending on traveling   She says she has lot of money and is eager to get [place]           2020    She is doing well   Compliant with meds   C/o yeast infections from prandin , then she says amaryl   She stopped it a month ago           Old history :   Referred : by self/pcp  Pt of Dr. Joe Antunez   He transferred care as Dr. Joe Antunez left   H/o diabetes for 5  years   Current A1C is 6.9 % from 2013  and symptoms/problems include none   Current diabetic medications include glyburide 5 mg 2 pills a day  and victoza 1.8  Current monitoring regimen: home blood tests - 1 times daily  Home blood sugar records: trend: fluctuating a bit  Any episodes of hypoglycemia? no    Weight trend: increasing steadily  Prior visit with dietician: no  Current diet: \"unhealthy\" diet in general  Current exercise: no regular exercise    Known diabetic complications: none  Cardiovascular risk factors: dyslipidemia, diabetes mellitus, obesity, sedentary life style, hypertension, stress, post-menopausal    Eye exam current (within one year): yes  MI: no     Past Medical History:   Diagnosis Date    Diabetes mellitus ([address]     Hypercholesterolemia     Hypertension      Past Surgical History:   Procedure Laterality Date    HX  SECTION      x2     Current Outpatient Medications   Medication Sig    exenatide microspheres (Bydureon) 2 mg/0.65 mL pnij  INJECT 1 SYRINGE SUBCUTANEOUSLY ONCE EVERY 7 DAYS    lisinopriL (PRINIVIL, ZESTRIL) 10 mg tablet Take one tablet daily    repaglinide (PRANDIN) 2 mg tablet TAKE 1 TABLET BY MOUTH  DAILY BEFORE BREAKFAST, LUNCH AND DINNER    atorvastatin (LIPITOR) 40 mg tablet TAKE 1 TAB BY MOUTH NIGHTLY. STOP 20 MG DOSE    glimepiride (AMARYL) 4 mg tablet Take 1 Tab by mouth nightly.  amLODIPine (NORVASC) 10 mg tablet TAKE 1 TABLET BY MOUTH EVERY DAY    metFORMIN ER (GLUCOPHAGE XR) 500 mg tablet TAKE 2 TABS BY MOUTH TWO (2) TIMES DAILY (WITH MEALS). STOP GLUMETZA    glucose blood VI test strips (CONTOUR NEXT TEST STRIPS) strip USE AS DIRECTED TWICE A DAY. Dx code E11.65     No current facility-administered medications for this visit. Review of Systems   Constitutional: Negative. HENT: Negative. Eyes: Negative for pain and redness. Respiratory: Negative. Cardiovascular: Negative for chest pain, palpitations and leg swelling. Gastrointestinal: Negative. Negative for constipation. Genitourinary: Negative. Musculoskeletal: Negative for myalgias. Skin: Negative. Neurological: Negative. Endo/Heme/Allergies: Negative. Psychiatric/Behavioral: Negative for depression and memory loss. The patient does not have insomnia. Physical Exam   Constitutional: She is oriented to person, place, and time. She appears well-developed and well-nourished. HENT:   Head: Normocephalic. Eyes: Conjunctivae and EOM are normal. Pupils are equal, round, and reactive to light. Neck: Normal range of motion. Neck supple. No JVD present. No tracheal deviation present. No thyromegaly present. Cardiovascular: Normal rate, regular rhythm and normal heart sounds. No murmur heard. Pulmonary/Chest: Breath sounds normal.   Abdominal: Soft. Bowel sounds are normal.   Musculoskeletal: Normal range of motion. Lymphadenopathy:     She has no cervical adenopathy. Neurological: She is alert and oriented to person, place, and time. She has normal reflexes. Skin: Skin is warm. Psychiatric: She has a normal mood and affect.        Lab Results   Component Value Date/Time    Hemoglobin A1c 6.8 (H) 12/28/2020 09:27 AM    Hemoglobin A1c 7.6 (H) 12/06/2019 10:05 AM    Hemoglobin A1c 7.0 (H) 05/28/2019 09:15 AM    Glucose 168 (H) 12/28/2020 09:27 AM    Glucose  05/05/2015 09:18 AM    Microalbumin/Creat ratio (mg/g creat) 95 (H) 12/28/2020 09:27 AM    Microalbumin,urine random 21.70 12/28/2020 09:27 AM    LDL, calculated 106.8 (H) 12/28/2020 09:27 AM    Creatinine 1.11 (H) 12/28/2020 09:27 AM      Lab Results   Component Value Date/Time    Cholesterol, total 209 (H) 12/28/2020 09:27 AM    HDL Cholesterol 79 12/28/2020 09:27 AM    LDL, calculated 106.8 (H) 12/28/2020 09:27 AM    Triglyceride 116 12/28/2020 09:27 AM    CHOL/HDL Ratio 2.6 12/28/2020 09:27 AM     Lab Results   Component Value Date/Time    ALT (SGPT) 68 12/28/2020 09:27 AM    Alk. phosphatase 390 (H) 12/28/2020 09:27 AM    Bilirubin, total 0.4 12/28/2020 09:27 AM     Lab Results   Component Value Date/Time    GFR est AA 59 (L) 12/28/2020 09:27 AM    GFR est non-AA 49 (L) 12/28/2020 09:27 AM    Creatinine 1.11 (H) 12/28/2020 09:27 AM    BUN 15 12/28/2020 09:27 AM    Sodium 140 12/28/2020 09:27 AM    Potassium 4.1 12/28/2020 09:27 AM    Chloride 108 12/28/2020 09:27 AM    CO2 29 12/28/2020 09:27 AM              ASSESSMENT and PLAN      1. Type 2 DM uncontrolled : A1c is  6.8 %    From  Dec 2020     compared to    8.5 %    From sept 2020  by POC      Compared to  7.6 %     From   May 2020   Compared tp      7.6 %     From   Dec 2019     Compared to     7 %     From    May 2019     Compared  To  7.8 %     From   Jan 2019       compared to    7.4 %    FROM OCT 2018    COMPARED   TO 6.9 %     From   June 2018    compared to   7.3 %      From    march 2018     Compared to  7.7 %     From sept 2017    compared to    7.5  %     From  May 2017          Jan 2021  Improved control   She did it all   She is eating early   She is using the meter , checks only fasting -  99 to 180 mg     Could not use [place] - old phone   Stay on  amaryl  4 mg , prandin tid, bydureon, metformin er 1 gm bid     Sept 2020   Lost control   Sitting home - enjoying foods   She does not check home    [employer] use - self pay   Stay on  amaryl  4 mg , prandin tid, bydureon, metformin er 1 gm bid       Jan 2020  : She is doing so well   Stay on  bydureon  Changed from  [address]   because of medicare to metformin ER 1 gm bid with meals  Stopped  glipizide nov 2018   continue on AMARYL 4 MG HS AND USE PRANDIN 2 MG TID DOSING  Reassured her several times thatboth meds cannot bring on yeast infections     Stopped jardiance for increased frequency of urination  Not even low dose invokana she could tolerate   She started  on  januvia 100 mg a day  Aug 2016 , and had to be stopped for RASH  Diet discussed     Patient is advised to check blood sugars 1-2 times daily by rotation method. Explained its benefits and risks  lab results and schedule of future lab studies reviewed with patient    2. Hypoglycemia :  Educated on treating the hypoglycemia. 3. HTN :  better controlled ,  continue on norvasc 10 mg and lisinopril 10 mg   Proteinuria present   Procardia to be d/c d. ..for xtreme dizziness   She f/u with  Dr. Howe Diss        4. Dyslipidemia : LDL better with compliance   lipitor 40 mg hs       5. use of aspirin to prevent MI and TIA's discussed      6. Obesity : Body mass index is 38.26 kg/m². She enjoys food,  And she is on bydureon, but reports not taking it consistently       7.  Elevated alk phos -  In presence of good glycemic control  -  New diagnosis, exacerbated   Denies ETOH  And tylenol use   Will do usg abd - rule out gall stone   Check labs in a month and bnv       > 50 % of time is spent on counseling   Patient voiced understanding her plan of care

## 2021-01-04 NOTE — PATIENT INSTRUCTIONS
SPECIFIC INSTRUCTIONS BELOW  
 
lipitor at   40 mg at night Metformin Er 1 gm twice a day with meals  
 
 amaryl 4 mg at night time   ( print it  - she buys it )   
 
prandin 2 mg   Three  times a day before each meal  
 
Stay on  bydureon b-cise  2 mg a  Week   
 
-------------------------------------------------------- Do not skip meals Do not eat in between meals Reduce carbs- pasta, rice, potatoes, bread Do not drink juices or sodas Donot eat peanut butter and biscuits Do not eat sugar free cookies and cakes Do not eat peaches, oranges, grapes and pine apples  And fruit medleys PAY ATTENTION TO THESE GENERAL INSTRUCTIONS  
 
- HEALTH MAINTENANCE IS NOT UP TO DATE  
- YOUR MED LIST IS NOT UP TO DATE AS SOME CHANGES ARE BEING MADE AFTER THE VISIT - FOLLOW SPECIFIC INSTRUCTIONS ABOVE Results *Normal results will not be notified by a phone call starting January 1 2021 *If you have an upcoming visit, the results will be discussed at the visit *Please sign up for MY CHART if you want access to your lab and test results *Abnormal results which require immediate attention will be notified by phone call *Abnormal results which do not require immediate assistance will be notified in 1-2 weeks Refills    -    have your pharmacy send us a refill request 
Phone calls  -  Allow  24 hrs. for non-urgent calls to be returned Prior authorization - It may take 2-4 weeks to process Forms  -  FMLA, DMV etc., will take up to 2 weeks to process Cancellations - please notify the office 2 days in advance Samples  - will only be dispensed at visits  
 
--------------------------------------------------------------------------------------------

## 2021-01-04 NOTE — LETTER
1/4/2021 Patient: Francisco Javier Dowell YOB: 1950 Date of Visit: 1/4/2021 Cesar Jensen MD 
[place] [id number] [place] [address] [address] [employer] Dear Cesar Jensen MD, Thank you for referring Ms. Francisco Javier Dowell to [address] [place] [address] for evaluation. My notes for this consultation are attached. If you have questions, please do not hesitate to call me. I look forward to following your patient along with you. Sincerely, Kermit Sagastume MD

## 2021-01-14 ENCOUNTER — HOSPITAL ENCOUNTER (OUTPATIENT)
Dept: ULTRASOUND IMAGING | Age: 71
Discharge: HOME | End: 2021-01-14
Attending: INTERNAL MEDICINE
Payer: MEDICARE

## 2021-01-14 DIAGNOSIS — R80.1 PERSISTENT PROTEINURIA: ICD-10-CM

## 2021-01-14 DIAGNOSIS — I10 ESSENTIAL HYPERTENSION: ICD-10-CM

## 2021-01-14 DIAGNOSIS — R74.8 ELEVATED ALKALINE PHOSPHATASE LEVEL: ICD-10-CM

## 2021-01-14 DIAGNOSIS — E11.21 TYPE 2 DIABETES WITH NEPHROPATHY (HCC): ICD-10-CM

## 2021-01-14 DIAGNOSIS — E66.01 OBESITY, MORBID (HCC): ICD-10-CM

## 2021-01-14 DIAGNOSIS — E78.2 MIXED HYPERLIPIDEMIA: ICD-10-CM

## 2021-01-14 PROCEDURE — 76700 US EXAM ABDOM COMPLETE: CPT

## 2021-01-26 ENCOUNTER — DOCUMENTATION ONLY (OUTPATIENT)
Dept: ENDOCRINOLOGY | Age: 71
End: 2021-01-26

## 2021-01-26 ENCOUNTER — OFFICE VISIT (OUTPATIENT)
Dept: ENDOCRINOLOGY | Age: 71
End: 2021-01-26
Payer: MEDICARE

## 2021-01-26 VITALS
WEIGHT: 216.6 LBS | TEMPERATURE: 97.3 F | OXYGEN SATURATION: 98 % | RESPIRATION RATE: 18 BRPM | BODY MASS INDEX: 38.38 KG/M2 | SYSTOLIC BLOOD PRESSURE: 169 MMHG | HEIGHT: 63 IN | DIASTOLIC BLOOD PRESSURE: 86 MMHG | HEART RATE: 85 BPM

## 2021-01-26 DIAGNOSIS — N39.0 URINARY TRACT INFECTION WITHOUT HEMATURIA, SITE UNSPECIFIED: ICD-10-CM

## 2021-01-26 DIAGNOSIS — E11.21 TYPE 2 DIABETES WITH NEPHROPATHY (HCC): Primary | ICD-10-CM

## 2021-01-26 DIAGNOSIS — N76.0 ACUTE VAGINITIS: ICD-10-CM

## 2021-01-26 LAB
APPEARANCE UR: CLEAR
BACTERIA URNS QL MICRO: NEGATIVE /HPF
BILIRUB UR QL: NEGATIVE
COLOR UR: ABNORMAL
EPITH CASTS URNS QL MICRO: ABNORMAL /LPF
GLUCOSE UR STRIP.AUTO-MCNC: NEGATIVE MG/DL
HGB UR QL STRIP: NEGATIVE
HYALINE CASTS URNS QL MICRO: ABNORMAL /LPF (ref 0–5)
KETONES UR QL STRIP.AUTO: NEGATIVE MG/DL
LEUKOCYTE ESTERASE UR QL STRIP.AUTO: NEGATIVE
NITRITE UR QL STRIP.AUTO: NEGATIVE
PH UR STRIP: 5.5 [PH] (ref 5–8)
PROT UR STRIP-MCNC: ABNORMAL MG/DL
RBC #/AREA URNS HPF: ABNORMAL /HPF (ref 0–5)
SP GR UR REFRACTOMETRY: 1.01 (ref 1–1.03)
UR CULT HOLD, URHOLD: NORMAL
UROBILINOGEN UR QL STRIP.AUTO: 0.2 EU/DL (ref 0.2–1)
WBC URNS QL MICRO: ABNORMAL /HPF (ref 0–4)

## 2021-01-26 PROCEDURE — 2022F DILAT RTA XM EVC RTNOPTHY: CPT | Performed by: INTERNAL MEDICINE

## 2021-01-26 PROCEDURE — G8754 DIAS BP LESS 90: HCPCS | Performed by: INTERNAL MEDICINE

## 2021-01-26 PROCEDURE — 1101F PT FALLS ASSESS-DOCD LE1/YR: CPT | Performed by: INTERNAL MEDICINE

## 2021-01-26 PROCEDURE — G8417 CALC BMI ABV UP PARAM F/U: HCPCS | Performed by: INTERNAL MEDICINE

## 2021-01-26 PROCEDURE — 3046F HEMOGLOBIN A1C LEVEL >9.0%: CPT | Performed by: INTERNAL MEDICINE

## 2021-01-26 PROCEDURE — G8427 DOCREV CUR MEDS BY ELIG CLIN: HCPCS | Performed by: INTERNAL MEDICINE

## 2021-01-26 PROCEDURE — 3017F COLORECTAL CA SCREEN DOC REV: CPT | Performed by: INTERNAL MEDICINE

## 2021-01-26 PROCEDURE — G8400 PT W/DXA NO RESULTS DOC: HCPCS | Performed by: INTERNAL MEDICINE

## 2021-01-26 PROCEDURE — 99214 OFFICE O/P EST MOD 30 MIN: CPT | Performed by: INTERNAL MEDICINE

## 2021-01-26 PROCEDURE — G8510 SCR DEP NEG, NO PLAN REQD: HCPCS | Performed by: INTERNAL MEDICINE

## 2021-01-26 PROCEDURE — G8536 NO DOC ELDER MAL SCRN: HCPCS | Performed by: INTERNAL MEDICINE

## 2021-01-26 PROCEDURE — G8753 SYS BP > OR = 140: HCPCS | Performed by: INTERNAL MEDICINE

## 2021-01-26 PROCEDURE — 1090F PRES/ABSN URINE INCON ASSESS: CPT | Performed by: INTERNAL MEDICINE

## 2021-01-26 RX ORDER — SULFAMETHOXAZOLE AND TRIMETHOPRIM 800; 160 MG/1; MG/1
TABLET ORAL
Qty: 6 TAB | Refills: 0 | Status: SHIPPED | OUTPATIENT
Start: 2021-01-26 | End: 2021-03-15 | Stop reason: ALTCHOICE

## 2021-01-26 RX ORDER — TERCONAZOLE 4 MG/G
1 CREAM VAGINAL
Qty: 45 G | Refills: 0 | Status: SHIPPED | OUTPATIENT
Start: 2021-01-26 | End: 2021-03-15 | Stop reason: ALTCHOICE

## 2021-01-26 RX ORDER — FLUCONAZOLE 150 MG/1
150 TABLET ORAL DAILY
Qty: 1 TAB | Refills: 0 | Status: SHIPPED | OUTPATIENT
Start: 2021-01-26 | End: 2021-01-27

## 2021-01-26 NOTE — LETTER
1/26/2021 Patient: Beronica Dewey YOB: 1950 Date of Visit: 1/26/2021 Coleen Clayton MD 
[facility] [id number] [place] [address] [address] [employer] Dear Coleen Clayton MD, Thank you for referring Ms. Halima Jin to [address] [address] for evaluation. My notes for this consultation are attached. If you have questions, please do not hesitate to call me. I look forward to following your patient along with you. Sincerely, Александр Aguilar MD

## 2021-01-26 NOTE — PATIENT INSTRUCTIONS
SPECIFIC INSTRUCTIONS BELOW Gave terazol  Cream 
Diflucan 150 mg a day Take bactrim twice a day PAY ATTENTION TO THESE GENERAL INSTRUCTIONS  
 
- HEALTH MAINTENANCE IS NOT GOING TO BE UP TO DATE ON YOUR AVS- PLEASE IGNORE  
- YOUR MED LIST IS NOT UP TO DATE AS SOME CHANGES ARE BEING MADE AFTER THE VISIT - FOLLOW SPECIFIC INSTRUCTIONS ABOVE Results *Normal results will not be notified by a phone call starting January 1 2021 *If you have an upcoming visit, the results will be discussed at the visit *Please sign up for MY CHART if you want access to your lab and test results *Abnormal results which require immediate attention will be notified by phone call *Abnormal results which do not require immediate assistance will be notified in 1-2 weeks Refills    -    have your pharmacy send us a refill request 
Phone calls  -  Allow  24 hrs. for non-urgent calls to be returned Prior authorization - It may take 2-4 weeks to process Forms  -  FMLA, DMV etc., will take up to 2 weeks to process Cancellations - please notify the office 2 days in advance Samples  - will only be dispensed at visits  
 
--------------------------------------------------------------------------------------------

## 2021-01-26 NOTE — PROGRESS NOTES
HISTORY OF PRESENT ILLNESS  Frank Golden is a 79 y.o. female. Patient here for f/u after last  visit of Type 2 diabetes mellitus  From 2021    Pt has severe vaginal infection  She tried taking diflucan  And saw patient first   She has severe symptoms  -  Miserable   Her pcp visit is not available         2021     Weight is stable   Got the meter   Changed her habits   Feels a whole lot better       2020    She is doing well   Compliant with meds   C/o yeast infections from prandin , then she says amaryl   She stopped it a month ago           Old history :   Referred : by self/pcp  Pt of Dr. Elizabeth Todd   He transferred care as Dr. Elizabeth Todd left   H/o diabetes for 5  years   Current A1C is 6.9 % from 2013  and symptoms/problems include none   Current diabetic medications include glyburide 5 mg 2 pills a day  and victoza 1.8  Current monitoring regimen: home blood tests - 1 times daily  Home blood sugar records: trend: fluctuating a bit  Any episodes of hypoglycemia? no      Past Medical History:   Diagnosis Date    Diabetes mellitus ([facility] [address]     Hypercholesterolemia     Hypertension      Past Surgical History:   Procedure Laterality Date    HX  SECTION      x2     Current Outpatient Medications   Medication Sig    metFORMIN ER (GLUCOPHAGE XR) 500 mg tablet Take 2 Tabs by mouth two (2) times daily (with meals). Stop glumetza    exenatide microspheres (Bydureon) 2 mg/0.65 mL pnij  INJECT 1 SYRINGE SUBCUTANEOUSLY ONCE EVERY 7 DAYS    lisinopriL (PRINIVIL, ZESTRIL) 10 mg tablet Take one tablet daily    repaglinide (PRANDIN) 2 mg tablet TAKE 1 TABLET BY MOUTH  DAILY BEFORE BREAKFAST, LUNCH AND DINNER    atorvastatin (LIPITOR) 40 mg tablet TAKE 1 TAB BY MOUTH NIGHTLY. STOP 20 MG DOSE    glimepiride (AMARYL) 4 mg tablet Take 1 Tab by mouth nightly.     amLODIPine (NORVASC) 10 mg tablet TAKE 1 TABLET BY MOUTH EVERY DAY    glucose blood VI test strips (CONTOUR NEXT TEST STRIPS) strip USE AS DIRECTED TWICE A DAY. Dx code E11.65     No current facility-administered medications for this visit. Review of Systems   Constitutional: Negative. HENT: Negative. Eyes: Negative for pain and redness. Respiratory: Negative. Cardiovascular: Negative for chest pain, palpitations and leg swelling. Gastrointestinal: Negative. Negative for constipation. Genitourinary: she has ulcer symptoms         Physical Exam   Constitutional: She is oriented to person, place, and time. She appears well-developed and well-nourished. HENT:   Head: Normocephalic. Eyes: Conjunctivae and EOM are normal. Pupils are equal, round, and reactive to light. Neck: Normal range of motion. Neck supple. No JVD present. No tracheal deviation present. No thyromegaly present. Cardiovascular: Normal rate, regular rhythm and normal heart sounds. No murmur heard. Pulmonary/Chest: Breath sounds normal.   Abdominal: Soft. Bowel sounds are normal.         Lab Results   Component Value Date/Time    Hemoglobin A1c 6.8 (H) 12/28/2020 09:27 AM    Hemoglobin A1c 7.6 (H) 12/06/2019 10:05 AM    Hemoglobin A1c 7.0 (H) 05/28/2019 09:15 AM    Glucose 168 (H) 12/28/2020 09:27 AM    Glucose  05/05/2015 09:18 AM    Microalbumin/Creat ratio (mg/g creat) 95 (H) 12/28/2020 09:27 AM    Microalbumin,urine random 21.70 12/28/2020 09:27 AM    LDL, calculated 106.8 (H) 12/28/2020 09:27 AM    Creatinine 1.11 (H) 12/28/2020 09:27 AM      Lab Results   Component Value Date/Time    Cholesterol, total 209 (H) 12/28/2020 09:27 AM    HDL Cholesterol 79 12/28/2020 09:27 AM    LDL, calculated 106.8 (H) 12/28/2020 09:27 AM    Triglyceride 116 12/28/2020 09:27 AM    CHOL/HDL Ratio 2.6 12/28/2020 09:27 AM     Lab Results   Component Value Date/Time    ALT (SGPT) 68 12/28/2020 09:27 AM    Alk.  phosphatase 390 (H) 12/28/2020 09:27 AM    Bilirubin, total 0.4 12/28/2020 09:27 AM     Lab Results   Component Value Date/Time    GFR est AA 59 (L) 12/28/2020 09:27 AM    GFR est non-AA 49 (L) 12/28/2020 09:27 AM    Creatinine 1.11 (H) 12/28/2020 09:27 AM    BUN 15 12/28/2020 09:27 AM    Sodium 140 12/28/2020 09:27 AM    Potassium 4.1 12/28/2020 09:27 AM    Chloride 108 12/28/2020 09:27 AM    CO2 29 12/28/2020 09:27 AM              ASSESSMENT and PLAN      1. Acute vaginitis :  I am giving diflucan one pill along with local miconazole locally . Will  Do urine analysis . Gave her bactrim   Advising her to see Gyn and consider starting on estrogen cream   Emphasized on increased water and blood sugar stability       2. Type 2 DM uncontrolled : A1c is  6.8 %    From  Dec 2020     compared to    8.5 %    From sept 2020  by POC      Compared to  7.6 %     From   May 2020   Compared tp      7.6 %     From   Dec 2019     Compared to     7 %     From    May 2019     Compared  To  7.8 %     From   Jan 2019       compared to    7.4 %    FROM OCT 2018    COMPARED   TO 6.9 %     From   June 2018    compared to   7.3 %      From    march 2018     Compared to  7.7 %     From sept 2017    compared to    7.5  %     From  May 2017            Jan 2021     Improved control   She did it all   She is eating early   She is using the meter , checks only fasting -  99 to 180 mg     Could not use [place] - old phone   Stay on  amaryl  4 mg , prandin tid, bydureon, metformin er 1 gm bid     Sept 2020   Lost control   Sitting home - enjoying foods   She does not check home    Cook use - self pay   Stay on  amaryl  4 mg , prandin tid, bydureon, metformin er 1 gm bid       Jan 2020  :      She is doing so well   Stay on  bydureon  Changed from  [address]   because of medicare to metformin ER 1 gm bid with meals  Stopped  glipizide nov 2018   continue on AMARYL 4 MG HS AND USE PRANDIN 2 MG TID DOSING  Reassured her several times thatboth meds cannot bring on yeast infections   Stopped jardiance for increased frequency of urination  Not even low dose invokana she could tolerate   She started on  januvia 100 mg a day  Aug 2016 , and had to be stopped for RASH  Diet discussed     Patient is advised to check blood sugars 1-2 times daily by rotation method. Explained its benefits and risks  lab results and schedule of future lab studies reviewed with patient    2. Hypoglycemia :  Educated on treating the hypoglycemia.        3. Elevated alk phos -  In presence of good glycemic control  -  New diagnosis,   Discussed  usg abd - normal     She will keep her f/u appt as is       > 50 % of time is spent on counseling   Patient voiced understanding her plan of care

## 2021-01-26 NOTE — PROGRESS NOTES
1. Have you been to the ER, urgent care clinic since your last visit? Urgent Care/January 2020/Itching and burning in vagina area Hospitalized since your last visit? No    2. Have you seen or consulted any other health care providers outside of the [address] [place] [address] since your last visit? Include any pap smears or colon screening. No    Wt Readings from Last 3 Encounters:   01/26/21 216 lb 9.6 oz (98.2 kg)   01/04/21 216 lb (98 kg)   09/17/20 215 lb (97.5 kg)     Temp Readings from Last 3 Encounters:   01/26/21 97.3 °F (36.3 °C) (Oral)   01/04/21 97.7 °F (36.5 °C) (Oral)   09/17/20 98.5 °F (36.9 °C)     BP Readings from Last 3 Encounters:   01/26/21 (!) 169/86   01/04/21 (!) 155/83   09/17/20 (!) 146/89     Pulse Readings from Last 3 Encounters:   01/26/21 85   01/04/21 81   09/17/20 92     Lab Results   Component Value Date/Time    Hemoglobin A1c 6.8 (H) 12/28/2020 09:27 AM    Hemoglobin A1c (POC) 8.5 09/02/2020 09:42 AM     Patient does not have meter today.

## 2021-03-15 ENCOUNTER — OFFICE VISIT (OUTPATIENT)
Dept: ENDOCRINOLOGY | Age: 71
End: 2021-03-15
Payer: MEDICARE

## 2021-03-15 VITALS
WEIGHT: 216 LBS | DIASTOLIC BLOOD PRESSURE: 80 MMHG | HEART RATE: 93 BPM | OXYGEN SATURATION: 98 % | SYSTOLIC BLOOD PRESSURE: 157 MMHG | RESPIRATION RATE: 18 BRPM | BODY MASS INDEX: 38.26 KG/M2 | TEMPERATURE: 98.1 F

## 2021-03-15 DIAGNOSIS — E11.21 TYPE 2 DIABETES WITH NEPHROPATHY (HCC): Primary | ICD-10-CM

## 2021-03-15 DIAGNOSIS — E66.01 OBESITY, MORBID (HCC): ICD-10-CM

## 2021-03-15 DIAGNOSIS — R74.8 ELEVATED ALKALINE PHOSPHATASE LEVEL: ICD-10-CM

## 2021-03-15 DIAGNOSIS — E78.2 MIXED HYPERLIPIDEMIA: ICD-10-CM

## 2021-03-15 DIAGNOSIS — I10 ESSENTIAL HYPERTENSION: ICD-10-CM

## 2021-03-15 LAB — HBA1C MFR BLD HPLC: 6.8 %

## 2021-03-15 PROCEDURE — G8536 NO DOC ELDER MAL SCRN: HCPCS | Performed by: INTERNAL MEDICINE

## 2021-03-15 PROCEDURE — 3017F COLORECTAL CA SCREEN DOC REV: CPT | Performed by: INTERNAL MEDICINE

## 2021-03-15 PROCEDURE — 3044F HG A1C LEVEL LT 7.0%: CPT | Performed by: INTERNAL MEDICINE

## 2021-03-15 PROCEDURE — G8417 CALC BMI ABV UP PARAM F/U: HCPCS | Performed by: INTERNAL MEDICINE

## 2021-03-15 PROCEDURE — 1101F PT FALLS ASSESS-DOCD LE1/YR: CPT | Performed by: INTERNAL MEDICINE

## 2021-03-15 PROCEDURE — G8754 DIAS BP LESS 90: HCPCS | Performed by: INTERNAL MEDICINE

## 2021-03-15 PROCEDURE — G8427 DOCREV CUR MEDS BY ELIG CLIN: HCPCS | Performed by: INTERNAL MEDICINE

## 2021-03-15 PROCEDURE — 83036 HEMOGLOBIN GLYCOSYLATED A1C: CPT | Performed by: INTERNAL MEDICINE

## 2021-03-15 PROCEDURE — G8753 SYS BP > OR = 140: HCPCS | Performed by: INTERNAL MEDICINE

## 2021-03-15 PROCEDURE — G8400 PT W/DXA NO RESULTS DOC: HCPCS | Performed by: INTERNAL MEDICINE

## 2021-03-15 PROCEDURE — G8510 SCR DEP NEG, NO PLAN REQD: HCPCS | Performed by: INTERNAL MEDICINE

## 2021-03-15 PROCEDURE — 99214 OFFICE O/P EST MOD 30 MIN: CPT | Performed by: INTERNAL MEDICINE

## 2021-03-15 PROCEDURE — 2022F DILAT RTA XM EVC RTNOPTHY: CPT | Performed by: INTERNAL MEDICINE

## 2021-03-15 PROCEDURE — 1090F PRES/ABSN URINE INCON ASSESS: CPT | Performed by: INTERNAL MEDICINE

## 2021-03-15 NOTE — PROGRESS NOTES
1. Have you been to the ER, urgent care clinic since your last visit?no  Hospitalized since your last visit? No    2. Have you seen or consulted any other health care providers outside of the [address] [place] [address] since your last visit? Include any pap smears or colon screening.  No    Wt Readings from Last 3 Encounters:   03/15/21 216 lb (98 kg)   01/26/21 216 lb 9.6 oz (98.2 kg)   01/04/21 216 lb (98 kg)     Temp Readings from Last 3 Encounters:   03/15/21 98.1 °F (36.7 °C)   01/26/21 97.3 °F (36.3 °C) (Oral)   01/04/21 97.7 °F (36.5 °C) (Oral)     BP Readings from Last 3 Encounters:   03/15/21 (!) 157/80   01/26/21 (!) 169/86   01/04/21 (!) 155/83     Pulse Readings from Last 3 Encounters:   03/15/21 93   01/26/21 85   01/04/21 81     Lab Results   Component Value Date/Time    Hemoglobin A1c 6.8 (H) 12/28/2020 09:27 AM    Hemoglobin A1c (POC) 8.5 09/02/2020 09:42 AM

## 2021-03-15 NOTE — PROGRESS NOTES
HISTORY OF PRESENT ILLNESS  Ale Noble is a 79 y.o. female. Patient here for f/u after last  visit of Type 2 diabetes mellitus  From jan 4 2021    She still has mild suffering  Still on right groin  She has changed some bath soaps     She is doing very well by log         Jan 26 2021     Pt has severe vaginal infection  She tried taking diflucan  And saw patient first   She has severe symptoms  -  Miserable   Her pcp visit is not available       Jan 2020    She is doing well   Compliant with meds   C/o yeast infections from prandin , then she says amaryl   She stopped it a month ago           Old history :   Referred : by self/pcp  Pt of Dr. Richard Reid   He transferred care as Dr. Richard Reid left   H/o diabetes for 5  years   Current A1C is 6.9 % from sept 2013  and symptoms/problems include none   Current diabetic medications include glyburide 5 mg 2 pills a day  and victoza 1.8  Current monitoring regimen: home blood tests - 1 times daily  Home blood sugar records: trend: fluctuating a bit  Any episodes of hypoglycemia? no        Review of Systems   Constitutional: Negative. Genitourinary: she has less symptoms          Physical Exam   Constitutional: She is oriented to person, place, and time. She appears well-developed and well-nourished. Abdominal: Soft.  Bowel sounds are normal.         Lab Results   Component Value Date/Time    Hemoglobin A1c 6.8 (H) 12/28/2020 09:27 AM    Hemoglobin A1c 7.6 (H) 12/06/2019 10:05 AM    Hemoglobin A1c 7.0 (H) 05/28/2019 09:15 AM    Glucose 108 (H) 03/08/2021 08:59 AM    Glucose  05/05/2015 09:18 AM    Microalbumin/Creat ratio (mg/g creat) 67 (H) 03/08/2021 08:59 AM    Microalbumin,urine random 7.09 03/08/2021 08:59 AM    LDL, calculated 91 03/08/2021 08:59 AM    Creatinine 1.21 (H) 03/08/2021 08:59 AM      Lab Results   Component Value Date/Time    Cholesterol, total 169 03/08/2021 08:59 AM    HDL Cholesterol 63 03/08/2021 08:59 AM    LDL, calculated 91 03/08/2021 08:59 AM    Triglyceride 75 03/08/2021 08:59 AM    CHOL/HDL Ratio 2.7 03/08/2021 08:59 AM     Lab Results   Component Value Date/Time    ALT (SGPT) 48 03/08/2021 08:59 AM    Alk. phosphatase 322 (H) 03/08/2021 08:59 AM    Bilirubin, total 0.4 03/08/2021 08:59 AM     Lab Results   Component Value Date/Time    GFR est AA 53 (L) 03/08/2021 08:59 AM    GFR est non-AA 44 (L) 03/08/2021 08:59 AM    Creatinine 1.21 (H) 03/08/2021 08:59 AM    BUN 21 (H) 03/08/2021 08:59 AM    Sodium 139 03/08/2021 08:59 AM    Potassium 4.0 03/08/2021 08:59 AM    Chloride 109 (H) 03/08/2021 08:59 AM    CO2 26 03/08/2021 08:59 AM              ASSESSMENT and PLAN    1. Type 2 DM uncontrolled : A1c is   6.8   %   From today march 2021   Compared to  6.8 %    From  Dec 2020     compared to    8.5 %    From sept 2020  by POC      Compared to  7.6 %     From   May 2020   Compared tp      7.6 %     From   Dec 2019     Compared to     7 %     From    May 2019     Compared  To  7.8 %     From   Jan 2019       compared to    7.4 %    FROM OCT 2018    COMPARED   TO 6.9 %     From   June 2018    compared to   7.3 %      From    march 2018     Compared to  7.7 %     From sept 2017    compared to    7.5  %     From  May 2017          March 2021   Reviewed log and she is doing well, commended her   She has not attended any banquets since covid started -   Stay on  amaryl  4 mg , prandin tid, bydureon, metformin er 1 gm bid       Jan 2021     Improved control   She did it all   She is eating early   She is using the meter , checks only fasting -  99 to 180 mg   Could not use [place] - old phone   Stay on  amaryl  4 mg , prandin tid, bydureon, metformin er 1 gm bid         Sept 2020   Lost control   Sitting home - enjoying foods   She does not check home    Cook use - self pay   Stay on  amaryl  4 mg , prandin tid, bydureon, metformin er 1 gm bid       Jan 2020  :      She is doing so well   Stay on  bydureon  Changed from  [address]   because of medicare to metformin ER 1 gm bid with meals  Stopped  glipizide nov 2018   continue on AMARYL 4 MG HS AND USE PRANDIN 2 MG TID DOSING  Reassured her several times thatboth meds cannot bring on yeast infections   Stopped jardiance for increased frequency of urination  Not even low dose invokana she could tolerate   She started  on  januvia 100 mg a day  Aug 2016 , and had to be stopped for RASH  Diet discussed       2. Hypoglycemia :  Educated on treating the hypoglycemia.        3. Elevated alk phos -  In presence of good glycemic control  -  New diagnosis,   Discussed  usg abd - normal   Ordered bone iso-enzymes     Reviewed results with patient and discussed the labs being ordered today/bnv  Patient voiced understanding of plan of care

## 2021-03-15 NOTE — PATIENT INSTRUCTIONS
SPECIFIC INSTRUCTIONS BELOW      lipitor at   40 mg at night      Metformin Er 1 gm twice a day with meals       amaryl 4 mg at night time   ( print it  - she buys it )       prandin 2 mg   Three  times a day before each meal      Stay on  bydureon b-cise  2 mg a  Week           PAY ATTENTION TO THESE GENERAL INSTRUCTIONS     - HEALTH MAINTENANCE IS NOT GOING TO BE UP TO DATE ON YOUR AVS- PLEASE IGNORE   - YOUR MED LIST IS NOT UP TO DATE AS SOME CHANGES ARE BEING MADE AFTER THE VISIT - FOLLOW SPECIFIC INSTRUCTIONS ABOVE     Results     *Normal results will not be notified by a phone call starting January 1 2021   *If you have an upcoming visit, the results will be discussed at the visit   *Please sign up for MY CHART if you want access to your lab and test results  *Abnormal results which require immediate attention will be notified by phone call   *Abnormal results which do not require immediate assistance will be notified in 1-2 weeks       Refills    -    have your pharmacy send us a refill request  Phone calls  -  Allow  24 hrs.  for non-urgent calls to be returned  Prior authorization - It may take 2-4 weeks to process  Forms  -  FMLA, DMV etc., will take up to 2 weeks to process  Cancellations - please notify the office 2 days in advance   Samples  - will only be dispensed at visits     -

## 2021-03-15 NOTE — LETTER
3/15/2021 Patient: Matt Matthews YOB: 1950 Date of Visit: 3/15/2021 Tino Ribeiro MD 
[facility] [address] [id number] [place] [address] [address] [employer] Dear Tino Ribeiro MD, Thank you for referring Ms. Darlean Romberg to [address] [address] for evaluation. My notes for this consultation are attached. If you have questions, please do not hesitate to call me. I look forward to following your patient along with you. Sincerely, Carol Ann Fields MD

## 2021-04-28 RX ORDER — EXENATIDE 2 MG/.85ML
INJECTION, SUSPENSION, EXTENDED RELEASE SUBCUTANEOUS
Qty: 3.4 ML | Refills: 6 | Status: SHIPPED | OUTPATIENT
Start: 2021-04-28 | End: 2021-09-27 | Stop reason: SDUPTHER

## 2021-05-11 RX ORDER — GLIMEPIRIDE 4 MG/1
TABLET ORAL
Qty: 90 TAB | Refills: 2 | Status: SHIPPED | OUTPATIENT
Start: 2021-05-11 | End: 2022-02-15

## 2021-08-19 DIAGNOSIS — I10 ESSENTIAL HYPERTENSION, BENIGN: ICD-10-CM

## 2021-08-19 RX ORDER — AMLODIPINE BESYLATE 10 MG/1
TABLET ORAL
Qty: 90 TABLET | Refills: 4 | Status: SHIPPED | OUTPATIENT
Start: 2021-08-19 | End: 2022-07-27 | Stop reason: SDUPTHER

## 2021-09-13 ENCOUNTER — TELEPHONE (OUTPATIENT)
Dept: ENDOCRINOLOGY | Age: 71
End: 2021-09-13

## 2021-09-13 NOTE — TELEPHONE ENCOUNTER
Patient neds refill on Terconazole cream for yeast infection. Also wants Hydrocortisone cream for neck she has break out.

## 2021-09-15 RX ORDER — FLUCONAZOLE 150 MG/1
150 TABLET ORAL DAILY
Qty: 1 TABLET | Refills: 0 | Status: SHIPPED | OUTPATIENT
Start: 2021-09-15 | End: 2021-09-16

## 2021-09-22 LAB
ALBUMIN SERPL-MCNC: 4.5 G/DL (ref 3.7–4.7)
ALBUMIN/CREAT UR: 52 MG/G CREAT (ref 0–29)
ALBUMIN/GLOB SERPL: 1.5 {RATIO} (ref 1.2–2.2)
ALP BONE SERPL-MCNC: 26.1 UG/L
ALP SERPL-CCNC: 227 IU/L (ref 44–121)
ALT SERPL-CCNC: 35 IU/L (ref 0–32)
AST SERPL-CCNC: 26 IU/L (ref 0–40)
BILIRUB SERPL-MCNC: 0.3 MG/DL (ref 0–1.2)
BUN SERPL-MCNC: 30 MG/DL (ref 8–27)
BUN/CREAT SERPL: 23 (ref 12–28)
CALCIUM SERPL-MCNC: 9.8 MG/DL (ref 8.7–10.3)
CHLORIDE SERPL-SCNC: 108 MMOL/L (ref 96–106)
CHOLEST SERPL-MCNC: 159 MG/DL (ref 100–199)
CO2 SERPL-SCNC: 21 MMOL/L (ref 20–29)
CREAT SERPL-MCNC: 1.28 MG/DL (ref 0.57–1)
CREAT UR-MCNC: 116.9 MG/DL
EST. AVERAGE GLUCOSE BLD GHB EST-MCNC: 180 MG/DL
GLOBULIN SER CALC-MCNC: 3.1 G/DL (ref 1.5–4.5)
GLUCOSE SERPL-MCNC: 179 MG/DL (ref 65–99)
HBA1C MFR BLD: 7.9 % (ref 4.8–5.6)
HDLC SERPL-MCNC: 51 MG/DL
IMP & REVIEW OF LAB RESULTS: NORMAL
INTERPRETATION: NORMAL
LDLC SERPL CALC-MCNC: 93 MG/DL (ref 0–99)
MICROALBUMIN UR-MCNC: 60.6 UG/ML
POTASSIUM SERPL-SCNC: 5.2 MMOL/L (ref 3.5–5.2)
PROT SERPL-MCNC: 7.6 G/DL (ref 6–8.5)
PTH-INTACT SERPL-MCNC: 33 PG/ML (ref 15–65)
SODIUM SERPL-SCNC: 144 MMOL/L (ref 134–144)
TRIGL SERPL-MCNC: 79 MG/DL (ref 0–149)
VLDLC SERPL CALC-MCNC: 15 MG/DL (ref 5–40)

## 2021-09-27 ENCOUNTER — OFFICE VISIT (OUTPATIENT)
Dept: ENDOCRINOLOGY | Age: 71
End: 2021-09-27
Payer: MEDICARE

## 2021-09-27 VITALS
WEIGHT: 222 LBS | SYSTOLIC BLOOD PRESSURE: 131 MMHG | TEMPERATURE: 98.1 F | BODY MASS INDEX: 39.34 KG/M2 | HEART RATE: 78 BPM | OXYGEN SATURATION: 100 % | RESPIRATION RATE: 18 BRPM | DIASTOLIC BLOOD PRESSURE: 70 MMHG | HEIGHT: 63 IN

## 2021-09-27 DIAGNOSIS — E66.01 OBESITY, MORBID (HCC): ICD-10-CM

## 2021-09-27 DIAGNOSIS — E11.21 TYPE 2 DIABETES WITH NEPHROPATHY (HCC): Primary | ICD-10-CM

## 2021-09-27 DIAGNOSIS — E78.2 MIXED HYPERLIPIDEMIA: ICD-10-CM

## 2021-09-27 DIAGNOSIS — I10 ESSENTIAL HYPERTENSION, BENIGN: ICD-10-CM

## 2021-09-27 DIAGNOSIS — I10 ESSENTIAL HYPERTENSION: ICD-10-CM

## 2021-09-27 DIAGNOSIS — R80.1 PERSISTENT PROTEINURIA: ICD-10-CM

## 2021-09-27 PROCEDURE — G8536 NO DOC ELDER MAL SCRN: HCPCS | Performed by: INTERNAL MEDICINE

## 2021-09-27 PROCEDURE — 2022F DILAT RTA XM EVC RTNOPTHY: CPT | Performed by: INTERNAL MEDICINE

## 2021-09-27 PROCEDURE — G8754 DIAS BP LESS 90: HCPCS | Performed by: INTERNAL MEDICINE

## 2021-09-27 PROCEDURE — G8417 CALC BMI ABV UP PARAM F/U: HCPCS | Performed by: INTERNAL MEDICINE

## 2021-09-27 PROCEDURE — G8752 SYS BP LESS 140: HCPCS | Performed by: INTERNAL MEDICINE

## 2021-09-27 PROCEDURE — 99214 OFFICE O/P EST MOD 30 MIN: CPT | Performed by: INTERNAL MEDICINE

## 2021-09-27 PROCEDURE — G8510 SCR DEP NEG, NO PLAN REQD: HCPCS | Performed by: INTERNAL MEDICINE

## 2021-09-27 PROCEDURE — G8427 DOCREV CUR MEDS BY ELIG CLIN: HCPCS | Performed by: INTERNAL MEDICINE

## 2021-09-27 PROCEDURE — G8400 PT W/DXA NO RESULTS DOC: HCPCS | Performed by: INTERNAL MEDICINE

## 2021-09-27 PROCEDURE — 3017F COLORECTAL CA SCREEN DOC REV: CPT | Performed by: INTERNAL MEDICINE

## 2021-09-27 PROCEDURE — 1101F PT FALLS ASSESS-DOCD LE1/YR: CPT | Performed by: INTERNAL MEDICINE

## 2021-09-27 PROCEDURE — 1090F PRES/ABSN URINE INCON ASSESS: CPT | Performed by: INTERNAL MEDICINE

## 2021-09-27 PROCEDURE — 3051F HG A1C>EQUAL 7.0%<8.0%: CPT | Performed by: INTERNAL MEDICINE

## 2021-09-27 RX ORDER — EXENATIDE 2 MG/.85ML
INJECTION, SUSPENSION, EXTENDED RELEASE SUBCUTANEOUS
Qty: 3.4 ML | Refills: 6 | Status: SHIPPED | OUTPATIENT
Start: 2021-09-27 | End: 2022-07-27 | Stop reason: SDUPTHER

## 2021-09-27 RX ORDER — HYDROCORTISONE 25 MG/G
CREAM TOPICAL 2 TIMES DAILY
Qty: 30 G | Refills: 4 | Status: SHIPPED | OUTPATIENT
Start: 2021-09-27 | End: 2022-10-31

## 2021-09-27 RX ORDER — METFORMIN HYDROCHLORIDE 500 MG/1
1000 TABLET, EXTENDED RELEASE ORAL 2 TIMES DAILY WITH MEALS
Qty: 360 TABLET | Refills: 3 | Status: SHIPPED | OUTPATIENT
Start: 2021-09-27 | End: 2022-03-15

## 2021-09-27 NOTE — PATIENT INSTRUCTIONS
SPECIFIC INSTRUCTIONS BELOW         DRINK water   Do not skip meals  Do not eat in between meals    Reduce carbs- pasta, rice, potatoes, bread   Try to avoid processed bread products like BISCUITS, CROISSANTS, MUFFINS    Do not drink juices or sodas, even if they are calorie zero or diet drinks and especially avoid using powders like crystalloids , MARYANN-AIDS     Do not eat peanut butter     Do not eat sugar free cookies and cakes   Do not eat peaches, oranges, pineapples, raisins, grapes , canteloupe , honey dew, mangoes , watermelon  and fruit medleys'       lipitor at   40 mg at night      Metformin Er 1 gm twice a day with meals       amaryl 4 mg at night time   ( print it  - she buys it )       prandin 2 mg   Three  times a day before each meal      Stay on  bydureon b-cise  2 mg a  Week           -------------PAY ATTENTION TO THESE GENERAL INSTRUCTIONS -----------------      - The medications prescribed at this visit will not be available at pharmacy until 6 pm       - YOUR MED LIST IS NOT UP TO DATE AS SOME CHANGES ARE BEING MADE AFTER THE VISIT - FOLLOW SPECIFIC INSTRUCTIONS  ABOVE     -ANY tests other than blood work, which you opt to do  outside the  [facility] imaging facilities, you are responsible for prior authorizations if  required    - [address] UP TO DATE ON YOUR AVS- PLEASE IGNORE     Results     *Normal results will not be notified by a phone call starting January 1 2021   *If you have an upcoming visit, the results will be discussed at the visit   *Please sign up for MY CHART if you want access to your lab and test results  *Abnormal results which require immediate attention will be notified by phone call   *Abnormal results which do not require immediate assistance will be notified in 1-2 weeks       Refills    -    have your pharmacy send us a refill request . Refills are done max for one year and a visit is a must before refills are extended    Follow up appointments -  highly encourage you to make it when you are checking out. We can accommodate you into the schedule based on your clinical situation, but not for extending refills beyond a year. Labs are important to give refills and is important to get labs before the visit     Phone calls  -  Allow  24 hrs.  for non-urgent calls to be returned  Prior authorization - It may take 2-4 weeks to process  Forms  -  FMLA, DMV etc., will take up to 2 weeks to process  Cancellations - please notify the office 2 days in advance   Samples  - will only be dispensed at visits       If not showing for the appointments and cancelling appointments within 24 hours are kept track of and three  of such situations in  two consecutive years will likely be considered for termination from the practice    -------------------------------------------------------------------------------------------------------------------

## 2021-09-27 NOTE — PROGRESS NOTES
1. Have you been to the ER, urgent care clinic since your last visit? No  Hospitalized since your last visit? No    2. Have you seen or consulted any other health care providers outside of the [address] [place] [address] since your last visit? Include any pap smears or colon screening.  No

## 2021-09-27 NOTE — PROGRESS NOTES
HISTORY OF PRESENT ILLNESS  Radha Rodriguez is a 70 y.o. female. Patient here for f/u after last  visit of Type 2 diabetes mellitus  From March 2021      She is suffering  with rash on and off       March 2021     She still has mild suffering  Still on right groin  She has changed some bath soaps   She is doing very well by log         Jan 26 2021     Pt has severe vaginal infection  She tried taking diflucan  And saw patient first   She has severe symptoms  -  Miserable   Her pcp visit is not available         Old history :   Referred : by self/pcp  Pt of Dr. Jenni Rivera   He transferred care as Dr. Jenni Rivera left   H/o diabetes for 5  years   Current A1C is 6.9 % from sept 2013  and symptoms/problems include none   Current diabetic medications include glyburide 5 mg 2 pills a day  and victoza 1.8      Review of Systems   Constitutional: Negative. Skin - has rash       Physical Exam   Constitutional: She is oriented to person, place, and time. She appears well-developed and well-nourished. Abdominal: Soft.  Bowel sounds are normal.         Lab Results   Component Value Date/Time    Hemoglobin A1c 7.9 (H) 09/20/2021 12:00 AM    Hemoglobin A1c 6.8 (H) 12/28/2020 09:27 AM    Hemoglobin A1c 7.6 (H) 12/06/2019 10:05 AM    Glucose 179 (H) 09/20/2021 12:00 AM    Glucose  05/05/2015 09:18 AM    Microalbumin/Creat ratio (mg/g creat) 67 (H) 03/08/2021 08:59 AM    Microalb/Creat ratio (ug/mg creat.) 52 (H) 09/20/2021 12:00 AM    Microalbumin,urine random 7.09 03/08/2021 08:59 AM    LDL, calculated 93 09/20/2021 12:00 AM    LDL, calculated 91 03/08/2021 08:59 AM    Creatinine 1.28 (H) 09/20/2021 12:00 AM      Lab Results   Component Value Date/Time    Cholesterol, total 159 09/20/2021 12:00 AM    HDL Cholesterol 51 09/20/2021 12:00 AM    LDL, calculated 93 09/20/2021 12:00 AM    LDL, calculated 91 03/08/2021 08:59 AM    Triglyceride 79 09/20/2021 12:00 AM    CHOL/HDL Ratio 2.7 03/08/2021 08:59 AM     Lab Results Component Value Date/Time    ALT (SGPT) 35 (H) 09/20/2021 12:00 AM    Alk. phosphatase 227 (H) 09/20/2021 12:00 AM    Bilirubin, total 0.3 09/20/2021 12:00 AM     Lab Results   Component Value Date/Time    GFR est AA 49 (L) 09/20/2021 12:00 AM    GFR est non-AA 42 (L) 09/20/2021 12:00 AM    Creatinine 1.28 (H) 09/20/2021 12:00 AM    BUN 30 (H) 09/20/2021 12:00 AM    Sodium 144 09/20/2021 12:00 AM    Potassium 5.2 09/20/2021 12:00 AM    Chloride 108 (H) 09/20/2021 12:00 AM    CO2 21 09/20/2021 12:00 AM              ASSESSMENT and PLAN    1. Type 2 DM uncontrolled : A1c is  7.9 %     From sept 2021   Compared to    6.8   %   From today march 2021   Compared to  6.8 %    From  Dec 2020     compared to    8.5 %    From sept 2020  by POC      Compared to  7.6 %     From   May 2020   Compared tp      7.6 %     From   Dec 2019     Compared to     7 %     From    May 2019     Compared  To  7.8 %     From   Jan 2019       compared to    7.4 %    FROM OCT 2018    COMPARED   TO 6.9 %     From   June 2018    compared to   7.3 %      From    march 2018     Compared to  7.7 %     From sept 2017    compared to    7.5  %     From  May 2017          Sept 2021    Losing control   Her sugars are higher from not eating right   Counseled her to eat right   Stay on  amaryl  4 mg , prandin tid, bydureon, metformin er 1 gm bid       March 2021   Reviewed log and she is doing well, commended her   She has not attended any banquets since covid started -   Stay on  amaryl  4 mg , prandin tid, bydureon, metformin er 1 gm bid       Jan 2021     Improved control   She did it all   She is eating early   She is using the meter , checks only fasting -  99 to 180 mg   Could not use [employer] - old phone   Stay on  amaryl  4 mg , prandin tid, bydureon, metformin er 1 gm bid         Sept 2020   Lost control   Sitting home - enjoying foods   She does not check home    Cook use - self pay   Stay on  amaryl  4 mg , prandin tid, bydureon, metformin er 1 gm bid        low dose invokana she could tolerate   She started  on  januvia 100 mg a day  Aug 2016 , and had to be stopped for RASH        2. Hypoglycemia :  Educated on treating the hypoglycemia. 3. Elevated alk phos -  In presence of good glycemic control  -  New diagnosis,   Discussed  usg abd - normal   normal  bone iso-enzymes         4.  Skin rash - she still is suffering from skin rash  - for  6 months,   She tried all remedies, unable to get any derm consult   Wrote for 0.25 % hydrocortisone cream       Reviewed results with patient and discussed the labs being ordered today/bnv  Patient voiced understanding of plan of care

## 2021-11-03 DIAGNOSIS — E78.2 MIXED HYPERLIPIDEMIA: ICD-10-CM

## 2021-11-03 DIAGNOSIS — E11.65 TYPE 2 DIABETES MELLITUS WITH HYPERGLYCEMIA, WITHOUT LONG-TERM CURRENT USE OF INSULIN (HCC): ICD-10-CM

## 2021-11-03 RX ORDER — ATORVASTATIN CALCIUM 40 MG/1
TABLET, FILM COATED ORAL
Qty: 90 TABLET | Refills: 4 | Status: SHIPPED | OUTPATIENT
Start: 2021-11-03

## 2021-11-10 RX ORDER — LISINOPRIL 10 MG/1
TABLET ORAL
Qty: 90 TABLET | Refills: 3 | Status: SHIPPED | OUTPATIENT
Start: 2021-11-10

## 2022-01-20 LAB
ALBUMIN SERPL-MCNC: 4.6 G/DL (ref 3.7–4.7)
ALBUMIN/CREAT UR: 72 MG/G CREAT (ref 0–29)
ALBUMIN/GLOB SERPL: 1.4 {RATIO} (ref 1.2–2.2)
ALP SERPL-CCNC: 219 IU/L (ref 44–121)
ALT SERPL-CCNC: 22 IU/L (ref 0–32)
AST SERPL-CCNC: 18 IU/L (ref 0–40)
BILIRUB SERPL-MCNC: 0.3 MG/DL (ref 0–1.2)
BUN SERPL-MCNC: 14 MG/DL (ref 8–27)
BUN/CREAT SERPL: 13 (ref 12–28)
CALCIUM SERPL-MCNC: 10 MG/DL (ref 8.7–10.3)
CHLORIDE SERPL-SCNC: 110 MMOL/L (ref 96–106)
CHOLEST SERPL-MCNC: 176 MG/DL (ref 100–199)
CO2 SERPL-SCNC: 23 MMOL/L (ref 20–29)
CREAT SERPL-MCNC: 1.12 MG/DL (ref 0.57–1)
CREAT UR-MCNC: 176.9 MG/DL
EST. AVERAGE GLUCOSE BLD GHB EST-MCNC: 157 MG/DL
GLOBULIN SER CALC-MCNC: 3.2 G/DL (ref 1.5–4.5)
GLUCOSE SERPL-MCNC: 104 MG/DL (ref 65–99)
HBA1C MFR BLD: 7.1 % (ref 4.8–5.6)
HDLC SERPL-MCNC: 56 MG/DL
IMP & REVIEW OF LAB RESULTS: NORMAL
INTERPRETATION: NORMAL
LDLC SERPL CALC-MCNC: 105 MG/DL (ref 0–99)
MICROALBUMIN UR-MCNC: 127.8 UG/ML
POTASSIUM SERPL-SCNC: 4.9 MMOL/L (ref 3.5–5.2)
PROT SERPL-MCNC: 7.8 G/DL (ref 6–8.5)
SODIUM SERPL-SCNC: 149 MMOL/L (ref 134–144)
TRIGL SERPL-MCNC: 80 MG/DL (ref 0–149)
VLDLC SERPL CALC-MCNC: 15 MG/DL (ref 5–40)

## 2022-01-27 ENCOUNTER — OFFICE VISIT (OUTPATIENT)
Dept: ENDOCRINOLOGY | Age: 72
End: 2022-01-27
Payer: MEDICARE

## 2022-01-27 VITALS
BODY MASS INDEX: 38.16 KG/M2 | OXYGEN SATURATION: 98 % | TEMPERATURE: 97.6 F | HEART RATE: 76 BPM | WEIGHT: 215.4 LBS | HEIGHT: 63 IN

## 2022-01-27 DIAGNOSIS — I10 ESSENTIAL HYPERTENSION: ICD-10-CM

## 2022-01-27 DIAGNOSIS — R80.1 PERSISTENT PROTEINURIA: ICD-10-CM

## 2022-01-27 DIAGNOSIS — E11.65 TYPE 2 DIABETES MELLITUS WITH HYPERGLYCEMIA, WITHOUT LONG-TERM CURRENT USE OF INSULIN (HCC): Primary | ICD-10-CM

## 2022-01-27 DIAGNOSIS — E11.21 TYPE 2 DIABETES WITH NEPHROPATHY (HCC): ICD-10-CM

## 2022-01-27 DIAGNOSIS — I10 ESSENTIAL HYPERTENSION, BENIGN: ICD-10-CM

## 2022-01-27 PROCEDURE — G8432 DEP SCR NOT DOC, RNG: HCPCS | Performed by: INTERNAL MEDICINE

## 2022-01-27 PROCEDURE — G8756 NO BP MEASURE DOC: HCPCS | Performed by: INTERNAL MEDICINE

## 2022-01-27 PROCEDURE — 1101F PT FALLS ASSESS-DOCD LE1/YR: CPT | Performed by: INTERNAL MEDICINE

## 2022-01-27 PROCEDURE — G8536 NO DOC ELDER MAL SCRN: HCPCS | Performed by: INTERNAL MEDICINE

## 2022-01-27 PROCEDURE — G8417 CALC BMI ABV UP PARAM F/U: HCPCS | Performed by: INTERNAL MEDICINE

## 2022-01-27 PROCEDURE — 99214 OFFICE O/P EST MOD 30 MIN: CPT | Performed by: INTERNAL MEDICINE

## 2022-01-27 PROCEDURE — 1090F PRES/ABSN URINE INCON ASSESS: CPT | Performed by: INTERNAL MEDICINE

## 2022-01-27 PROCEDURE — 3051F HG A1C>EQUAL 7.0%<8.0%: CPT | Performed by: INTERNAL MEDICINE

## 2022-01-27 PROCEDURE — 2022F DILAT RTA XM EVC RTNOPTHY: CPT | Performed by: INTERNAL MEDICINE

## 2022-01-27 PROCEDURE — 3017F COLORECTAL CA SCREEN DOC REV: CPT | Performed by: INTERNAL MEDICINE

## 2022-01-27 PROCEDURE — G8427 DOCREV CUR MEDS BY ELIG CLIN: HCPCS | Performed by: INTERNAL MEDICINE

## 2022-01-27 PROCEDURE — G8400 PT W/DXA NO RESULTS DOC: HCPCS | Performed by: INTERNAL MEDICINE

## 2022-01-27 RX ORDER — REPAGLINIDE 2 MG/1
TABLET ORAL
Qty: 270 TABLET | Refills: 3 | Status: SHIPPED | OUTPATIENT
Start: 2022-01-27 | End: 2022-10-31

## 2022-01-27 NOTE — PATIENT INSTRUCTIONS
SPECIFIC INSTRUCTIONS BELOW           DRINK water   Do not skip meals  Do not eat in between meals    Reduce carbs- pasta, rice, potatoes, bread   Try to avoid processed bread products like BISCUITS, CROISSANTS, MUFFINS    Do not drink juices or sodas, even if they are calorie zero or diet drinks and especially avoid using powders like crystalloids , MARYANN-AIDS     Do not eat peanut butter     Do not eat sugar free cookies and cakes   Do not eat peaches, oranges, pineapples, raisins, grapes , canteloupe , honey dew, mangoes , watermelon  and fruit medleys'      ----------------------------------------------------------------------------------------------------       lipitor at   40 mg at night      Metformin Er 1 gm twice a day with meals       amaryl 4 mg at night time   ( print it  - she buys it )       prandin 2 mg   Three  times a day before each meal      Stay on  bydureon b-cise  2 mg a  Week           -------------PAY ATTENTION TO THESE GENERAL INSTRUCTIONS -----------------      - The medications prescribed at this visit will not be available at pharmacy until 6 pm       - YOUR MED LIST IS NOT UP TO DATE AS SOME CHANGES ARE BEING MADE AFTER THE VISIT - FOLLOW SPECIFIC INSTRUCTIONS  ABOVE     -ANY tests other than blood work, which you opt to do  outside the  [facility] imaging facilities, you are responsible for prior authorizations if  required    - 18 Christiane Cartagena UP TO DATE ON YOUR AVS- PLEASE IGNORE     Results     *Normal results will not be notified by a phone call starting January 1 2021   *If you have an upcoming visit, the results will be discussed at the visit   *Please sign up for MY CHART if you want access to your lab and test results  *Abnormal results which require immediate attention will be notified by phone call   *Abnormal results which do not require immediate assistance will be notified in 1-2 weeks       Refills    -    have your pharmacy send us a refill request . Refills are done max for one year and a visit is a must before refills are extended    Follow up appointments -  highly encourage you to make it when you are checking out. We can accommodate you into the schedule based on your clinical situation, but not for extending refills beyond a year. Labs are important to give refills and is important to get labs before the visit     Phone calls  -  Allow  24 hrs.  for non-urgent calls to be returned  Prior authorization - It may take 2-4 weeks to process  Forms  -  FMLA, DMV etc., will take up to 2 weeks to process  Cancellations - please notify the office 2 days in advance   Samples  - will only be dispensed at visits       If not showing for the appointments and cancelling appointments within 24 hours are kept track of and three  of such situations in  two consecutive years will likely be considered for termination from the practice    -------------------------------------------------------------------------------------------------------------------

## 2022-01-27 NOTE — PROGRESS NOTES
HISTORY OF PRESENT ILLNESS  Janeth Simpson is a 70 y.o. female. Patient here for f/u after last  visit of Type 2 diabetes mellitus  From   Sept 2021    She has done very well   She could not refill starlix       Sept 2021     She is suffering  with rash on and off       March 2021     She still has mild suffering  Still on right groin  She has changed some bath soaps   She is doing very well by log         Jan 26 2021     Pt has severe vaginal infection  She tried taking diflucan  And saw patient first   She has severe symptoms  -  Miserable   Her pcp visit is not available         Old history :   Referred : by self/pcp  Pt of Dr. Juan Manuel Awan   He transferred care as Dr. Juan Manuel Awan left   H/o diabetes for 5  years   Current A1C is 6.9 % from sept 2013  and symptoms/problems include none   Current diabetic medications include glyburide 5 mg 2 pills a day  and victoza 1.8      Review of Systems   Constitutional: Negative. Skin - has rash       Physical Exam   Constitutional: She is oriented to person, place, and time. She appears well-developed and well-nourished. Abdominal: Soft.  Bowel sounds are normal.         Lab Results   Component Value Date/Time    Hemoglobin A1c 7.1 (H) 01/19/2022 11:07 AM    Hemoglobin A1c 7.9 (H) 09/20/2021 12:00 AM    Hemoglobin A1c 6.8 (H) 12/28/2020 09:27 AM    Glucose 104 (H) 01/19/2022 11:07 AM    Glucose  05/05/2015 09:18 AM    Microalbumin/Creat ratio (mg/g creat) 67 (H) 03/08/2021 08:59 AM    Microalb/Creat ratio (ug/mg creat.) 72 (H) 01/19/2022 11:07 AM    Microalbumin,urine random 7.09 03/08/2021 08:59 AM    LDL, calculated 105 (H) 01/19/2022 11:07 AM    LDL, calculated 91 03/08/2021 08:59 AM    Creatinine 1.12 (H) 01/19/2022 11:07 AM      Lab Results   Component Value Date/Time    Cholesterol, total 176 01/19/2022 11:07 AM    HDL Cholesterol 56 01/19/2022 11:07 AM    LDL, calculated 105 (H) 01/19/2022 11:07 AM    LDL, calculated 91 03/08/2021 08:59 AM    Triglyceride 80 01/19/2022 11:07 AM    CHOL/HDL Ratio 2.7 03/08/2021 08:59 AM     Lab Results   Component Value Date/Time    ALT (SGPT) 22 01/19/2022 11:07 AM    Alk. phosphatase 219 (H) 01/19/2022 11:07 AM    Bilirubin, total 0.3 01/19/2022 11:07 AM     Lab Results   Component Value Date/Time    GFR est AA 57 (L) 01/19/2022 11:07 AM    GFR est non-AA 50 (L) 01/19/2022 11:07 AM    Creatinine 1.12 (H) 01/19/2022 11:07 AM    BUN 14 01/19/2022 11:07 AM    Sodium 149 (H) 01/19/2022 11:07 AM    Potassium 4.9 01/19/2022 11:07 AM    Chloride 110 (H) 01/19/2022 11:07 AM    CO2 23 01/19/2022 11:07 AM              ASSESSMENT and PLAN    1. Type 2 DM uncontrolled : A1c is  7.1 %       From  Jan 2022   Compared to    7.9 %     From sept 2021   Compared to    6.8   %   From today march 2021   Compared to  6.8 %    From  Dec 2020     compared to    8.5 %    From sept 2020  by POC      Compared to  7.6 %     From   May 2020   Compared tp      7.6 %     From   Dec 2019     Compared to     7 %     From    May 2019     Compared  To  7.8 %     From   Jan 2019       compared to    7.4 %    FROM OCT 2018    COMPARED   TO 6.9 %     From   June 2018    compared to   7.3 %      From    march 2018     Compared to  7.7 %     From sept 2017    compared to    7.5  %     From  May 2017          Jan 2021   Improved control   Stay on  amaryl  4 mg , prandin tid, bydureon, metformin er 1 gm bid         Sept 2021    Losing control   Her sugars are higher from not eating right   Counseled her to eat right   Stay on  amaryl  4 mg , prandin tid, bydureon, metformin er 1 gm bid       March 2021   Reviewed log and she is doing well, commended her   She has not attended any banquets since covid started -   Stay on  amaryl  4 mg , prandin tid, bydureon, metformin er 1 gm bid       Jan 2021     Improved control   She did it all   She is eating early   She is using the meter , checks only fasting -  99 to 180 mg   Could not use [employer] - old phone   Stay on  amaryl  4 mg , prandin tid, bydureon, metformin er 1 gm bid   She started  on  januvia 100 mg a day  Aug 2016 , and had to be stopped for RASH        2. Hypoglycemia :  Educated on treating the hypoglycemia. 3. Elevated alk phos -  In presence of good glycemic control  -  New diagnosis,   Discussed  usg abd - normal   normal  bone iso-enzymes         4.  Skin rash - she still is suffering from skin rash  - for  6 months,   She tried all remedies, unable to get any derm consult   Wrote for 0.25 % hydrocortisone cream       Reviewed results with patient and discussed the labs being ordered today/bnv  Patient voiced understanding of plan of care

## 2022-02-15 RX ORDER — GLIMEPIRIDE 4 MG/1
TABLET ORAL
Qty: 90 TABLET | Refills: 2 | Status: SHIPPED | OUTPATIENT
Start: 2022-02-15

## 2022-03-15 RX ORDER — METFORMIN HYDROCHLORIDE 500 MG/1
TABLET, EXTENDED RELEASE ORAL
Qty: 360 TABLET | Refills: 4 | Status: SHIPPED | OUTPATIENT
Start: 2022-03-15

## 2022-03-18 PROBLEM — R35.0 INCREASED FREQUENCY OF URINATION: Status: ACTIVE | Noted: 2020-09-11

## 2022-03-18 PROBLEM — E66.01 OBESITY, MORBID (HCC): Status: ACTIVE | Noted: 2018-03-26

## 2022-03-19 PROBLEM — E11.21 TYPE 2 DIABETES WITH NEPHROPATHY (HCC): Status: ACTIVE | Noted: 2018-03-26

## 2022-07-21 LAB
ALBUMIN SERPL-MCNC: 4.2 G/DL (ref 3.7–4.7)
ALBUMIN/CREAT UR: 83 MG/G CREAT (ref 0–29)
ALBUMIN/GLOB SERPL: 1.4 {RATIO} (ref 1.2–2.2)
ALP SERPL-CCNC: 183 IU/L (ref 44–121)
ALT SERPL-CCNC: 18 IU/L (ref 0–32)
AST SERPL-CCNC: 17 IU/L (ref 0–40)
BILIRUB SERPL-MCNC: 0.3 MG/DL (ref 0–1.2)
BUN SERPL-MCNC: 12 MG/DL (ref 8–27)
BUN/CREAT SERPL: 11 (ref 12–28)
CALCIUM SERPL-MCNC: 9.9 MG/DL (ref 8.7–10.3)
CHLORIDE SERPL-SCNC: 106 MMOL/L (ref 96–106)
CHOLEST SERPL-MCNC: 162 MG/DL (ref 100–199)
CO2 SERPL-SCNC: 24 MMOL/L (ref 20–29)
CREAT SERPL-MCNC: 1.07 MG/DL (ref 0.57–1)
CREAT UR-MCNC: 247.6 MG/DL
EGFR: 55 ML/MIN/1.73
EST. AVERAGE GLUCOSE BLD GHB EST-MCNC: 180 MG/DL
GLOBULIN SER CALC-MCNC: 2.9 G/DL (ref 1.5–4.5)
GLUCOSE SERPL-MCNC: 152 MG/DL (ref 65–99)
HBA1C MFR BLD: 7.9 % (ref 4.8–5.6)
HDLC SERPL-MCNC: 55 MG/DL
IMP & REVIEW OF LAB RESULTS: NORMAL
INTERPRETATION: NORMAL
LDLC SERPL CALC-MCNC: 92 MG/DL (ref 0–99)
MICROALBUMIN UR-MCNC: 204.4 UG/ML
POTASSIUM SERPL-SCNC: 4.9 MMOL/L (ref 3.5–5.2)
PROT SERPL-MCNC: 7.1 G/DL (ref 6–8.5)
SODIUM SERPL-SCNC: 146 MMOL/L (ref 134–144)
TRIGL SERPL-MCNC: 81 MG/DL (ref 0–149)
VLDLC SERPL CALC-MCNC: 15 MG/DL (ref 5–40)

## 2022-07-27 ENCOUNTER — OFFICE VISIT (OUTPATIENT)
Dept: ENDOCRINOLOGY | Age: 72
End: 2022-07-27
Payer: MEDICARE

## 2022-07-27 VITALS
HEIGHT: 63 IN | WEIGHT: 218.8 LBS | TEMPERATURE: 97.7 F | DIASTOLIC BLOOD PRESSURE: 76 MMHG | SYSTOLIC BLOOD PRESSURE: 162 MMHG | HEART RATE: 88 BPM | BODY MASS INDEX: 38.77 KG/M2 | OXYGEN SATURATION: 99 %

## 2022-07-27 DIAGNOSIS — E11.65 TYPE 2 DIABETES MELLITUS WITH HYPERGLYCEMIA, WITHOUT LONG-TERM CURRENT USE OF INSULIN (HCC): Primary | ICD-10-CM

## 2022-07-27 DIAGNOSIS — R80.1 PERSISTENT PROTEINURIA: ICD-10-CM

## 2022-07-27 DIAGNOSIS — I10 ESSENTIAL HYPERTENSION, BENIGN: ICD-10-CM

## 2022-07-27 DIAGNOSIS — E78.2 MIXED HYPERLIPIDEMIA: ICD-10-CM

## 2022-07-27 DIAGNOSIS — I10 ESSENTIAL HYPERTENSION: ICD-10-CM

## 2022-07-27 PROCEDURE — G8400 PT W/DXA NO RESULTS DOC: HCPCS | Performed by: INTERNAL MEDICINE

## 2022-07-27 PROCEDURE — 99214 OFFICE O/P EST MOD 30 MIN: CPT | Performed by: INTERNAL MEDICINE

## 2022-07-27 PROCEDURE — 1090F PRES/ABSN URINE INCON ASSESS: CPT | Performed by: INTERNAL MEDICINE

## 2022-07-27 PROCEDURE — 3017F COLORECTAL CA SCREEN DOC REV: CPT | Performed by: INTERNAL MEDICINE

## 2022-07-27 PROCEDURE — 3051F HG A1C>EQUAL 7.0%<8.0%: CPT | Performed by: INTERNAL MEDICINE

## 2022-07-27 PROCEDURE — G8536 NO DOC ELDER MAL SCRN: HCPCS | Performed by: INTERNAL MEDICINE

## 2022-07-27 PROCEDURE — G8417 CALC BMI ABV UP PARAM F/U: HCPCS | Performed by: INTERNAL MEDICINE

## 2022-07-27 PROCEDURE — G8753 SYS BP > OR = 140: HCPCS | Performed by: INTERNAL MEDICINE

## 2022-07-27 PROCEDURE — 1101F PT FALLS ASSESS-DOCD LE1/YR: CPT | Performed by: INTERNAL MEDICINE

## 2022-07-27 PROCEDURE — G8754 DIAS BP LESS 90: HCPCS | Performed by: INTERNAL MEDICINE

## 2022-07-27 PROCEDURE — 1123F ACP DISCUSS/DSCN MKR DOCD: CPT | Performed by: INTERNAL MEDICINE

## 2022-07-27 PROCEDURE — G8510 SCR DEP NEG, NO PLAN REQD: HCPCS | Performed by: INTERNAL MEDICINE

## 2022-07-27 PROCEDURE — G8427 DOCREV CUR MEDS BY ELIG CLIN: HCPCS | Performed by: INTERNAL MEDICINE

## 2022-07-27 PROCEDURE — 2022F DILAT RTA XM EVC RTNOPTHY: CPT | Performed by: INTERNAL MEDICINE

## 2022-07-27 RX ORDER — AMLODIPINE BESYLATE 10 MG/1
10 TABLET ORAL DAILY
Qty: 90 TABLET | Refills: 4 | Status: SHIPPED | OUTPATIENT
Start: 2022-07-27

## 2022-07-27 RX ORDER — EXENATIDE 2 MG/.85ML
INJECTION, SUSPENSION, EXTENDED RELEASE SUBCUTANEOUS
Qty: 10.2 ML | Refills: 3 | Status: SHIPPED | OUTPATIENT
Start: 2022-07-27

## 2022-07-27 RX ORDER — FLUCONAZOLE 150 MG/1
150 TABLET ORAL DAILY
Qty: 1 TABLET | Refills: 2 | Status: SHIPPED | OUTPATIENT
Start: 2022-07-27 | End: 2022-07-28

## 2022-07-27 RX ORDER — SODIUM BICARBONATE 650 MG/1
650 TABLET ORAL 2 TIMES DAILY
COMMUNITY
Start: 2022-07-07

## 2022-07-27 NOTE — PATIENT INSTRUCTIONS
SPECIFIC INSTRUCTIONS BELOW         DRINK water   Do not skip meals  Do not eat in between meals    Reduce carbs- pasta, rice, potatoes, bread   Try to avoid processed bread products like BISCUITS, CROISSANTS, MUFFINS    Do not drink juices or sodas, even if they are calorie zero or diet drinks and especially avoid using powders like crystalloids , MARYANN-AIDS     Do not eat peanut butter     Do not eat sugar free cookies and cakes   Do not eat peaches, oranges, pineapples, raisins, grapes , canteloupe , honey dew, mangoes , watermelon  and fruit medleys'      ----------------------------------------------------------------------------------------------------       lipitor at   40 mg at night      Metformin Er 1 gm twice a day with meals       amaryl 4 mg at night time      prandin 2 mg   Three  times a day before each meal      Stay on  bydureon b-cise  2 mg a  Week      Start on farxiga 5 mg a day before b-fast                -------------PAY ATTENTION TO THESE GENERAL INSTRUCTIONS -----------------      - The medications prescribed at this visit will not be available at pharmacy until 6 pm       - YOUR MED LIST IS NOT UP TO DATE AS SOME CHANGES ARE BEING MADE AFTER THE VISIT - FOLLOW SPECIFIC INSTRUCTIONS  ABOVE     -ANY tests other than blood work, which you opt to do  outside the  [facility] imaging facilities, you are responsible for prior authorizations if  required    - 18 Christiane Cartagena UP TO DATE ON YOUR AVS- PLEASE IGNORE     Results     *Normal results will not be notified by a phone call starting January 1 2021   *If you have an upcoming visit, the results will be discussed at the visit   *Please sign up for MY CHART if you want access to your lab and test results  *Abnormal results which require immediate attention will be notified by phone call   *Abnormal results which do not require immediate assistance will be notified in 1-2 weeks       Refills    -    have your pharmacy send us a refill request . Refills are done max for one year and a visit is a must before refills are extended    Follow up appointments -  highly encourage you to make it when you are checking out. We can accommodate you into the schedule based on your clinical situation, but not for extending refills beyond a year. Labs are important to give refills and is important to get labs before the visit     Phone calls  -  Allow  24 hrs.  for non-urgent calls to be returned  Prior authorization - It may take 2-4 weeks to process  Forms  -  FMLA, DMV etc., will take up to 2 weeks to process  Cancellations - please notify the office 2 days in advance   Samples  - will only be dispensed at visits       If not showing for the appointments and cancelling appointments within 24 hours are kept track of and three  of such situations in  two consecutive years will likely be considered for termination from the practice    -------------------------------------------------------------------------------------------------------------------

## 2022-07-27 NOTE — PROGRESS NOTES
HISTORY OF PRESENT ILLNESS  Radha Juarez is a 67 y.o. female. Patient here for f/u after last  visit of Type 2 diabetes mellitus  From   jan 2022     She had COVID in the interim   She had gotten antibiotics     Son is diagnosed with colon cancer   She feels sad       Jan 2022     She has done very well   She could not refill starlix       Old history :   Referred : by self/pcp  Pt of Dr. Flakita Mccallum   He transferred care as Dr. Flakita Mccallum left   H/o diabetes for 5  years   Current A1C is 6.9 % from sept 2013  and symptoms/problems include none   Current diabetic medications include glyburide 5 mg 2 pills a day  and victoza 1.8      Review of Systems   Constitutional: Negative. Physical Exam   Constitutional: She is oriented to person, place, and time. She appears well-developed and well-nourished. Abdominal: Soft. Bowel sounds are normal.         Lab Results   Component Value Date/Time    Hemoglobin A1c 7.9 (H) 07/20/2022 12:00 AM    Hemoglobin A1c 7.1 (H) 01/19/2022 11:07 AM    Hemoglobin A1c 7.9 (H) 09/20/2021 12:00 AM    Glucose 152 (H) 07/20/2022 12:00 AM    Glucose  05/05/2015 09:18 AM    Microalbumin/Creat ratio (mg/g creat) 67 (H) 03/08/2021 08:59 AM    Microalb/Creat ratio (ug/mg creat.) 83 (H) 07/20/2022 12:00 AM    Microalbumin,urine random 7.09 03/08/2021 08:59 AM    LDL, calculated 92 07/20/2022 12:00 AM    LDL, calculated 91 03/08/2021 08:59 AM    Creatinine 1.07 (H) 07/20/2022 12:00 AM      Lab Results   Component Value Date/Time    Cholesterol, total 162 07/20/2022 12:00 AM    HDL Cholesterol 55 07/20/2022 12:00 AM    LDL, calculated 92 07/20/2022 12:00 AM    LDL, calculated 91 03/08/2021 08:59 AM    Triglyceride 81 07/20/2022 12:00 AM    CHOL/HDL Ratio 2.7 03/08/2021 08:59 AM     Lab Results   Component Value Date/Time    ALT (SGPT) 18 07/20/2022 12:00 AM    Alk.  phosphatase 183 (H) 07/20/2022 12:00 AM    Bilirubin, total 0.3 07/20/2022 12:00 AM     Lab Results   Component Value Date/Time    GFR est AA 57 (L) 01/19/2022 11:07 AM    GFR est non-AA 50 (L) 01/19/2022 11:07 AM    Creatinine 1.07 (H) 07/20/2022 12:00 AM    BUN 12 07/20/2022 12:00 AM    Sodium 146 (H) 07/20/2022 12:00 AM    Potassium 4.9 07/20/2022 12:00 AM    Chloride 106 07/20/2022 12:00 AM    CO2 24 07/20/2022 12:00 AM              ASSESSMENT and PLAN    1. Type 2 DM uncontrolled : A1c is  7.9 %     from    July 2022   compared to    7.1 %       From  Jan 2022   Compared to    7.9 %     From sept 2021   Compared to    6.8   %   From today march 2021   Compared to  6.8 %    From  Dec 2020     compared to    8.5 %    From sept 2020  by POC      Compared to  7.6 %     From   May 2020   Compared tp      7.6 %     From   Dec 2019     Compared to     7 %     From    May 2019     Compared  To  7.8 %     From   Jan 2019       compared to    7.4 %    FROM OCT 2018    COMPARED   TO 6.9 %     From   June 2018    compared to   7.3 %      From    march 2018 July 2022     Lost control    from diet  non -compliance   Stay on  amaryl  4 mg , prandin tid, bydureon b-cise, metformin er 1 gm bid  ( NOT taking prandin with b-fast and lunch  )   COULD NOT tolerate jardiance - GAVE  her rash and she had candidiasis   Will try farxiga 5 mg a day       Jan 2021   Improved control   Stay on  amaryl  4 mg , prandin tid, bydureon, metformin er 1 gm bid       Sept 2021  Losing control   Her sugars are higher from not eating right   Counseled her to eat right   Stay on  amaryl  4 mg , prandin tid, bydureon, metformin er 1 gm bid         2. Hypoglycemia :  Educated on treating the hypoglycemia. 3. Elevated alk phos -  In presence of good glycemic control  -  New diagnosis,   Trending down   Discussed  usg abd - normal   normal  bone iso-enzymes       4. Htn : on norvasc and lisinopril       5. Hyperlipidemia : on lipitor         6.  CKD - follows up with Dr. Branden Ware - on sodabicarb         Reviewed results with patient and discussed the labs being ordered today/bnv  Patient voiced understanding of plan of care

## 2022-07-27 NOTE — LETTER
7/27/2022    Patient: Charity Sacks   YOB: 1950   Date of Visit: 7/27/2022     Alan Pozo MD  [address] [place] [address]  [address] [facility] [address]    Dear Alan Pozo MD,      Thank you for referring Ms. Teodoro Zimmer to [address] [address] for evaluation. My notes for this consultation are attached. If you have questions, please do not hesitate to call me. I look forward to following your patient along with you.       Sincerely,    Leoncio Pillai MD

## 2022-10-06 ENCOUNTER — OFFICE VISIT (OUTPATIENT)
Dept: PRIMARY CARE CLINIC | Age: 72
End: 2022-10-06
Payer: MEDICARE

## 2022-10-06 VITALS
BODY MASS INDEX: 37.95 KG/M2 | OXYGEN SATURATION: 98 % | SYSTOLIC BLOOD PRESSURE: 136 MMHG | RESPIRATION RATE: 20 BRPM | TEMPERATURE: 97.2 F | HEART RATE: 89 BPM | DIASTOLIC BLOOD PRESSURE: 74 MMHG | HEIGHT: 63 IN | WEIGHT: 214.2 LBS

## 2022-10-06 DIAGNOSIS — Z78.0 POSTMENOPAUSAL: ICD-10-CM

## 2022-10-06 DIAGNOSIS — E11.21 TYPE 2 DIABETES WITH NEPHROPATHY (HCC): ICD-10-CM

## 2022-10-06 DIAGNOSIS — Z00.00 MEDICARE ANNUAL WELLNESS VISIT, SUBSEQUENT: Primary | ICD-10-CM

## 2022-10-06 DIAGNOSIS — Z12.31 ENCOUNTER FOR SCREENING MAMMOGRAM FOR MALIGNANT NEOPLASM OF BREAST: ICD-10-CM

## 2022-10-06 DIAGNOSIS — I10 ESSENTIAL HYPERTENSION WITH GOAL BLOOD PRESSURE LESS THAN 130/80: ICD-10-CM

## 2022-10-06 DIAGNOSIS — Z11.59 NEED FOR HEPATITIS C SCREENING TEST: ICD-10-CM

## 2022-10-06 DIAGNOSIS — E78.2 MIXED HYPERLIPIDEMIA: ICD-10-CM

## 2022-10-06 DIAGNOSIS — Z23 ENCOUNTER FOR IMMUNIZATION: ICD-10-CM

## 2022-10-06 PROCEDURE — G8427 DOCREV CUR MEDS BY ELIG CLIN: HCPCS | Performed by: FAMILY MEDICINE

## 2022-10-06 PROCEDURE — G8400 PT W/DXA NO RESULTS DOC: HCPCS | Performed by: FAMILY MEDICINE

## 2022-10-06 PROCEDURE — G0008 ADMIN INFLUENZA VIRUS VAC: HCPCS | Performed by: FAMILY MEDICINE

## 2022-10-06 PROCEDURE — 3051F HG A1C>EQUAL 7.0%<8.0%: CPT | Performed by: FAMILY MEDICINE

## 2022-10-06 PROCEDURE — 2022F DILAT RTA XM EVC RTNOPTHY: CPT | Performed by: FAMILY MEDICINE

## 2022-10-06 PROCEDURE — G0439 PPPS, SUBSEQ VISIT: HCPCS | Performed by: FAMILY MEDICINE

## 2022-10-06 PROCEDURE — G8536 NO DOC ELDER MAL SCRN: HCPCS | Performed by: FAMILY MEDICINE

## 2022-10-06 PROCEDURE — G8752 SYS BP LESS 140: HCPCS | Performed by: FAMILY MEDICINE

## 2022-10-06 PROCEDURE — G9899 SCRN MAM PERF RSLTS DOC: HCPCS | Performed by: FAMILY MEDICINE

## 2022-10-06 PROCEDURE — G8417 CALC BMI ABV UP PARAM F/U: HCPCS | Performed by: FAMILY MEDICINE

## 2022-10-06 PROCEDURE — 1090F PRES/ABSN URINE INCON ASSESS: CPT | Performed by: FAMILY MEDICINE

## 2022-10-06 PROCEDURE — 1101F PT FALLS ASSESS-DOCD LE1/YR: CPT | Performed by: FAMILY MEDICINE

## 2022-10-06 PROCEDURE — 99203 OFFICE O/P NEW LOW 30 MIN: CPT | Performed by: FAMILY MEDICINE

## 2022-10-06 PROCEDURE — 1123F ACP DISCUSS/DSCN MKR DOCD: CPT | Performed by: FAMILY MEDICINE

## 2022-10-06 PROCEDURE — G8510 SCR DEP NEG, NO PLAN REQD: HCPCS | Performed by: FAMILY MEDICINE

## 2022-10-06 PROCEDURE — G8754 DIAS BP LESS 90: HCPCS | Performed by: FAMILY MEDICINE

## 2022-10-06 PROCEDURE — 90694 VACC AIIV4 NO PRSRV 0.5ML IM: CPT | Performed by: FAMILY MEDICINE

## 2022-10-06 PROCEDURE — 3017F COLORECTAL CA SCREEN DOC REV: CPT | Performed by: FAMILY MEDICINE

## 2022-10-06 RX ORDER — ZOSTER VACCINE RECOMBINANT, ADJUVANTED 50 MCG/0.5
0.5 KIT INTRAMUSCULAR ONCE
Qty: 0.5 ML | Refills: 1 | Status: SHIPPED | OUTPATIENT
Start: 2022-10-06 | End: 2022-10-06

## 2022-10-06 NOTE — PROGRESS NOTES
HPI     Chief Complaint:   Chief Complaint   Patient presents with   [address] is an 67 y.o. female. Patient was previously receiving care with Dr. Bettie Ceron, who left the practice  Medical history significant for:   Patient Active Problem List   Diagnosis Code    Diabetes ([facility] E11.9    Hyperglycemia due to type 2 diabetes mellitus ([facility] E11.65    Essential hypertension with goal blood pressure less than 130/80 I10    Hyperlipidemia E78.5    Obesity, morbid ([facility] E66.01    Type 2 diabetes with nephropathy ([facility] E11.21    Increased frequency of urination R35.0       Concerns for today's visit:    T2DM:  no log today. Follows with Dr. Thao Fung.  Lab Results   Component Value Date/Time    Hemoglobin A1c 7.9 (H) 07/20/2022 12:00 AM    Hemoglobin A1c (POC) 6.8 03/15/2021 12:08 PM      Diabetes Checklist  ACE inhibitor: lisinopril  Last Lipid panel: UTD  Statin: atorvastatin  Last Microalbumin: UTD  Pneumonia vaccine 19-64?: reviewed  Last seen by opthalmology: reviewed     HTN: reports it is usually well controlled. No dizziness. HLD: compliant with statin, no myalgias. Lab Results   Component Value Date/Time    Cholesterol, total 162 07/20/2022 12:00 AM    HDL Cholesterol 55 07/20/2022 12:00 AM    LDL, calculated 92 07/20/2022 12:00 AM    LDL, calculated 91 03/08/2021 08:59 AM    VLDL, calculated 15 07/20/2022 12:00 AM    VLDL, calculated 15 03/08/2021 08:59 AM    Triglyceride 81 07/20/2022 12:00 AM    CHOL/HDL Ratio 2.7 03/08/2021 08:59 AM          Medications - reviewed:  Current Outpatient Medications on File Prior to Visit   Medication Sig Dispense Refill    sodium bicarbonate 650 mg tablet Take 650 mg by mouth two (2) times a day.  exenatide microspheres (Bydureon BCise) 2 mg/0.85 mL atIn 2  mg   once weekly 10.2 mL 3    amLODIPine (NORVASC) 10 mg tablet Take 1 Tablet by mouth in the morning.  90 Tablet 4    dapagliflozin (Farxiga) 5 mg tab tablet One pill before b-fast 90 Tablet 3    metFORMIN ER (GLUCOPHAGE XR) 500 mg tablet TAKE 2 TABS BY MOUTH TWO (2) TIMES DAILY (WITH MEALS). STOP GLUMETZA 360 Tablet 4    glimepiride (AMARYL) 4 mg tablet TAKE 1 TABLET BY MOUTH EVERY DAY AT NIGHT 90 Tablet 2    lisinopriL (PRINIVIL, ZESTRIL) 10 mg tablet TAKE 1 TABLET BY MOUTH EVERY DAY 90 Tablet 3    atorvastatin (LIPITOR) 40 mg tablet TAKE 1 TAB BY MOUTH NIGHTLY. -----STOP 20 MG DOSE 90 Tablet 4    glucose blood VI test strips (CONTOUR NEXT TEST STRIPS) strip USE AS DIRECTED TWICE A DAY. Dx code E11.65 100 Strip 6    repaglinide (PRANDIN) 2 mg tablet TAKE ONE TABLET BY MOUTH DAILY BEFORE BREAKFAST, LUNCH, AND DINNER (Patient not taking: Reported on 10/6/2022) 270 Tablet 3    hydrocortisone (HYTONE) 2.5 % topical cream Apply  to affected area two (2) times a day. use thin layer (Patient not taking: Reported on 10/6/2022) 30 g 4     No current facility-administered medications on file prior to visit. Allergies - reviewed: Allergies   Allergen Reactions    Ciprofloxacin Rash    Januvia [Sitagliptin] Hives     Stopped for bad rash       Past Medical History - reviewed:  Past Medical History:   Diagnosis Date    Diabetes mellitus ([facility]     Hypercholesterolemia     Hypertension        Past Surgical History - reviewed:  Past Surgical History:   Procedure Laterality Date    HX  SECTION      x2         Immunizations - reviewed:   Immunization History   Administered Date(s) Administered     Influenza, Lonza Shannon (age 72 y+), High Dose 2020, 2021    COVID-19, PFIZER PURPLE top, DILUTE for use, (age 15 y+), IM, 30mcg/0.3mL 2021, 2021, 10/19/2021, 2022    Influenza High Dose Vaccine PF 2020    Tdap 2010       Review of Systems   Review of Systems   Constitutional:  Negative for chills and fever. Respiratory:  Negative for cough and shortness of breath. Cardiovascular:  Negative for chest pain and palpitations. Psychiatric/Behavioral:  Negative for hallucinations and substance abuse. Reviewed PmHx, FmHx, SocHx as well as meds and allergies, updated and dated in the chart. Objective     Visit Vitals  BP (!) 146/89 (BP 1 Location: Left upper arm, BP Patient Position: Sitting, BP Cuff Size: Large adult)   Pulse 89   Temp 97.2 °F (36.2 °C) (Temporal)   Resp 20   Ht 5' 3\" (1.6 m)   Wt 214 lb 3.2 oz (97.2 kg)   SpO2 98%   BMI 37.94 kg/m²       Physical Exam  Vitals and nursing note reviewed. Constitutional:       General: She is not in acute distress. HENT:      Head: Normocephalic and atraumatic. Cardiovascular:      Rate and Rhythm: Normal rate and regular rhythm. Pulmonary:      Effort: Pulmonary effort is normal. No respiratory distress. Breath sounds: Normal breath sounds. Skin:     General: Skin is warm. Coloration: Skin is not jaundiced. Neurological:      General: No focal deficit present. Mental Status: She is alert and oriented to person, place, and time. Psychiatric:         Mood and Affect: Mood normal.         Behavior: Behavior normal.           Assessment and Plan     Diagnoses and all orders for this visit:    1. Medicare annual wellness visit, subsequent    2. Type 2 diabetes with nephropathy (HCC)  Comments: Follows with Dr. Imani Rosa, has appt later this month. 3. Essential hypertension with goal blood pressure less than 130/80  Comments:  BP down on re-check monitor. 4. Mixed hyperlipidemia  Comments:  Aggressive risk factor management. 5. Need for hepatitis C screening test  -     HEPATITIS C AB    6. Postmenopausal  -     DEXA BONE DENSITY STUDY AXIAL; Future    7. Encounter for immunization  -     INFLUENZA, FLUZONE HIGH-DOSE, (AGE 72 Y+), IM, PF, 0.7 ML  -     varicella-zoster recombinant, PF, (Shingrix, PF,) 50 mcg/0.5 mL susr injection; 0.5 mL by IntraMUSCular route once for 1 dose.   -     pneumococcal 23-valent (PNEUMOVAX 23) 25 mcg/0.5 mL injection; 0.5 mL by IntraMUSCular route once for 1 dose. -     pneumococcal 13 abdifatah conj dip (PREVNAR-13) 0.5 mL syrg injection; 0.5 mL by IntraMUSCular route once for 1 dose. 8. Encounter for screening mammogram for malignant neoplasm of breast  -     ZAC MAMMO BI SCREENING INCL CAD; Future  -     REFERRAL TO GASTROENTEROLOGY       Follow-up and Dispositions    Return in about 6 months (around 4/6/2023) for F/U: DM, HTN, HLD. I discussed the aforementioned diagnoses with the patient as well as the plan of care. All questions were answered and an AVS was provided. As applicable:  Medication Side Effects and Warnings were discussed with patient, as indicated. Patient Labs were reviewed and or requested, as indicated. Patient Past Records were reviewed and or requested, as indicated.     Shanice Del Cid MD  [facility]  [address], [address] [place] [address]

## 2022-10-06 NOTE — PROGRESS NOTES
[facility]  1 [facility], [address] [place] [address]  [phone]    Date of visit: 10/6/2022       This is a Subsequent Medicare Annual Wellness Visit (AWV), (Performed more than 12 months after effective date of Medicare Part B enrollment and 12 months after last preventive visit.)    I have reviewed the patient's medical history in detail and updated the computerized patient record. History obtained from: the patient. female  67 y.o. BLACK/  Depression Risk Factor Screening:     3 most recent PHQ Screens 10/6/2022   Little interest or pleasure in doing things Not at all   Feeling down, depressed, irritable, or hopeless Not at all   Total Score PHQ 2 0     C-SSRS [place] Suicide Severity Rating Scale 9/17/2020   1) Within the past month, have you wished you were dead or wished you could go to sleep and not wake up? No   2) Have you actually had any thoughts of killing yourself? No   6) Have you ever done anything, started to do anything, or prepared to do anything to end your life? No       Fall Risk Factor Screening:     Fall Risk Assessment, last 12 mths 10/6/2022   Able to walk? Yes   Fall in past 12 months? 0   Do you feel unsteady? 0   Are you worried about falling 0       Alcohol Risk Screen    Do you average more than 1 drink per night or more than 7 drinks a week:  No    On any one occasion in the past three months have you have had more than 3 drinks containing alcohol:  No        Functional Ability and Level of Safety:    Hearing: Hearing is good. Activities of Daily Living: The home contains: no safety equipment. Patient does total self care      Ambulation: with no difficulty      Abuse Screen:  Patient is not abused         Histories     Reviewed PmHx, FmHx, SocHx as well as meds and allergies, updated and dated in the chart.     Patient Active Problem List   Diagnosis Code    Diabetes ([facility] E11.9    Hyperglycemia due to type 2 diabetes mellitus ([facility] E11.65    Essential hypertension with goal blood pressure less than 130/80 I10    Hyperlipidemia E78.5    Obesity, morbid (HCC) E66.01    Type 2 diabetes with nephropathy ([facility] E11.21    Increased frequency of urination R35.0     Past Medical History:   Diagnosis Date    Diabetes mellitus ([facility]     Hypercholesterolemia     Hypertension       Past Surgical History:   Procedure Laterality Date    HX  SECTION      x2     Allergies   Allergen Reactions    Ciprofloxacin Rash    Januvia [Sitagliptin] Hives     Stopped for bad rash     Current Outpatient Medications   Medication Sig Dispense Refill    varicella-zoster recombinant, PF, (Shingrix, PF,) 50 mcg/0.5 mL susr injection 0.5 mL by IntraMUSCular route once for 1 dose. 0.5 mL 1    pneumococcal 23-valent (PNEUMOVAX 23) 25 mcg/0.5 mL injection 0.5 mL by IntraMUSCular route once for 1 dose. 0.5 mL 0    pneumococcal 13 abdifatah conj dip (PREVNAR-13) 0.5 mL syrg injection 0.5 mL by IntraMUSCular route once for 1 dose. 0.5 mL 0    sodium bicarbonate 650 mg tablet Take 650 mg by mouth two (2) times a day. exenatide microspheres (Bydureon BCise) 2 mg/0.85 mL atIn 2  mg   once weekly 10.2 mL 3    amLODIPine (NORVASC) 10 mg tablet Take 1 Tablet by mouth in the morning. 90 Tablet 4    dapagliflozin (Farxiga) 5 mg tab tablet One pill before b-fast 90 Tablet 3    metFORMIN ER (GLUCOPHAGE XR) 500 mg tablet TAKE 2 TABS BY MOUTH TWO (2) TIMES DAILY (WITH MEALS). STOP GLUMETZA 360 Tablet 4    glimepiride (AMARYL) 4 mg tablet TAKE 1 TABLET BY MOUTH EVERY DAY AT NIGHT 90 Tablet 2    lisinopriL (PRINIVIL, ZESTRIL) 10 mg tablet TAKE 1 TABLET BY MOUTH EVERY DAY 90 Tablet 3    atorvastatin (LIPITOR) 40 mg tablet TAKE 1 TAB BY MOUTH NIGHTLY. -----STOP 20 MG DOSE 90 Tablet 4    glucose blood VI test strips (CONTOUR NEXT TEST STRIPS) strip USE AS DIRECTED TWICE A DAY.  Dx code E11.65 100 Strip 6    repaglinide (PRANDIN) 2 mg tablet TAKE ONE TABLET BY MOUTH DAILY BEFORE BREAKFAST, LUNCH, AND DINNER (Patient not taking: Reported on 10/6/2022) 270 Tablet 3    hydrocortisone (HYTONE) 2.5 % topical cream Apply  to affected area two (2) times a day. use thin layer (Patient not taking: Reported on 10/6/2022) 30 g 4     Family History   Problem Relation Age of Onset    Diabetes Mother     Diabetes Father     Diabetes Brother     Cancer Brother      Social History     Tobacco Use    Smoking status: Never    Smokeless tobacco: Never   Substance Use Topics    Alcohol use: No       Current Complaints and Pertinent Exam   If patient is due for chronic medical conditions and/or has acute concerns in addition to the medicare wellness visit, they have been informed there may be a copay for the additional services provided, and agree to do both. ROS & Physical Exam: please see acute note if applicable    Diet and Exercise: says diet is \"awful\". BP Readings from Last 3 Encounters:   10/06/22 (!) 146/89   22 (!) 162/76   21 131/70      Wt Readings from Last 3 Encounters:   10/06/22 214 lb 3.2 oz (97.2 kg)   22 218 lb 12.8 oz (99.2 kg)   22 215 lb 6.4 oz (97.7 kg)     Body mass index is 37.94 kg/m². No results found. Was the patient's timed Up & Go test unsteady or longer than 30 seconds? no    Evaluation of Cognitive Function   Mood/affect:  happy  Orientation: Person, Place, Time, and Situation  Appearance: age appropriate, casually dressed, and younger than stated age  Family member/caregiver input: n/a    Specialists/Care Team   Bharti Johnson  Jose Guadalupesulemakarol Barbara has established care with the following healthcare providers:  Patient Care Team:  Brad Dominguez MD as PCP - General (Family Medicine)  Brad Dominguez MD as PCP - [facility] Empaneled Provider  Eladio Muhammad MD (Endocrinology Physician)  Julius Vega MD as Physician (Ophthalmology)     [facility] Maintenance Topics with due status: Overdue       Topic Date Due    Hepatitis C Screening Never done    Pneumococcal 65+ years Never done    Colorectal Cancer Screening Combo Never done    Shingrix Vaccine Age 50> Never done    Breast Cancer Screen Mammogram Never done    Bone Densitometry (Dexa) Screening Never done    Eye Exam Retinal or Dilated 02/05/2020    DTaP/Tdap/Td series 12/06/2020    Foot Exam Q1 01/09/2021    Flu Vaccine 08/01/2022     Health Maintenance Topics with due status: Not Due       Topic Last Completion Date    A1C test (Diabetic or Prediabetic) 07/20/2022    MICROALBUMIN Q1 07/20/2022    Lipid Screen 07/20/2022    Depression Screen 07/27/2022    Medicare Yearly Exam 10/06/2022     Health Maintenance Topics with due status: Completed       Topic Last Completion Date    COVID-19 Vaccine 05/06/2022         Colon cancer:  Recommendation: Colonoscopy every 10y or annual FIT test from 50-75 or every 3 year stool DNA based test with consideration of ongoing screening from 76-85. and Due, ordered colonoscopy    Lung cancer (LDCT): Recommendation: Yearly LDCT for pts 55-77 w 30-pack year hx and currently smoke or quit <15 yr ago. and Not Indicated    Hepatitis C:  Recommendation: One time screening for all patient's aged 18-79. and Due, ordered    Diabetes:   Recommendation: USPSTF recommends screening ages 38-67 y/o if overweight or obese. Medicare covers screening in those patients who are overweight, obese, have HTN or dyslipiemia, a personal history of gestational diabetes or prior elevated blood sugar, a family hx of DM, or any patient over age 72    Lipids:   Recommendation: screening for hyperlipidemia every 5 years after age 39        PREVENTIVE CARE - FEMALE SCREENINGS     Cervical cancer: Recommendation: Every 3 yr from 21-29 and every 5 yr from 33-67, with Pap and HPV testing. and Not Indicated    Breast cancer: Recommendation:  USPSTF recommends screening mammography every 2 yr from 54-69.  The decision to start screening mammography in women prior to age 48 years should be an individual one. Women who place a higher value on the potential benefit than the potential harms may choose to begin biennial screening between the ages of 36 and 52 years. Medicare covers annual screening mammography and USPSTF also recommends women with a personal or family history of breast, ovarian, tubal, or peritoneal cancer or who have an ancestry ([address]) associated with breast cancer susceptibility 1 and 2 (BRCA1/2) gene mutations with an appropriate brief familial risk assessment tool. Women with a positive result on the risk assessment tool should receive genetic counseling and, if indicated after counseling, genetic testing    Osteoporosis: Recommendation: Screen all women at 72, earlier if elevated risk and Due, ordered    AAA:   Recommendation: One-time screening if family history of AAA and Not Indicated      IMMUNIZATIONS     Immunization History   Administered Date(s) Administered     Influenza, FLUZONE (age 72 y+), High Dose 08/24/2020, 09/02/2021    COVID-19, PFIZER PURPLE top, DILUTE for use, (age 15 y+), IM, 30mcg/0.3mL 02/23/2021, 03/16/2021, 10/19/2021, 05/06/2022    Influenza High Dose Vaccine PF 08/24/2020    Tdap 12/06/2010       Pneumovax:   Recommendation: PPSV23 once for all >65 and high risk <65  and Due, prescription sent to patient's preferred pharmacy    Prevnar:   Recommendation: PCV13 only if >65 and immunocompromised or residing in a nursing home, or in areas of low childhood Pneumococcal vaccination    Influenza:   Recommendation: Vaccination annually, high dose if 65 or older and Due, given in office today    Shingrix:  Recommendation: Vaccination 2 shots 2-6 months apart for all age >47 and Due, prescription sent to patient's preferred pharmacy    TDaP:    Recommendation: Vaccination Booster with TDaP every 10 yr. Discussion of Advance Directive   Discussed with Herman Beal Pin her ability to prepare and advance directive in the case that an injury or illness causes her to be unable to make health care decisions. Date of ACP Conversation: 10/06/22  Persons included in Conversation:  patient  Length of ACP Conversation in minutes:  <16 minutes (Non-Billable)    Authorized Decision Maker (if patient is incapable of making informed decisions): This person is:   Named in Advance Directive or Healthcare Power of             For Patients with Decision Making Capacity:   Values/Goals: Exploration of values, goals, and preferences if recovery is not expected, even with continued medical treatment in the event of:  Imminent death  Severe, permanent brain injury  \"In these circumstances, what matters most to you? \"  Care focused more on comfort or quality of life. Conversation Outcomes / Follow-Up Plan:   ACP complete - no further action today    Assessment/Plan     Diagnoses and all orders for this visit:    1. Medicare annual wellness visit, subsequent    2. Type 2 diabetes with nephropathy (HCC)  Comments: Follows with Dr. Ilana Orta, has appt later this month. 3. Essential hypertension with goal blood pressure less than 130/80  Comments:  BP down on re-check monitor. 4. Mixed hyperlipidemia  Comments:  Aggressive risk factor management. 5. Need for hepatitis C screening test  -     HEPATITIS C AB    6. Postmenopausal  -     DEXA BONE DENSITY STUDY AXIAL; Future    7. Encounter for immunization  -     INFLUENZA, FLUZONE HIGH-DOSE, (AGE 72 Y+), IM, PF, 0.7 ML  -     varicella-zoster recombinant, PF, (Shingrix, PF,) 50 mcg/0.5 mL susr injection; 0.5 mL by IntraMUSCular route once for 1 dose. -     pneumococcal 23-valent (PNEUMOVAX 23) 25 mcg/0.5 mL injection; 0.5 mL by IntraMUSCular route once for 1 dose. -     pneumococcal 13 abdifatah conj dip (PREVNAR-13) 0.5 mL syrg injection; 0.5 mL by IntraMUSCular route once for 1 dose. 8. Encounter for screening mammogram for malignant neoplasm of breast  -     ZAC MAMMO BI SCREENING INCL CAD;  Future  -     REFERRAL TO GASTROENTEROLOGY        Follow-up and Dispositions    Return in about 6 months (around 4/6/2023) for F/U: DM, HTN, HLD.          Patricia Scott MD  [address] [place] [address]

## 2022-10-31 ENCOUNTER — OFFICE VISIT (OUTPATIENT)
Dept: ENDOCRINOLOGY | Age: 72
End: 2022-10-31
Payer: MEDICARE

## 2022-10-31 VITALS
BODY MASS INDEX: 37.74 KG/M2 | RESPIRATION RATE: 18 BRPM | SYSTOLIC BLOOD PRESSURE: 161 MMHG | WEIGHT: 213 LBS | OXYGEN SATURATION: 97 % | HEIGHT: 63 IN | HEART RATE: 77 BPM | DIASTOLIC BLOOD PRESSURE: 72 MMHG | TEMPERATURE: 97.3 F

## 2022-10-31 DIAGNOSIS — R74.8 ALKALINE PHOSPHATASE ELEVATION: ICD-10-CM

## 2022-10-31 DIAGNOSIS — I10 ESSENTIAL HYPERTENSION: ICD-10-CM

## 2022-10-31 DIAGNOSIS — R80.1 PERSISTENT PROTEINURIA: ICD-10-CM

## 2022-10-31 DIAGNOSIS — E78.2 MIXED HYPERLIPIDEMIA: ICD-10-CM

## 2022-10-31 DIAGNOSIS — E11.65 TYPE 2 DIABETES MELLITUS WITH HYPERGLYCEMIA, WITHOUT LONG-TERM CURRENT USE OF INSULIN (HCC): Primary | ICD-10-CM

## 2022-10-31 PROCEDURE — G8536 NO DOC ELDER MAL SCRN: HCPCS | Performed by: INTERNAL MEDICINE

## 2022-10-31 PROCEDURE — 1123F ACP DISCUSS/DSCN MKR DOCD: CPT | Performed by: INTERNAL MEDICINE

## 2022-10-31 PROCEDURE — 3051F HG A1C>EQUAL 7.0%<8.0%: CPT | Performed by: INTERNAL MEDICINE

## 2022-10-31 PROCEDURE — G9899 SCRN MAM PERF RSLTS DOC: HCPCS | Performed by: INTERNAL MEDICINE

## 2022-10-31 PROCEDURE — 3078F DIAST BP <80 MM HG: CPT | Performed by: INTERNAL MEDICINE

## 2022-10-31 PROCEDURE — G8427 DOCREV CUR MEDS BY ELIG CLIN: HCPCS | Performed by: INTERNAL MEDICINE

## 2022-10-31 PROCEDURE — 2022F DILAT RTA XM EVC RTNOPTHY: CPT | Performed by: INTERNAL MEDICINE

## 2022-10-31 PROCEDURE — 3074F SYST BP LT 130 MM HG: CPT | Performed by: INTERNAL MEDICINE

## 2022-10-31 PROCEDURE — G8400 PT W/DXA NO RESULTS DOC: HCPCS | Performed by: INTERNAL MEDICINE

## 2022-10-31 PROCEDURE — G8417 CALC BMI ABV UP PARAM F/U: HCPCS | Performed by: INTERNAL MEDICINE

## 2022-10-31 PROCEDURE — 99214 OFFICE O/P EST MOD 30 MIN: CPT | Performed by: INTERNAL MEDICINE

## 2022-10-31 PROCEDURE — 1101F PT FALLS ASSESS-DOCD LE1/YR: CPT | Performed by: INTERNAL MEDICINE

## 2022-10-31 PROCEDURE — G8754 DIAS BP LESS 90: HCPCS | Performed by: INTERNAL MEDICINE

## 2022-10-31 PROCEDURE — 3017F COLORECTAL CA SCREEN DOC REV: CPT | Performed by: INTERNAL MEDICINE

## 2022-10-31 PROCEDURE — G8432 DEP SCR NOT DOC, RNG: HCPCS | Performed by: INTERNAL MEDICINE

## 2022-10-31 PROCEDURE — 1090F PRES/ABSN URINE INCON ASSESS: CPT | Performed by: INTERNAL MEDICINE

## 2022-10-31 PROCEDURE — G8753 SYS BP > OR = 140: HCPCS | Performed by: INTERNAL MEDICINE

## 2022-10-31 NOTE — PATIENT INSTRUCTIONS
SPECIFIC INSTRUCTIONS BELOW     DRINK water   Do not skip meals  Do not eat in between meals    Reduce carbs- pasta, rice, potatoes, bread   Try to avoid processed bread products like BISCUITS, CROISSANTS, MUFFINS    Do not drink juices or sodas, even if they are calorie zero or diet drinks and especially avoid using powders like crystalloids , MARYANN-AIDS     Do not eat peanut butter     Do not eat sugar free cookies and cakes   Do not eat peaches, oranges, pineapples, raisins, grapes , canteloupe , honey dew, mangoes , watermelon  and fruit medleys'      ----------------------------------------------------------------------------------------------------       lipitor at   40 mg at night      Metformin Er 1 gm twice a day with meals       amaryl 4 mg at night time      prandin 2 mg   Three  times a day ( patient reported not taking it )     Stay on  bydureon b-cise  2 mg a  Week      Stay farxiga 5 mg a day before b-fast                -------------PAY ATTENTION TO [address] [place] [address] -----------------      - The medications prescribed at this visit will not be available at pharmacy until 6 pm       - YOUR MED LIST IS NOT UP TO DATE AS SOME CHANGES ARE BEING MADE AFTER THE VISIT - FOLLOW SPECIFIC INSTRUCTIONS  ABOVE     -ANY tests other than blood work, which you opt to do  outside the  [facility] imaging facilities, you are responsible for prior authorizations if  required    - 13 [facility]- PLEASE IGNORE     Results     *Normal results will not be notified by a phone call starting January 1 2021   *If you have an upcoming visit, the results will be discussed at the visit   *Please sign up for MY CHART if you want access to your lab and test results  *Abnormal results which require immediate attention will be notified by phone call   *Abnormal results which do not require immediate assistance will be notified in 1-2 weeks       Refills    -    have your pharmacy send us a refill request . Refills are done max for one year and a visit is a must before refills are extended    Follow up appointments -  highly encourage you to make it when you are checking out. We can accommodate you into the schedule based on your clinical situation, but not for extending refills beyond a year. Labs are important to give refills and is important to get labs before the visit     Phone calls  -  Allow  24 hrs.  for non-urgent calls to be returned  Prior authorization - It may take 2-4 weeks to process  Forms  -  FMLA, DMV etc., will take up to 2 weeks to process  Cancellations - please notify the office 2 days in advance   Samples  - will only be dispensed at visits       If not showing for the appointments and cancelling appointments within 24 hours are kept track of and three  of such situations in  two consecutive years will likely be considered for termination from the practice    -------------------------------------------------------------------------------------------------------------------

## 2022-10-31 NOTE — LETTER
10/31/2022    Patient: Vonnie Quintana   YOB: 1950   Date of Visit: 10/31/2022     Armando Khan MD  [address] [place] [address]  [facility] [place]    Dear Armando Khan MD,      Thank you for referring Ms. Chaitanya Sanchez to [id number] [place] [address] for evaluation. My notes for this consultation are attached. If you have questions, please do not hesitate to call me. I look forward to following your patient along with you.       Sincerely,    Blaise Carter MD

## 2022-10-31 NOTE — PROGRESS NOTES
Leigh Duke is a 67 y.o. female here for   Chief Complaint   Patient presents with    Diabetes       1. Have you been to the ER, urgent care clinic since your last visit? Hospitalized since your last visit? -no    2. Have you seen or consulted any other health care providers outside of the [address] [place] [address] since your last visit? Include any pap smears or colon screening.  Eye doc

## 2022-10-31 NOTE — PROGRESS NOTES
HISTORY OF PRESENT ILLNESS  Noelle Prince is a 67 y.o. female. Patient here for f/u after last  visit of Type 2 diabetes mellitus  From   july 2022       She is doing fine but for right shoulder pain  She is trying to do better with diet       July 2022     She had COVID in the interim   She had gotten antibiotics   Son is diagnosed with colon cancer   She feels sad       Jan 2022     She has done very well   She could not refill starlix       Old history :   Referred : by self/pcp  Pt of Dr. Yovany Loving   He transferred care as Dr. Yovany Loving left   H/o diabetes for 5  years   Current A1C is 6.9 % from sept 2013  and symptoms/problems include none   Current diabetic medications include glyburide 5 mg 2 pills a day  and victoza 1.8      Review of Systems   Constitutional: Negative. Physical Exam   Constitutional: She is oriented to person, place, and time. She appears well-developed and well-nourished. Abdominal: Soft. Bowel sounds are normal.         Lab Results   Component Value Date/Time    Hemoglobin A1c 7.0 (H) 10/24/2022 11:28 AM    Hemoglobin A1c 7.9 (H) 07/20/2022 12:00 AM    Hemoglobin A1c 7.1 (H) 01/19/2022 11:07 AM    Glucose 131 (H) 10/24/2022 11:28 AM    Glucose  05/05/2015 09:18 AM    Microalbumin/Creat ratio (mg/g creat) 121 (H) 10/24/2022 11:28 AM    Microalbumin,urine random 12.00 10/24/2022 11:28 AM    LDL, calculated 90.8 10/24/2022 11:28 AM    Creatinine 1.35 (H) 10/24/2022 11:28 AM      Lab Results   Component Value Date/Time    Cholesterol, total 167 10/24/2022 11:28 AM    HDL Cholesterol 58 10/24/2022 11:28 AM    LDL, calculated 90.8 10/24/2022 11:28 AM    Triglyceride 91 10/24/2022 11:28 AM    CHOL/HDL Ratio 2.9 10/24/2022 11:28 AM     Lab Results   Component Value Date/Time    ALT (SGPT) 52 10/24/2022 11:28 AM    Alk.  phosphatase 222 (H) 10/24/2022 11:28 AM    Bilirubin, total 0.3 10/24/2022 11:28 AM     Lab Results   Component Value Date/Time    GFR est AA 57 (L) 01/19/2022 11:07 AM    GFR est non-AA 50 (L) 01/19/2022 11:07 AM    Creatinine 1.35 (H) 10/24/2022 11:28 AM    BUN 18 10/24/2022 11:28 AM    Sodium 144 10/24/2022 11:28 AM    Potassium 4.8 10/24/2022 11:28 AM    Chloride 110 (H) 10/24/2022 11:28 AM    CO2 27 10/24/2022 11:28 AM              ASSESSMENT and PLAN    1. Type 2 DM uncontrolled : A1c is  7 %   from    October 2022   compared to   7.9 %     from    July 2022   compared to    7.1 %       From  Jan 2022   Compared to    7.9 %     From sept 2021   Compared to    6.8   %   From today march 2021   Compared to  6.8 %    From  Dec 2020     compared to    8.5 %    From sept 2020  by POC      Compared to  7.6 %     From   May 2020   Compared tp      7.6 %     From   Dec 2019     Compared to     7 %     From    May 2019     Compared  To  7.8 %     From   Jan 2019       compared to    7.4 %    FROM OCT 2018    COMPARED   TO 6.9 %     From   June 2018    compared to   7.3 %      From    march 2018 October 2022   Improved  control  from [facility] addition  Stay on  amaryl  4 mg ,  bydureon b-cise, metformin er 1 gm bid  ( patient reported NOT taking prandin)  COULD NOT tolerate jardiance - GAVE  her rash and she had candidiasis            July 2022     Lost control    from diet  non -compliance   Stay on  amaryl  4 mg , prandin tid, bydureon b-cise, metformin er 1 gm bid  ( NOT taking prandin with b-fast and lunch  )   COULD NOT tolerate jardiance - GAVE  her rash and she had candidiasis   Will try farxiga 5 mg a day           2. Hypoglycemia :  Educated on treating the hypoglycemia. 3. Elevated alk phos -  In presence of good glycemic control  -  fluctuating   Trending up   Discussed  usg abd - ordered today again - unclear of the cause   normal  bone iso-enzymes       4. Htn : on norvasc and lisinopril       5. Hyperlipidemia : on lipitor       6.  CKD - follows up with Dr. Alba Leonard - on sodabicarb         Reviewed results with patient and discussed the labs being ordered today/bnv  Patient voiced understanding of plan of care

## 2022-11-08 RX ORDER — LISINOPRIL 10 MG/1
TABLET ORAL
Qty: 90 TABLET | Refills: 3 | Status: SHIPPED | OUTPATIENT
Start: 2022-11-08

## 2022-11-09 ENCOUNTER — HOSPITAL ENCOUNTER (OUTPATIENT)
Dept: MAMMOGRAPHY | Age: 72
Discharge: HOME | End: 2022-11-09
Attending: FAMILY MEDICINE
Payer: MEDICARE

## 2022-11-09 ENCOUNTER — HOSPITAL ENCOUNTER (OUTPATIENT)
Dept: ULTRASOUND IMAGING | Age: 72
Discharge: HOME | End: 2022-11-09
Attending: INTERNAL MEDICINE
Payer: MEDICARE

## 2022-11-09 DIAGNOSIS — E78.2 MIXED HYPERLIPIDEMIA: ICD-10-CM

## 2022-11-09 DIAGNOSIS — E11.65 TYPE 2 DIABETES MELLITUS WITH HYPERGLYCEMIA, WITHOUT LONG-TERM CURRENT USE OF INSULIN (HCC): ICD-10-CM

## 2022-11-09 DIAGNOSIS — R74.8 ALKALINE PHOSPHATASE ELEVATION: ICD-10-CM

## 2022-11-09 DIAGNOSIS — Z78.0 POSTMENOPAUSAL: ICD-10-CM

## 2022-11-09 DIAGNOSIS — Z12.31 ENCOUNTER FOR SCREENING MAMMOGRAM FOR MALIGNANT NEOPLASM OF BREAST: ICD-10-CM

## 2022-11-09 PROCEDURE — 77080 DXA BONE DENSITY AXIAL: CPT

## 2022-11-09 PROCEDURE — 76700 US EXAM ABDOM COMPLETE: CPT

## 2022-11-09 PROCEDURE — 77063 BREAST TOMOSYNTHESIS BI: CPT

## 2022-11-09 RX ORDER — ATORVASTATIN CALCIUM 40 MG/1
TABLET, FILM COATED ORAL
Qty: 90 TABLET | Refills: 4 | Status: SHIPPED | OUTPATIENT
Start: 2022-11-09

## 2022-11-15 RX ORDER — GLIMEPIRIDE 4 MG/1
TABLET ORAL
Qty: 90 TABLET | Refills: 2 | Status: SHIPPED | OUTPATIENT
Start: 2022-11-15

## 2022-12-14 ENCOUNTER — PATIENT MESSAGE (OUTPATIENT)
Dept: PRIMARY CARE CLINIC | Age: 72
End: 2022-12-14

## 2023-04-06 ENCOUNTER — OFFICE VISIT (OUTPATIENT)
Dept: PRIMARY CARE CLINIC | Age: 73
End: 2023-04-06

## 2023-04-06 PROBLEM — N18.30 CHRONIC RENAL DISEASE, STAGE III (HCC): Status: ACTIVE | Noted: 2023-04-06

## 2023-04-06 PROBLEM — E66.01 SEVERE OBESITY (BMI 35.0-39.9) WITH COMORBIDITY (HCC): Status: ACTIVE | Noted: 2018-03-26

## 2023-04-06 NOTE — PROGRESS NOTES
HPI     Chief Complaint   Patient presents with    Follow Up Chronic Condition    Rash     Top of head and chest area        HPI:  Marisel Pittman is a 67 y.o. female who has a history of T2DM, HTN, HLD, Obesity. Rash: Patient has noticed an itchy moist rash under her breast for several weeks. She also has noticed very itchy skin which she has attributed to possibly increased sweating given the warm weather. She does exfoliate several times a week. She also notices a circular itchy lesion in her scalp that is not really tender and nondraining. She does not use chemicals in her hair and there has been no trauma to the scalp. T2DM:  on glimepiride 4mg nightly, Bydureon weekly, and metformin 1000mg BID. Glucometer readings not brought in today. Lab Results   Component Value Date/Time    Hemoglobin A1c 7.0 (H) 10/24/2022 11:28 AM    Hemoglobin A1c (POC) 6.8 03/15/2021 12:08 PM      Diabetes Checklist  ACE inhibitor: lisinopril 10mg  Last Lipid panel: see below  Statin: atorvastatin 40mg  Last Microalbumin: reviewed  Pneumonia vaccine 19-64?: discussed  Last seen by opthalmology: reviewed    HTN: Compliant with lisinopril 10mg and amlodipine 10mg daily. No dizziness. HLD: Compliant with atorvastatin 40mg nightly, no myalgias. Lab Results   Component Value Date/Time    Cholesterol, total 167 10/24/2022 11:28 AM    HDL Cholesterol 58 10/24/2022 11:28 AM    LDL, calculated 90.8 10/24/2022 11:28 AM    VLDL, calculated 18.2 10/24/2022 11:28 AM    Triglyceride 91 10/24/2022 11:28 AM    CHOL/HDL Ratio 2.9 10/24/2022 11:28 AM          Allergies   Allergen Reactions    Ciprofloxacin Rash    Januvia [Sitagliptin] Hives     Stopped for bad rash       Current Outpatient Medications   Medication Sig    nystatin (MYCOSTATIN) powder Apply  to affected area three (3) times daily. griseofulvin (GRIFULVIN V) 500 mg tab tablet Take 1 Tablet by mouth daily for 30 days.     hydrOXYzine HCL (ATARAX) 25 mg tablet Take 1 Tablet by mouth two (2) times daily as needed for Itching for up to 10 days. glimepiride (AMARYL) 4 mg tablet TAKE 1 TABLET BY MOUTH EVERY DAY AT NIGHT    atorvastatin (LIPITOR) 40 mg tablet TAKE 1 TABLET BY MOUTH NIGHTLY    lisinopriL (PRINIVIL, ZESTRIL) 10 mg tablet TAKE 1 TABLET BY MOUTH EVERY DAY    sodium bicarbonate 650 mg tablet Take 650 mg by mouth two (2) times a day. exenatide microspheres (Bydureon BCise) 2 mg/0.85 mL atIn 2  mg   once weekly    amLODIPine (NORVASC) 10 mg tablet Take 1 Tablet by mouth in the morning. dapagliflozin (Farxiga) 5 mg tab tablet One pill before b-fast    metFORMIN ER (GLUCOPHAGE XR) 500 mg tablet TAKE 2 TABS BY MOUTH TWO (2) TIMES DAILY (WITH MEALS). STOP GLUMETZA    glucose blood VI test strips (CONTOUR NEXT TEST STRIPS) strip USE AS DIRECTED TWICE A DAY. Dx code E11.65     No current facility-administered medications for this visit. Review of Systems   Constitutional:  Negative for chills and fever. Respiratory:  Negative for cough and shortness of breath. Cardiovascular:  Negative for chest pain and palpitations. Skin:  Positive for itching and rash. Psychiatric/Behavioral:  Negative for hallucinations and substance abuse. Reviewed PmHx, FmHx, SocHx as well as meds and allergies, updated and dated in the chart. Objective     Visit Vitals  /79 (BP 1 Location: Left upper arm, BP Patient Position: Sitting, BP Cuff Size: Large adult)   Pulse 86   Temp 97.4 °F (36.3 °C) (Tympanic)   Resp 20   Ht 5' 3\" (1.6 m)   Wt 213 lb 3.2 oz (96.7 kg)   SpO2 98%   BMI 37.77 kg/m²     Physical Exam  Vitals and nursing note reviewed. Constitutional:       Appearance: Normal appearance. Cardiovascular:      Rate and Rhythm: Normal rate and regular rhythm. Heart sounds: Normal heart sounds. Pulmonary:      Effort: Pulmonary effort is normal. No respiratory distress. Breath sounds: Normal breath sounds.    Skin:     General: Skin is warm.      Coloration: Skin is not jaundiced. Comments: Well-circumscribed circular lesion with scant hair loss and short hair strands with yellow waxy appearing scale located to the left side of head    Macerated macules under both breasts with mild erythema. No foul odor. Not warm to the touch. Satellite lesions noted. Neurological:      General: No focal deficit present. Mental Status: She is alert and oriented to person, place, and time. Assessment and Plan     Diagnoses and all orders for this visit:    1. Allergy, initial encounter  Comments:  Pt to use hydroxyzine prn itching. Advised it can be sedating. Orders:  -     hydrOXYzine HCL (ATARAX) 25 mg tablet; Take 1 Tablet by mouth two (2) times daily as needed for Itching for up to 10 days. 2. Type 2 diabetes mellitus with hyperglycemia, without long-term current use of insulin (HCC)  Comments:  Last A1c right at goal. Upcoming labs and appt with Dr. Josh Camara. Continue current regimen and aim for A1c <7.0%    3. Stage 3a chronic kidney disease ([facility] [address]  Comments:  Stable. Manage HTN and DM. Continue to monitor on routine labs. 4. Severe obesity (BMI 35.0-39. 9) with comorbidity ([address]  Comments:  Healthy lifestyle encouraged. 5. Dyslipidemia  Comments:  Continue atorvastatin 40mg nightly. 6. Candidal intertrigo  Comments:  New. Respecters include diabetes and hyperhidrosis. Nystatin powder topically 3 times daily. Orders:  -     nystatin (MYCOSTATIN) powder; Apply  to affected area three (3) times daily. 7. Tinea capitis  Comments:  Exam suggestive of tinea capitis. We will treat with griseofulvin for 1 month. Close follow-up and return sooner INI. Orders:  -     griseofulvin (GRIFULVIN V) 500 mg tab tablet; Take 1 Tablet by mouth daily for 30 days. As applicable:  Medication Side Effects and Warnings were discussed with patient. Patient Labs were reviewed and or requested.   Patient Past Records were reviewed and or requested. Follow-up and Dispositions    Return in about 1 month (around 5/6/2023) for Rash follow up. I have discussed the diagnosis with the patient and the intended plan as seen in the above orders. The patient has received an after-visit summary and questions were answered concerning future plans. I have discussed medication side effects and warnings with the patient as well.       Jon Escobar MD  [address] [place] [address]

## 2023-04-06 NOTE — PROGRESS NOTES
Identified Patient with two Patient identifiers(name and ). 1. \"Have you been to the ER, urgent care clinic since your last visit? Hospitalized since your last visit? \" No    2. \"Have you seen or consulted any other health care providers outside of the [address] [place] [address] since your last visit? \" No     3. For patients aged 39-70: Has the patient had a colonoscopy / FIT/ Cologuard? Yes - Care Gap present. Most recent result on file      If the patient is female:    4. For patients aged 41-77: Has the patient had a mammogram within the past 2 years? Yes - Care Gap present. Most recent result on file      5. For patients aged 21-65: Has the patient had a pap smear? NA - based on age or sex     Visit Vitals  /79 (BP 1 Location: Left upper arm, BP Patient Position: Sitting, BP Cuff Size: Large adult)   Pulse 86   Temp 97.4 °F (36.3 °C) (Tympanic)   Resp 20   Ht 5' 3\" (1.6 m)   Wt 213 lb 3.2 oz (96.7 kg)   SpO2 98%   BMI 37.77 kg/m²       Chief Complaint   Patient presents with    Follow Up Chronic Condition    Rash     Top of head and chest area       Health Maintenance Due   Topic Date Due    Hepatitis C Screening  Never done    Pneumococcal 65+ years (1 - PCV) Never done    Shingles Vaccine (1 of 2) Never done    Eye Exam Retinal or Dilated  2020    DTaP/Tdap/Td series (2 - Td or Tdap) 2020    Foot Exam Q1  2021    COVID-19 Vaccine (5 - Booster for Pfizer series) 2022          No questionnaires available.

## 2023-04-28 ENCOUNTER — OFFICE VISIT (OUTPATIENT)
Dept: PRIMARY CARE CLINIC | Age: 73
End: 2023-04-28
Payer: MEDICARE

## 2023-04-28 VITALS
SYSTOLIC BLOOD PRESSURE: 137 MMHG | HEIGHT: 63 IN | BODY MASS INDEX: 37.7 KG/M2 | DIASTOLIC BLOOD PRESSURE: 64 MMHG | RESPIRATION RATE: 21 BRPM | HEART RATE: 75 BPM | WEIGHT: 212.8 LBS | OXYGEN SATURATION: 99 % | TEMPERATURE: 97.7 F

## 2023-04-28 DIAGNOSIS — B37.2 CANDIDAL INTERTRIGO: ICD-10-CM

## 2023-04-28 DIAGNOSIS — R23.8 DRY SCALP: Primary | ICD-10-CM

## 2023-04-28 PROCEDURE — G8510 SCR DEP NEG, NO PLAN REQD: HCPCS | Performed by: FAMILY MEDICINE

## 2023-04-28 PROCEDURE — 99213 OFFICE O/P EST LOW 20 MIN: CPT | Performed by: FAMILY MEDICINE

## 2023-04-28 PROCEDURE — G8536 NO DOC ELDER MAL SCRN: HCPCS | Performed by: FAMILY MEDICINE

## 2023-04-28 PROCEDURE — 1090F PRES/ABSN URINE INCON ASSESS: CPT | Performed by: FAMILY MEDICINE

## 2023-04-28 PROCEDURE — 3017F COLORECTAL CA SCREEN DOC REV: CPT | Performed by: FAMILY MEDICINE

## 2023-04-28 PROCEDURE — G8427 DOCREV CUR MEDS BY ELIG CLIN: HCPCS | Performed by: FAMILY MEDICINE

## 2023-04-28 PROCEDURE — 1101F PT FALLS ASSESS-DOCD LE1/YR: CPT | Performed by: FAMILY MEDICINE

## 2023-04-28 PROCEDURE — 3075F SYST BP GE 130 - 139MM HG: CPT | Performed by: FAMILY MEDICINE

## 2023-04-28 PROCEDURE — G8417 CALC BMI ABV UP PARAM F/U: HCPCS | Performed by: FAMILY MEDICINE

## 2023-04-28 PROCEDURE — G8399 PT W/DXA RESULTS DOCUMENT: HCPCS | Performed by: FAMILY MEDICINE

## 2023-04-28 PROCEDURE — G9899 SCRN MAM PERF RSLTS DOC: HCPCS | Performed by: FAMILY MEDICINE

## 2023-04-28 PROCEDURE — 3078F DIAST BP <80 MM HG: CPT | Performed by: FAMILY MEDICINE

## 2023-04-28 PROCEDURE — 1123F ACP DISCUSS/DSCN MKR DOCD: CPT | Performed by: FAMILY MEDICINE

## 2023-04-28 RX ORDER — NYSTATIN 100000 [USP'U]/G
POWDER TOPICAL 3 TIMES DAILY
Qty: 60 G | Refills: 0 | Status: SHIPPED | OUTPATIENT
Start: 2023-04-28

## 2023-04-28 NOTE — PATIENT INSTRUCTIONS
Use Bhavna's Daughter Black Tea and vanilla shampoo and conditioner twice a month. Use plain olive oil or coconut oil on scalp twice a week. OR Bhavna[employer] Daughter hair oil.

## 2023-04-28 NOTE — PROGRESS NOTES
Visit Vitals  /64 (BP 1 Location: Left upper arm, BP Patient Position: Sitting, BP Cuff Size: Large adult)   Pulse 75   Temp 97.7 °F (36.5 °C) (Temporal)   Resp 21   Ht 5' 3\" (1.6 m)   Wt 212 lb 12.8 oz (96.5 kg)   SpO2 99%   BMI 37.70 kg/m²     No chief complaint on file. 1. \"Have you been to the ER, urgent care clinic since your last visit? Hospitalized since your last visit? \" No    2. \"Have you seen or consulted any other health care providers outside of the [address] [place] [address] since your last visit? \"  Yes/Opthamologist      3. For patients aged 39-70: Has the patient had a colonoscopy / FIT/ Cologuard? Yes - Care Gap present. Most recent result on file      If the patient is female:    4. For patients aged 41-77: Has the patient had a mammogram within the past 2 years? Yes - Care Gap present. Most recent result on file      5. For patients aged 21-65: Has the patient had a pap smear?  NA - based on age or sex

## 2023-04-28 NOTE — PROGRESS NOTES
HPI     No chief complaint on file. HPI:  Marisel Pittman is a 67 y.o. female who  has a history of T2DM, HTN, HLD, Obesity. Scalp concerns: initially with several weeks of patchy hair loss and dry scalp. Tinea capitis suspected. Hair is now thicker per patient and her stylist, no upset stomach. She continues to have dry itchy scalp. She washes hair once monthly and has tried tea tree oil. Candidal intertrigo: itchy rash under breasts that resolves with nystatin powder but will return if discontinued for some days. Allergies   Allergen Reactions    Ciprofloxacin Rash    Januvia [Sitagliptin] Hives     Stopped for bad rash       Current Outpatient Medications   Medication Sig    nystatin (MYCOSTATIN) powder Apply  to affected area three (3) times daily. griseofulvin (GRIFULVIN V) 500 mg tab tablet Take 1 Tablet by mouth daily for 30 days. glimepiride (AMARYL) 4 mg tablet TAKE 1 TABLET BY MOUTH EVERY DAY AT NIGHT    atorvastatin (LIPITOR) 40 mg tablet TAKE 1 TABLET BY MOUTH NIGHTLY    lisinopriL (PRINIVIL, ZESTRIL) 10 mg tablet TAKE 1 TABLET BY MOUTH EVERY DAY    sodium bicarbonate 650 mg tablet Take 1 Tablet by mouth two (2) times a day. exenatide microspheres (Bydureon BCise) 2 mg/0.85 mL atIn 2  mg   once weekly    amLODIPine (NORVASC) 10 mg tablet Take 1 Tablet by mouth in the morning. dapagliflozin (Farxiga) 5 mg tab tablet One pill before b-fast    metFORMIN ER (GLUCOPHAGE XR) 500 mg tablet TAKE 2 TABS BY MOUTH TWO (2) TIMES DAILY (WITH MEALS). STOP GLUMETZA    glucose blood VI test strips (CONTOUR NEXT TEST STRIPS) strip USE AS DIRECTED TWICE A DAY. Dx code E11.65     No current facility-administered medications for this visit. Review of Systems   Skin:  Positive for itching and rash. Reviewed PmHx, FmHx, SocHx as well as meds and allergies, updated and dated in the chart.          Objective     Visit Vitals  /64 (BP 1 Location: Left upper arm, BP Patient Position: Sitting, BP Cuff Size: Large adult)   Pulse 75   Temp 97.7 °F (36.5 °C) (Temporal)   Resp 21   Ht 5' 3\" (1.6 m)   Wt 212 lb 12.8 oz (96.5 kg)   SpO2 99%   BMI 37.70 kg/m²     Physical Exam  Vitals and nursing note reviewed. Constitutional:       General: She is not in acute distress. HENT:      Head: Normocephalic and atraumatic. Pulmonary:      Effort: Pulmonary effort is normal. No respiratory distress. Skin:     Comments: Moist skin with satellite lesions under breasts bilaterally. Scalp dry without short hair strands or waxy appearance. Neurological:      Mental Status: She is alert. Assessment and Plan     Diagnoses and all orders for this visit:    1. Dry scalp  Comments:  Stop antifungal, LFTs stable on recent check. Use moisturizing hair shampoo and conditioner. Avoid parabans,sulfates, chemicals. 2. Candidal intertrigo  Comments:  Intermittent and improves with nystatin. Can use cornstarch powder to help keep skin dry after antifungal use. Needs to avoid moisture in area. Orders:  -     nystatin (MYCOSTATIN) powder; Apply  to affected area three (3) times daily. As applicable:  Medication Side Effects and Warnings were discussed with patient. Patient Labs were reviewed and or requested. Patient Past Records were reviewed and or requested. I have discussed the diagnosis with the patient and the intended plan as seen in the above orders. The patient has received an after-visit summary and questions were answered concerning future plans. I have discussed medication side effects and warnings with the patient as well.       Wei Maxwell MD  [address] [place] [address]

## 2023-04-28 NOTE — PROGRESS NOTES
HPI     No chief complaint on file. HPI:  Arabella Lebron is a 67 y.o. female who has a history of T2DM, HTN, HLD, Obesity. Rash:patient had dry hair and patchy loss for weeks. I started her on griseofulvin and she's noticed improvement but still has dry hair and scalp. She's using Olive Oil hair products and tried tea tree oil without much improvement. Allergies   Allergen Reactions    Ciprofloxacin Rash    Januvia [Sitagliptin] Hives     Stopped for bad rash       Current Outpatient Medications   Medication Sig    nystatin (MYCOSTATIN) powder Apply  to affected area three (3) times daily. griseofulvin (GRIFULVIN V) 500 mg tab tablet Take 1 Tablet by mouth daily for 30 days. glimepiride (AMARYL) 4 mg tablet TAKE 1 TABLET BY MOUTH EVERY DAY AT NIGHT    atorvastatin (LIPITOR) 40 mg tablet TAKE 1 TABLET BY MOUTH NIGHTLY    lisinopriL (PRINIVIL, ZESTRIL) 10 mg tablet TAKE 1 TABLET BY MOUTH EVERY DAY    sodium bicarbonate 650 mg tablet Take 1 Tablet by mouth two (2) times a day. exenatide microspheres (Bydureon BCise) 2 mg/0.85 mL atIn 2  mg   once weekly    amLODIPine (NORVASC) 10 mg tablet Take 1 Tablet by mouth in the morning. dapagliflozin (Farxiga) 5 mg tab tablet One pill before b-fast    metFORMIN ER (GLUCOPHAGE XR) 500 mg tablet TAKE 2 TABS BY MOUTH TWO (2) TIMES DAILY (WITH MEALS). STOP GLUMETZA    glucose blood VI test strips (CONTOUR NEXT TEST STRIPS) strip USE AS DIRECTED TWICE A DAY. Dx code E11.65     No current facility-administered medications for this visit. ROS    Reviewed PmHx, FmHx, SocHx as well as meds and allergies, updated and dated in the chart.          Objective     Visit Vitals  /64 (BP 1 Location: Left upper arm, BP Patient Position: Sitting, BP Cuff Size: Large adult)   Pulse 75   Temp 97.7 °F (36.5 °C) (Temporal)   Resp 21   Ht 5' 3\" (1.6 m)   Wt 212 lb 12.8 oz (96.5 kg)   SpO2 99%   BMI 37.70 kg/m²     Physical Exam        Assessment and Plan     Diagnoses and all orders for this visit:    1. Dry scalp  Comments:  Stop antifungal. Use moisturizing hair shampoo and conditioner. Avoid parabans,sulfates, chemicals. 2. Candidal intertrigo  Comments:  Intermittent and improves with nystatin. Orders:  -     nystatin (MYCOSTATIN) powder; Apply  to affected area three (3) times daily. As applicable:  Medication Side Effects and Warnings were discussed with patient. Patient Labs were reviewed and or requested. Patient Past Records were reviewed and or requested. I have discussed the diagnosis with the patient and the intended plan as seen in the above orders. The patient has received an after-visit summary and questions were answered concerning future plans. I have discussed medication side effects and warnings with the patient as well.       Hosea Lopez MD  [address] [place] [address]

## 2023-05-01 ENCOUNTER — OFFICE VISIT (OUTPATIENT)
Dept: ENDOCRINOLOGY | Age: 73
End: 2023-05-01
Payer: MEDICARE

## 2023-05-01 VITALS
HEIGHT: 63 IN | WEIGHT: 212 LBS | SYSTOLIC BLOOD PRESSURE: 159 MMHG | OXYGEN SATURATION: 98 % | HEART RATE: 70 BPM | TEMPERATURE: 97.6 F | RESPIRATION RATE: 18 BRPM | BODY MASS INDEX: 37.56 KG/M2 | DIASTOLIC BLOOD PRESSURE: 82 MMHG

## 2023-05-01 DIAGNOSIS — E78.2 MIXED HYPERLIPIDEMIA: ICD-10-CM

## 2023-05-01 DIAGNOSIS — E11.65 TYPE 2 DIABETES MELLITUS WITH HYPERGLYCEMIA, WITHOUT LONG-TERM CURRENT USE OF INSULIN (HCC): Primary | ICD-10-CM

## 2023-05-01 DIAGNOSIS — I10 ESSENTIAL HYPERTENSION, BENIGN: ICD-10-CM

## 2023-05-01 DIAGNOSIS — I10 ESSENTIAL HYPERTENSION: ICD-10-CM

## 2023-05-01 DIAGNOSIS — R80.1 PERSISTENT PROTEINURIA: ICD-10-CM

## 2023-05-01 PROCEDURE — 3078F DIAST BP <80 MM HG: CPT | Performed by: INTERNAL MEDICINE

## 2023-05-01 PROCEDURE — 3051F HG A1C>EQUAL 7.0%<8.0%: CPT | Performed by: INTERNAL MEDICINE

## 2023-05-01 PROCEDURE — 3077F SYST BP >= 140 MM HG: CPT | Performed by: INTERNAL MEDICINE

## 2023-05-01 PROCEDURE — 1101F PT FALLS ASSESS-DOCD LE1/YR: CPT | Performed by: INTERNAL MEDICINE

## 2023-05-01 PROCEDURE — G8417 CALC BMI ABV UP PARAM F/U: HCPCS | Performed by: INTERNAL MEDICINE

## 2023-05-01 PROCEDURE — G8536 NO DOC ELDER MAL SCRN: HCPCS | Performed by: INTERNAL MEDICINE

## 2023-05-01 PROCEDURE — G9899 SCRN MAM PERF RSLTS DOC: HCPCS | Performed by: INTERNAL MEDICINE

## 2023-05-01 PROCEDURE — 99214 OFFICE O/P EST MOD 30 MIN: CPT | Performed by: INTERNAL MEDICINE

## 2023-05-01 PROCEDURE — G8399 PT W/DXA RESULTS DOCUMENT: HCPCS | Performed by: INTERNAL MEDICINE

## 2023-05-01 PROCEDURE — 1090F PRES/ABSN URINE INCON ASSESS: CPT | Performed by: INTERNAL MEDICINE

## 2023-05-01 PROCEDURE — 1123F ACP DISCUSS/DSCN MKR DOCD: CPT | Performed by: INTERNAL MEDICINE

## 2023-05-01 PROCEDURE — 3017F COLORECTAL CA SCREEN DOC REV: CPT | Performed by: INTERNAL MEDICINE

## 2023-05-01 PROCEDURE — G8510 SCR DEP NEG, NO PLAN REQD: HCPCS | Performed by: INTERNAL MEDICINE

## 2023-05-01 PROCEDURE — 2022F DILAT RTA XM EVC RTNOPTHY: CPT | Performed by: INTERNAL MEDICINE

## 2023-05-01 PROCEDURE — G8427 DOCREV CUR MEDS BY ELIG CLIN: HCPCS | Performed by: INTERNAL MEDICINE

## 2023-05-01 RX ORDER — AMLODIPINE BESYLATE 10 MG/1
10 TABLET ORAL DAILY
Qty: 90 TABLET | Refills: 4 | Status: SHIPPED | OUTPATIENT
Start: 2023-05-01

## 2023-05-01 RX ORDER — FLASH GLUCOSE SENSOR
KIT MISCELLANEOUS
Qty: 2 KIT | Refills: 3 | Status: SHIPPED | OUTPATIENT
Start: 2023-05-01

## 2023-05-01 RX ORDER — METFORMIN HYDROCHLORIDE 500 MG/1
TABLET, EXTENDED RELEASE ORAL
Qty: 360 TABLET | Refills: 3 | Status: SHIPPED | OUTPATIENT
Start: 2023-05-01

## 2023-05-01 RX ORDER — GLIMEPIRIDE 4 MG/1
4 TABLET ORAL
Qty: 90 TABLET | Refills: 2 | Status: SHIPPED | OUTPATIENT
Start: 2023-05-01

## 2023-05-01 NOTE — PROGRESS NOTES
Room 3    Identified pt with two pt identifiers(name and ). Reviewed record in preparation for visit and have obtained necessary documentation. All patient medications has been reviewed. Chief Complaint   Patient presents with    Diabetes     6 month       3 most recent PHQ Screens 2023   Little interest or pleasure in doing things Not at all   Feeling down, depressed, irritable, or hopeless Not at all   Total Score PHQ 2 0         Health Maintenance Review: Patient reminded of \"due or due soon\" health maintenance. I have asked the patient to contact his/her primary care provider (PCP) for follow-up on his/her health maintenance. Vitals:    23 0852 23 0904   BP: (!) 154/70 (!) 159/82   Pulse: 70    Resp: 18    Temp: 97.6 °F (36.4 °C)    TempSrc: Temporal    SpO2: 98%    Weight: 212 lb (96.2 kg)    Height: 5' 3\" (1.6 m)    PainSc:   0 - No pain          Lab Results   Component Value Date/Time    Hemoglobin A1c 7.5 (H) 2023 09:10 AM    Hemoglobin A1c (POC) 6.8 03/15/2021 12:08 PM       Coordination of Care Questionnaire:   1) Have you been to an emergency room, urgent care, or hospitalized since your last visit?   no       2. Have seen or consulted any other health care provider since your last visit? YES, seen by  Opthalmology last week. Patient is accompanied by self I have received verbal consent from Jesse Knox to discuss any/all medical information while they are present in the room.

## 2023-05-01 NOTE — PATIENT INSTRUCTIONS
SPECIFIC INSTRUCTIONS BELOW     DRINK water   Do not skip meals  Do not eat in between meals    Reduce carbs- pasta, rice, potatoes, bread   Try to avoid processed bread products like BISCUITS, CROISSANTS, MUFFINS    Do not drink juices or sodas, even if they are calorie zero or diet drinks and especially avoid using powders like crystalloids , MARYANN-AIDS     Do not eat peanut butter     Do not eat sugar free cookies and cakes   Do not eat peaches, oranges, pineapples, raisins, grapes , canteloupe , honey dew, mangoes , watermelon  and fruit medleys'      ----------------------------------------------------------------------------------------------------       lipitor at   40 mg at night      Metformin Er 1 gm twice a day with meals       amaryl 4 mg at night time      prandin 2 mg   Three  times a day ( patient reported not taking it )     Stay on  bydureon b-cise  2 mg a  Week      Stay farxiga 5 mg a day before b-fast   ---------------------------------------------------------------------------                       -------------PAY ATTENTION TO THESE GENERAL INSTRUCTIONS -----------------      - The medications prescribed at this visit will not be available at pharmacy until 6 pm       - YOUR MED LIST IS NOT UP TO DATE AS SOME CHANGES ARE BEING MADE AFTER THE VISIT - FOLLOW SPECIFIC INSTRUCTIONS  ABOVE     -ANY tests other than blood work, which you opt to do  outside the  [facility] imaging facilities, you are responsible for prior authorizations if  required    - [address] [phone]- PLEASE IGNORE     Results     *Normal results will not be notified by a phone call starting January 1 2021   *If you have an upcoming visit, the results will be discussed at the visit   *Please sign up for MY CHART if you want access to your lab and test results  *Abnormal results which require immediate attention will be notified by phone call   *Abnormal results which do not require immediate assistance will be notified in 1-2 weeks       Refills    -    have your pharmacy send us a refill request . Refills are done max for one year and a visit is a must before refills are extended    Follow up appointments -  highly encourage you to make it when you are checking out. We can accommodate you into the schedule based on your clinical situation, but not for extending refills beyond a year. Labs are important to give refills and is important to get labs before the visit     Phone calls  -  Allow  24 hrs.  for non-urgent calls to be returned  Prior authorization - It may take 2-4 weeks to process  Forms  -  FMLA, DMV etc., will take up to 2 weeks to process  Cancellations - please notify the office 2 days in advance   Samples  - will only be dispensed at visits       If not showing for the appointments and cancelling appointments within 24 hours are kept track of and three  of such situations in  two consecutive years will likely be considered for termination from the practice    -------------------------------------------------------------------------------------------------------------------

## 2023-05-01 NOTE — PROGRESS NOTES
Daisy Link MD FACE     HISTORY OF PRESENT ILLNESS  Checo Mccallum is a 67 y.o. female. Patient here for f/u after last  visit of Type 2 diabetes mellitus  From   october 2022     She could not afford bydureon briefly   She saw opthal and pcp in the interim   Could nto get dilated exam because of  redness in the eye   She got anti-fungal cream for skin rash         October 2022     She is doing fine but for right shoulder pain  She is trying to do better with diet       July 2022     She had COVID in the interim   She had gotten antibiotics   Son is diagnosed with colon cancer   She feels sad           Old history :   Referred : by self/pcp  Pt of Dr. Edu Urrutia   He transferred care as Dr. Edu Urrutia left   H/o diabetes for 5  years   Current A1C is 6.9 % from sept 2013  and symptoms/problems include none   Current diabetic medications include glyburide 5 mg 2 pills a day  and victoza 1.8      Review of Systems   Constitutional: RASH      Physical Exam   Constitutional: She is oriented to person, place, and time. She appears well-developed and well-nourished. Abdominal: Soft.  Bowel sounds are normal.         Lab Results   Component Value Date/Time    Hemoglobin A1c 7.5 (H) 04/24/2023 09:10 AM    Hemoglobin A1c 7.0 (H) 10/24/2022 11:28 AM    Hemoglobin A1c 7.9 (H) 07/20/2022 12:00 AM    Glucose 156 (H) 04/24/2023 09:10 AM    Glucose  05/05/2015 09:18 AM    Microalbumin/Creat ratio (mg/g creat) 85 (H) 04/24/2023 09:10 AM    Microalbumin,urine random 10.10 04/24/2023 09:10 AM    LDL, calculated 78.2 04/24/2023 09:10 AM    Creatinine 1.43 (H) 04/24/2023 09:10 AM      Lab Results   Component Value Date/Time    Cholesterol, total 153 04/24/2023 09:10 AM    HDL Cholesterol 57 04/24/2023 09:10 AM    LDL, calculated 78.2 04/24/2023 09:10 AM    Triglyceride 89 04/24/2023 09:10 AM    CHOL/HDL Ratio 2.7 04/24/2023 09:10 AM     Lab Results   Component Value Date/Time    ALT (SGPT) 20 04/24/2023 09:10 AM    Alk. phosphatase 171 (H) 04/24/2023 09:10 AM    Bilirubin, total 0.3 04/24/2023 09:10 AM     Lab Results   Component Value Date/Time    GFR est AA 57 (L) 01/19/2022 11:07 AM    GFR est non-AA 50 (L) 01/19/2022 11:07 AM    Creatinine 1.43 (H) 04/24/2023 09:10 AM    BUN 19 04/24/2023 09:10 AM    Sodium 140 04/24/2023 09:10 AM    Potassium 4.6 04/24/2023 09:10 AM    Chloride 113 (H) 04/24/2023 09:10 AM    CO2 23 04/24/2023 09:10 AM          ASSESSMENT and PLAN    1. Type 2 DM uncontrolled : A1c is  7.5 %    from  April 2023    compared to  7 %   from    October 2022   compared to   7.9 %     from    July 2022   compared to    7.1 %       From  Jan 2022   Compared to    7.9 %     From sept 2021   Compared to    6.8   %   From today march 2021   Compared to  6.8 %    From  Dec 2020     compared to    8.5 %    From sept 2020  by POC      Compared to  7.6 %     From   May 2020   Compared tp      7.6 %     From   Dec 2019     Compared to     7 %     From    May 2019     Compared  To  7.8 %     From   Jan 2019       compared to    7.4 %    FROM OCT 2018    COMPARED   TO 6.9 %     From   June 2018    compared to   7.3 %      From    march 2018            May 2023     Slight loss of control ( no meter )  she is on farxiga   (  ?  Skin rash  from candida  )  I could not comment on control  - lack of meter   Stay on  amaryl  4 mg ,  bydureon b-cise  ( missing it by cost ), metformin er 1 gm bid   and farxiga     Discussed use of Lacangeli Elijah briefly so she connects to food  well ( agrees to eating oranges )      October 2022   Improved  control  from [facility] addition  Stay on  amaryl  4 mg ,  bydureon b-cise, metformin er 1 gm bid  ( patient reported NOT taking prandin)  COULD NOT tolerate jardiance - GAVE  her rash and she had candidiasis          July 2022     Lost control    from diet  non -compliance   Stay on  amaryl  4 mg , prandin tid, bydureon b-cise, metformin er 1 gm bid  ( NOT taking prandin with b-fast and lunch  )   COULD NOT tolerate jardiance - GAVE  her rash and she had candidiasis   Will try farxiga 5 mg a day           2. Hypoglycemia :  Educated on treating the hypoglycemia. 3. Elevated alk phos -  In presence of good glycemic control  -  fluctuating   Trending up   Discussed  usg abd - ordered today again - unclear of the cause   normal  bone iso-enzymes       4. Htn : on norvasc and lisinopril       5. Hyperlipidemia : on lipitor       6. CKD - follows up with Dr. Slim Dave - on sodabicarb       7.  Suggesting seeing Dr. Tracy Cohen         Reviewed results with patient and discussed the labs being ordered today/bnv  Patient voiced understanding of plan of care

## 2023-05-04 ENCOUNTER — PATIENT MESSAGE (OUTPATIENT)
Dept: PRIMARY CARE CLINIC | Age: 73
End: 2023-05-04

## 2023-05-11 ENCOUNTER — TELEPHONE (OUTPATIENT)
Dept: PRIMARY CARE CLINIC | Facility: CLINIC | Age: 73
End: 2023-05-11

## 2023-05-23 RX ORDER — ATORVASTATIN CALCIUM 40 MG/1
1 TABLET, FILM COATED ORAL NIGHTLY
COMMUNITY
Start: 2022-11-09

## 2023-05-23 RX ORDER — AMLODIPINE BESYLATE 10 MG/1
10 TABLET ORAL DAILY
COMMUNITY
Start: 2023-05-01

## 2023-05-23 RX ORDER — LISINOPRIL 10 MG/1
1 TABLET ORAL DAILY
COMMUNITY
Start: 2022-11-08

## 2023-05-23 RX ORDER — NYSTATIN 100000 [USP'U]/G
POWDER TOPICAL 3 TIMES DAILY
COMMUNITY
Start: 2023-04-28

## 2023-05-23 RX ORDER — EXENATIDE 2 MG/.85ML
INJECTION, SUSPENSION, EXTENDED RELEASE SUBCUTANEOUS
COMMUNITY
Start: 2022-07-27

## 2023-05-23 RX ORDER — METFORMIN HYDROCHLORIDE 500 MG/1
TABLET, EXTENDED RELEASE ORAL
COMMUNITY
Start: 2023-05-01

## 2023-05-23 RX ORDER — GLIMEPIRIDE 4 MG/1
1 TABLET ORAL NIGHTLY
COMMUNITY
Start: 2023-05-01

## 2023-05-23 RX ORDER — SODIUM BICARBONATE 650 MG/1
650 TABLET ORAL 2 TIMES DAILY
COMMUNITY
Start: 2022-07-07

## 2023-10-04 ENCOUNTER — NURSE ONLY (OUTPATIENT)
Age: 73
End: 2023-10-04

## 2023-10-04 DIAGNOSIS — E78.2 MIXED HYPERLIPIDEMIA: ICD-10-CM

## 2023-10-04 DIAGNOSIS — E11.65 TYPE 2 DIABETES MELLITUS WITH HYPERGLYCEMIA, UNSPECIFIED WHETHER LONG TERM INSULIN USE (HCC): ICD-10-CM

## 2023-10-04 DIAGNOSIS — R74.8 ABNORMAL LEVELS OF OTHER SERUM ENZYMES: ICD-10-CM

## 2023-10-04 DIAGNOSIS — E11.65 TYPE 2 DIABETES MELLITUS WITH HYPERGLYCEMIA, UNSPECIFIED WHETHER LONG TERM INSULIN USE (HCC): Primary | ICD-10-CM

## 2023-10-04 LAB
ALBUMIN SERPL-MCNC: 4.2 G/DL (ref 3.5–5)
ALBUMIN/GLOB SERPL: 1.2 (ref 1.1–2.2)
ALP SERPL-CCNC: 187 U/L (ref 45–117)
ALT SERPL-CCNC: 23 U/L (ref 12–78)
ANION GAP SERPL CALC-SCNC: 4 MMOL/L (ref 5–15)
AST SERPL-CCNC: 14 U/L (ref 15–37)
BILIRUB SERPL-MCNC: 0.3 MG/DL (ref 0.2–1)
BUN SERPL-MCNC: 22 MG/DL (ref 6–20)
BUN/CREAT SERPL: 16 (ref 12–20)
CALCIUM SERPL-MCNC: 9.7 MG/DL (ref 8.5–10.1)
CHLORIDE SERPL-SCNC: 112 MMOL/L (ref 97–108)
CHOLEST SERPL-MCNC: 155 MG/DL
CO2 SERPL-SCNC: 26 MMOL/L (ref 21–32)
CREAT SERPL-MCNC: 1.38 MG/DL (ref 0.55–1.02)
CREAT UR-MCNC: 97.5 MG/DL
EST. AVERAGE GLUCOSE BLD GHB EST-MCNC: 148 MG/DL
GLOBULIN SER CALC-MCNC: 3.6 G/DL (ref 2–4)
GLUCOSE SERPL-MCNC: 146 MG/DL (ref 65–100)
HBA1C MFR BLD: 6.8 % (ref 4–5.6)
HDLC SERPL-MCNC: 53 MG/DL
HDLC SERPL: 2.9 (ref 0–5)
LDLC SERPL CALC-MCNC: 83.6 MG/DL (ref 0–100)
MICROALBUMIN UR-MCNC: 11.9 MG/DL
MICROALBUMIN/CREAT UR-RTO: 122 MG/G (ref 0–30)
POTASSIUM SERPL-SCNC: 4.6 MMOL/L (ref 3.5–5.1)
PROT SERPL-MCNC: 7.8 G/DL (ref 6.4–8.2)
SODIUM SERPL-SCNC: 142 MMOL/L (ref 136–145)
TRIGL SERPL-MCNC: 92 MG/DL
VLDLC SERPL CALC-MCNC: 18.4 MG/DL

## 2023-10-11 ENCOUNTER — OFFICE VISIT (OUTPATIENT)
Age: 73
End: 2023-10-11
Payer: MEDICARE

## 2023-10-11 VITALS
DIASTOLIC BLOOD PRESSURE: 79 MMHG | HEIGHT: 63 IN | TEMPERATURE: 97.2 F | HEART RATE: 70 BPM | WEIGHT: 208.2 LBS | BODY MASS INDEX: 36.89 KG/M2 | OXYGEN SATURATION: 96 % | SYSTOLIC BLOOD PRESSURE: 151 MMHG | RESPIRATION RATE: 17 BRPM

## 2023-10-11 DIAGNOSIS — E11.65 TYPE 2 DIABETES MELLITUS WITH HYPERGLYCEMIA, UNSPECIFIED WHETHER LONG TERM INSULIN USE (HCC): Primary | ICD-10-CM

## 2023-10-11 DIAGNOSIS — E78.2 MIXED HYPERLIPIDEMIA: ICD-10-CM

## 2023-10-11 DIAGNOSIS — I10 ESSENTIAL (PRIMARY) HYPERTENSION: ICD-10-CM

## 2023-10-11 PROCEDURE — 3017F COLORECTAL CA SCREEN DOC REV: CPT | Performed by: INTERNAL MEDICINE

## 2023-10-11 PROCEDURE — G8427 DOCREV CUR MEDS BY ELIG CLIN: HCPCS | Performed by: INTERNAL MEDICINE

## 2023-10-11 PROCEDURE — 3044F HG A1C LEVEL LT 7.0%: CPT | Performed by: INTERNAL MEDICINE

## 2023-10-11 PROCEDURE — G8417 CALC BMI ABV UP PARAM F/U: HCPCS | Performed by: INTERNAL MEDICINE

## 2023-10-11 PROCEDURE — 3074F SYST BP LT 130 MM HG: CPT | Performed by: INTERNAL MEDICINE

## 2023-10-11 PROCEDURE — 3078F DIAST BP <80 MM HG: CPT | Performed by: INTERNAL MEDICINE

## 2023-10-11 PROCEDURE — 2022F DILAT RTA XM EVC RTNOPTHY: CPT | Performed by: INTERNAL MEDICINE

## 2023-10-11 PROCEDURE — G8399 PT W/DXA RESULTS DOCUMENT: HCPCS | Performed by: INTERNAL MEDICINE

## 2023-10-11 PROCEDURE — 1090F PRES/ABSN URINE INCON ASSESS: CPT | Performed by: INTERNAL MEDICINE

## 2023-10-11 PROCEDURE — G8484 FLU IMMUNIZE NO ADMIN: HCPCS | Performed by: INTERNAL MEDICINE

## 2023-10-11 PROCEDURE — 1123F ACP DISCUSS/DSCN MKR DOCD: CPT | Performed by: INTERNAL MEDICINE

## 2023-10-11 PROCEDURE — 99214 OFFICE O/P EST MOD 30 MIN: CPT | Performed by: INTERNAL MEDICINE

## 2023-10-11 PROCEDURE — 1036F TOBACCO NON-USER: CPT | Performed by: INTERNAL MEDICINE

## 2023-10-11 NOTE — PATIENT INSTRUCTIONS
SPECIFIC INSTRUCTIONS BELOW        Stay on  amaryl  4 mg at   night      bydureon b-cise 2 mg  weekly       metformin er 1 gm twice a day      Farxiga    5 mg  a  day beffore b-fast         -------------PAY ATTENTION TO THESE GENERAL INSTRUCTIONS -----------------      - The medications prescribed at this visit will not be available at pharmacy until 6 pm       - YOUR MED LIST IS NOT UP TO DATE AS SOME CHANGES ARE BEING MADE AFTER THE VISIT - FOLLOW SPECIFIC INSTRUCTIONS  ABOVE     -ANY tests other than blood work, which you opt to do  outside the  [facility] imaging facilities, you are responsible for prior authorizations if  required    - [address] [place] [address] Drive AVS- PLEASE IGNORE     Results     *Normal results will not be notified by a phone call starting January 1 2021   *If you have an upcoming visit, the results will be discussed at the visit   *Please sign up for MY CHART if you want access to your lab and test results  *Abnormal results which require immediate attention will be notified by phone call   *Abnormal results which do not require immediate assistance will be notified in 1-2 weeks       Refills    -    have your pharmacy send us a refill request . Refills are done max for one year and a visit is a must before refills are extended    Follow up appointments -  highly encourage you to make it when you are checking out. We can accommodate you into the schedule based on your clinical situation, but not for extending refills beyond a year. Labs are important to give refills and is important to get labs before the visit     Phone calls  -  Allow  24 hrs.  for non-urgent calls to be returned  Prior authorization - It may take 2-4 weeks to process  Forms  -  FMLA, DMV etc., will take up to 2 weeks to process  Cancellations - please notify the office 2 days in advance   Samples  - will only be dispensed at visits       If not showing for the appointments and cancelling

## 2023-11-02 RX ORDER — LISINOPRIL 10 MG/1
10 TABLET ORAL DAILY
Qty: 90 TABLET | Refills: 3 | Status: SHIPPED | OUTPATIENT
Start: 2023-11-02

## 2023-12-04 RX ORDER — EXENATIDE 2 MG/.85ML
INJECTION, SUSPENSION, EXTENDED RELEASE SUBCUTANEOUS
Qty: 3.4 ML | Refills: 3 | Status: SHIPPED | OUTPATIENT
Start: 2023-12-04

## 2024-01-03 ENCOUNTER — TRANSCRIBE ORDERS (OUTPATIENT)
Facility: HOSPITAL | Age: 74
End: 2024-01-03

## 2024-01-03 DIAGNOSIS — Z12.31 VISIT FOR SCREENING MAMMOGRAM: Primary | ICD-10-CM

## 2024-01-04 ENCOUNTER — HOSPITAL ENCOUNTER (OUTPATIENT)
Facility: HOSPITAL | Age: 74
Discharge: HOME | End: 2024-01-04
Payer: MEDICARE

## 2024-01-04 VITALS — HEIGHT: 63 IN | WEIGHT: 208 LBS | BODY MASS INDEX: 36.86 KG/M2

## 2024-01-04 DIAGNOSIS — Z12.31 VISIT FOR SCREENING MAMMOGRAM: ICD-10-CM

## 2024-01-04 PROCEDURE — 77063 BREAST TOMOSYNTHESIS BI: CPT

## 2024-02-01 DIAGNOSIS — E78.2 MIXED HYPERLIPIDEMIA: ICD-10-CM

## 2024-02-01 DIAGNOSIS — E11.65 TYPE 2 DIABETES MELLITUS WITH HYPERGLYCEMIA (HCC): ICD-10-CM

## 2024-02-01 RX ORDER — ATORVASTATIN CALCIUM 40 MG/1
TABLET, FILM COATED ORAL NIGHTLY
Qty: 90 TABLET | Refills: 4 | Status: SHIPPED | OUTPATIENT
Start: 2024-02-01

## 2024-03-12 DIAGNOSIS — E11.65 TYPE 2 DIABETES MELLITUS WITH HYPERGLYCEMIA (HCC): ICD-10-CM

## 2024-03-12 DIAGNOSIS — I10 ESSENTIAL (PRIMARY) HYPERTENSION: ICD-10-CM

## 2024-03-12 RX ORDER — GLIMEPIRIDE 4 MG/1
4 TABLET ORAL
Qty: 90 TABLET | Refills: 2 | Status: SHIPPED | OUTPATIENT
Start: 2024-03-12

## 2024-04-03 ENCOUNTER — NURSE ONLY (OUTPATIENT)
Age: 74
End: 2024-04-03

## 2024-04-03 DIAGNOSIS — I10 ESSENTIAL (PRIMARY) HYPERTENSION: ICD-10-CM

## 2024-04-03 DIAGNOSIS — E11.65 TYPE 2 DIABETES MELLITUS WITH HYPERGLYCEMIA, UNSPECIFIED WHETHER LONG TERM INSULIN USE (HCC): ICD-10-CM

## 2024-04-03 DIAGNOSIS — E78.2 MIXED HYPERLIPIDEMIA: ICD-10-CM

## 2024-04-03 LAB
ALBUMIN SERPL-MCNC: 4 G/DL (ref 3.5–5)
ALBUMIN/GLOB SERPL: 1.1 (ref 1.1–2.2)
ALP SERPL-CCNC: 206 U/L (ref 45–117)
ALT SERPL-CCNC: 23 U/L (ref 12–78)
ANION GAP SERPL CALC-SCNC: 4 MMOL/L (ref 5–15)
AST SERPL-CCNC: 14 U/L (ref 15–37)
BILIRUB SERPL-MCNC: 0.4 MG/DL (ref 0.2–1)
BUN SERPL-MCNC: 20 MG/DL (ref 6–20)
BUN/CREAT SERPL: 15 (ref 12–20)
CALCIUM SERPL-MCNC: 9.8 MG/DL (ref 8.5–10.1)
CHLORIDE SERPL-SCNC: 111 MMOL/L (ref 97–108)
CHOLEST SERPL-MCNC: 162 MG/DL
CO2 SERPL-SCNC: 25 MMOL/L (ref 21–32)
CREAT SERPL-MCNC: 1.33 MG/DL (ref 0.55–1.02)
CREAT UR-MCNC: 63.4 MG/DL
EST. AVERAGE GLUCOSE BLD GHB EST-MCNC: 160 MG/DL
GLOBULIN SER CALC-MCNC: 3.6 G/DL (ref 2–4)
GLUCOSE SERPL-MCNC: 154 MG/DL (ref 65–100)
HBA1C MFR BLD: 7.2 % (ref 4–5.6)
HDLC SERPL-MCNC: 59 MG/DL
HDLC SERPL: 2.7 (ref 0–5)
LDLC SERPL CALC-MCNC: 82.8 MG/DL (ref 0–100)
MICROALBUMIN UR-MCNC: 7.91 MG/DL
MICROALBUMIN/CREAT UR-RTO: 125 MG/G (ref 0–30)
POTASSIUM SERPL-SCNC: 4.6 MMOL/L (ref 3.5–5.1)
PROT SERPL-MCNC: 7.6 G/DL (ref 6.4–8.2)
SODIUM SERPL-SCNC: 140 MMOL/L (ref 136–145)
TRIGL SERPL-MCNC: 101 MG/DL
VLDLC SERPL CALC-MCNC: 20.2 MG/DL

## 2024-04-10 ENCOUNTER — OFFICE VISIT (OUTPATIENT)
Age: 74
End: 2024-04-10
Payer: MEDICARE

## 2024-04-10 VITALS
SYSTOLIC BLOOD PRESSURE: 122 MMHG | WEIGHT: 212 LBS | BODY MASS INDEX: 37.56 KG/M2 | HEIGHT: 63 IN | TEMPERATURE: 98.7 F | OXYGEN SATURATION: 96 % | HEART RATE: 89 BPM | DIASTOLIC BLOOD PRESSURE: 72 MMHG

## 2024-04-10 DIAGNOSIS — E78.2 MIXED HYPERLIPIDEMIA: ICD-10-CM

## 2024-04-10 DIAGNOSIS — N18.31 CHRONIC KIDNEY DISEASE, STAGE 3A (HCC): ICD-10-CM

## 2024-04-10 DIAGNOSIS — I10 ESSENTIAL (PRIMARY) HYPERTENSION: ICD-10-CM

## 2024-04-10 DIAGNOSIS — E11.65 TYPE 2 DIABETES MELLITUS WITH HYPERGLYCEMIA, UNSPECIFIED WHETHER LONG TERM INSULIN USE (HCC): Primary | ICD-10-CM

## 2024-04-10 PROCEDURE — 1123F ACP DISCUSS/DSCN MKR DOCD: CPT | Performed by: INTERNAL MEDICINE

## 2024-04-10 PROCEDURE — 3051F HG A1C>EQUAL 7.0%<8.0%: CPT | Performed by: INTERNAL MEDICINE

## 2024-04-10 PROCEDURE — 99214 OFFICE O/P EST MOD 30 MIN: CPT | Performed by: INTERNAL MEDICINE

## 2024-04-10 PROCEDURE — 3078F DIAST BP <80 MM HG: CPT | Performed by: INTERNAL MEDICINE

## 2024-04-10 PROCEDURE — 3074F SYST BP LT 130 MM HG: CPT | Performed by: INTERNAL MEDICINE

## 2024-04-10 PROCEDURE — 2022F DILAT RTA XM EVC RTNOPTHY: CPT | Performed by: INTERNAL MEDICINE

## 2024-04-10 PROCEDURE — G8399 PT W/DXA RESULTS DOCUMENT: HCPCS | Performed by: INTERNAL MEDICINE

## 2024-04-10 PROCEDURE — 1036F TOBACCO NON-USER: CPT | Performed by: INTERNAL MEDICINE

## 2024-04-10 PROCEDURE — 1090F PRES/ABSN URINE INCON ASSESS: CPT | Performed by: INTERNAL MEDICINE

## 2024-04-10 PROCEDURE — G8427 DOCREV CUR MEDS BY ELIG CLIN: HCPCS | Performed by: INTERNAL MEDICINE

## 2024-04-10 PROCEDURE — 3017F COLORECTAL CA SCREEN DOC REV: CPT | Performed by: INTERNAL MEDICINE

## 2024-04-10 PROCEDURE — G8417 CALC BMI ABV UP PARAM F/U: HCPCS | Performed by: INTERNAL MEDICINE

## 2024-04-10 RX ORDER — METFORMIN HYDROCHLORIDE 500 MG/1
TABLET, EXTENDED RELEASE ORAL
Qty: 180 TABLET | Refills: 3 | Status: SHIPPED | OUTPATIENT
Start: 2024-04-10

## 2024-04-10 RX ORDER — GLIMEPIRIDE 4 MG/1
TABLET ORAL
Qty: 90 TABLET | Refills: 2 | Status: SHIPPED | OUTPATIENT
Start: 2024-04-10

## 2024-04-10 NOTE — PROGRESS NOTES
[facility]                Unique Willis MD FACE       HISTORY OF PRESENT ILLNESS   Hilda Rodriguez is a 73 y.o.  female.      Patient here for f/u after last  visit of Type 2 diabetes mellitus  From   October 2023     Gained  4 lbs   She is attending to many banquets   She is now  with healthy living  course of  6 weeks  ( she says she is trying  to help herself )         Oct 2023     LOST 4 lbs      May 2023      She could not afford bydureon briefly    She saw opthal and pcp in the interim    Could nto get dilated exam because of  redness in the eye    She got anti-fungal cream for skin rash             Old history :    Referred : by self/pcp   Pt of Dr. Cesar    He transferred care as Dr. Cesar left    H/o diabetes for 5  years    Current A1C is 6.9 % from sept 2013  and symptoms/problems include none    Current diabetic medications include glyburide 5 mg 2 pills a day  and victoza 1.8         Review of Systems    Constitutional: gained weight          Physical Exam    Constitutional: She is oriented to person, place, and time. She appears well-developed and well-nourished.    Abdominal: Soft. Bowel sounds are normal.         Labs      Lab Results   Component Value Date    LABA1C 7.2 (H) 04/03/2024    LABA1C 6.8 (H) 10/04/2023    LABA1C 7.5 (H) 04/24/2023       Lab Results   Component Value Date     04/03/2024    K 4.6 04/03/2024     (H) 04/03/2024    CO2 25 04/03/2024    BUN 20 04/03/2024    CREATININE 1.33 (H) 04/03/2024    GLUCOSE 154 (H) 04/03/2024    CALCIUM 9.8 04/03/2024    PROT 7.6 04/03/2024    LABALBU 4.0 04/03/2024    BILITOT 0.4 04/03/2024    ALKPHOS 206 (H) 04/03/2024    AST 14 (L) 04/03/2024    ALT 23 04/03/2024    LABGLOM 42 (L) 04/03/2024    GFRAA 57 (L) 01/19/2022    AGRATIO 1.1 04/03/2024    GLOB 3.6 04/03/2024    CHOL 162 04/03/2024    TRIG 101 04/03/2024    LDLCALC 82.8 04/03/2024    HDL 59 04/03/2024       Lab Results   Component

## 2024-04-10 NOTE — PROGRESS NOTES
Hilda Rodriguez is a 73 y.o. female here for   Chief Complaint   Patient presents with    Diabetes     6 month follow up       All medication reviewed.     Vitals:    04/10/24 1033   BP: 122/72   Site: Right Upper Arm   Position: Sitting   Cuff Size: Large Adult   Pulse: 89   Temp: 98.7 °F (37.1 °C)   TempSrc: Temporal   SpO2: 96%   Weight: 96.2 kg (212 lb)   Height: 1.6 m (5' 3\")        1. Have you been to the ER, urgent care clinic since your last visit?  Hospitalized since your last visit? -[facility] for indigestion 12/2023.     2. Have you seen or consulted any other health care providers outside of the [facility] since your last visit?  Include any pap smears or colon screening.-Gastrology for pre op exam on 2/22/24 and [facility] for diabetic foot exam and [facility] for a diabetic eye exam 12/2023.

## 2024-04-10 NOTE — PATIENT INSTRUCTIONS
SPECIFIC INSTRUCTIONS BELOW        Stay on  amaryl  4 mg at   night      bydureon b-cise 2 mg  weekly       metformin er 1 gm twice a day      Farxiga    5 mg  a  day beffore b-fast         -------------PAY ATTENTION TO THESE GENERAL INSTRUCTIONS -----------------      - The medications prescribed at this visit will not be available at pharmacy until 6 pm       - YOUR MED LIST IS NOT UP TO DATE AS SOME CHANGES ARE BEING MADE AFTER THE VISIT - FOLLOW SPECIFIC INSTRUCTIONS  ABOVE     -ANY tests other than blood work, which you opt to do  outside the  [facility] facilities, you are responsible for prior authorizations if  required    - HEALTH MAINTENANCE IS NOT GOING TO BE UP TO DATE ON YOUR AVS- PLEASE IGNORE     Results     *Normal results will not be notified by a phone call starting January 1 2021   *If you have an upcoming visit, the results will be discussed at the visit   *Please sign up for MY CHART if you want access to your lab and test results  *Abnormal results which require immediate attention will be notified by phone call   *Abnormal results which do not require immediate assistance will be notified in 1-2 weeks       Refills    -    have your pharmacy send us a refill request . Refills are done max for one year and a visit is a must before refills are extended    Follow up appointments -  highly encourage you to make it when you are checking out. We can accommodate you into the schedule based on your clinical situation, but not for extending refills beyond a year. Labs are important to give refills and is important to get labs before the visit     Phone calls  -  Allow  24 hrs. for non-urgent calls to be returned  Prior authorization - It may take 2-4 weeks to process  Forms  -  FMLA, DMV etc., will take up to 2 weeks to process  Cancellations - please notify the office 2 days in advance   Samples  - will only be dispensed at visits       If not showing for the appointments and cancelling

## 2024-05-06 DIAGNOSIS — E11.65 TYPE 2 DIABETES MELLITUS WITH HYPERGLYCEMIA, WITHOUT LONG-TERM CURRENT USE OF INSULIN (HCC): Primary | ICD-10-CM

## 2024-05-07 RX ORDER — EXENATIDE 2 MG/.85ML
INJECTION, SUSPENSION, EXTENDED RELEASE SUBCUTANEOUS
Qty: 10.2 ML | Refills: 3 | Status: SHIPPED | OUTPATIENT
Start: 2024-05-07

## 2024-05-19 DIAGNOSIS — I10 ESSENTIAL (PRIMARY) HYPERTENSION: ICD-10-CM

## 2024-05-19 DIAGNOSIS — E11.65 TYPE 2 DIABETES MELLITUS WITH HYPERGLYCEMIA (HCC): ICD-10-CM

## 2024-05-20 RX ORDER — DAPAGLIFLOZIN 5 MG/1
5 TABLET, FILM COATED ORAL
Qty: 90 TABLET | Refills: 3 | Status: SHIPPED | OUTPATIENT
Start: 2024-05-20

## 2024-06-09 DIAGNOSIS — I10 ESSENTIAL (PRIMARY) HYPERTENSION: ICD-10-CM

## 2024-06-09 DIAGNOSIS — E11.65 TYPE 2 DIABETES MELLITUS WITH HYPERGLYCEMIA (HCC): ICD-10-CM

## 2024-06-10 RX ORDER — AMLODIPINE BESYLATE 10 MG/1
10 TABLET ORAL DAILY
Qty: 90 TABLET | Refills: 3 | Status: SHIPPED | OUTPATIENT
Start: 2024-06-10

## 2024-06-12 ENCOUNTER — ANESTHESIA (OUTPATIENT)
Facility: HOSPITAL | Age: 74
End: 2024-06-12
Payer: MEDICARE

## 2024-06-12 ENCOUNTER — ANESTHESIA EVENT (OUTPATIENT)
Facility: HOSPITAL | Age: 74
End: 2024-06-12
Payer: MEDICARE

## 2024-06-12 ENCOUNTER — HOSPITAL ENCOUNTER (OUTPATIENT)
Facility: HOSPITAL | Age: 74
Setting detail: OUTPATIENT SURGERY
Discharge: HOME | End: 2024-06-12
Attending: INTERNAL MEDICINE | Admitting: INTERNAL MEDICINE
Payer: MEDICARE

## 2024-06-12 VITALS
RESPIRATION RATE: 15 BRPM | HEART RATE: 64 BPM | TEMPERATURE: 97 F | BODY MASS INDEX: 38.28 KG/M2 | OXYGEN SATURATION: 98 % | DIASTOLIC BLOOD PRESSURE: 79 MMHG | HEIGHT: 62 IN | WEIGHT: 208 LBS | SYSTOLIC BLOOD PRESSURE: 123 MMHG

## 2024-06-12 PROCEDURE — 88305 TISSUE EXAM BY PATHOLOGIST: CPT

## 2024-06-12 PROCEDURE — 2500000003 HC RX 250 WO HCPCS: Performed by: NURSE ANESTHETIST, CERTIFIED REGISTERED

## 2024-06-12 PROCEDURE — 3600007502: Performed by: INTERNAL MEDICINE

## 2024-06-12 PROCEDURE — 7100000010 HC PHASE II RECOVERY - FIRST 15 MIN: Performed by: INTERNAL MEDICINE

## 2024-06-12 PROCEDURE — 3700000000 HC ANESTHESIA ATTENDED CARE: Performed by: INTERNAL MEDICINE

## 2024-06-12 PROCEDURE — 2580000003 HC RX 258: Performed by: INTERNAL MEDICINE

## 2024-06-12 PROCEDURE — 3700000001 HC ADD 15 MINUTES (ANESTHESIA): Performed by: INTERNAL MEDICINE

## 2024-06-12 PROCEDURE — 3600007512: Performed by: INTERNAL MEDICINE

## 2024-06-12 PROCEDURE — 6360000002 HC RX W HCPCS: Performed by: NURSE ANESTHETIST, CERTIFIED REGISTERED

## 2024-06-12 PROCEDURE — 2580000003 HC RX 258: Performed by: NURSE ANESTHETIST, CERTIFIED REGISTERED

## 2024-06-12 PROCEDURE — 2709999900 HC NON-CHARGEABLE SUPPLY: Performed by: INTERNAL MEDICINE

## 2024-06-12 PROCEDURE — 7100000011 HC PHASE II RECOVERY - ADDTL 15 MIN: Performed by: INTERNAL MEDICINE

## 2024-06-12 RX ORDER — SODIUM CHLORIDE 9 MG/ML
25 INJECTION, SOLUTION INTRAVENOUS PRN
Status: DISCONTINUED | OUTPATIENT
Start: 2024-06-12 | End: 2024-06-12 | Stop reason: HOSPADM

## 2024-06-12 RX ORDER — SODIUM CHLORIDE 0.9 % (FLUSH) 0.9 %
5-40 SYRINGE (ML) INJECTION PRN
Status: DISCONTINUED | OUTPATIENT
Start: 2024-06-12 | End: 2024-06-12 | Stop reason: HOSPADM

## 2024-06-12 RX ORDER — SODIUM CHLORIDE 9 MG/ML
INJECTION, SOLUTION INTRAVENOUS CONTINUOUS PRN
Status: DISCONTINUED | OUTPATIENT
Start: 2024-06-12 | End: 2024-06-12 | Stop reason: SDUPTHER

## 2024-06-12 RX ORDER — LIDOCAINE HYDROCHLORIDE 20 MG/ML
INJECTION, SOLUTION EPIDURAL; INFILTRATION; INTRACAUDAL; PERINEURAL PRN
Status: DISCONTINUED | OUTPATIENT
Start: 2024-06-12 | End: 2024-06-12 | Stop reason: SDUPTHER

## 2024-06-12 RX ORDER — SODIUM CHLORIDE 0.9 % (FLUSH) 0.9 %
5-40 SYRINGE (ML) INJECTION EVERY 12 HOURS SCHEDULED
Status: DISCONTINUED | OUTPATIENT
Start: 2024-06-12 | End: 2024-06-12 | Stop reason: HOSPADM

## 2024-06-12 RX ADMIN — PROPOFOL 50 MG: 10 INJECTION, EMULSION INTRAVENOUS at 12:17

## 2024-06-12 RX ADMIN — PROPOFOL 50 MG: 10 INJECTION, EMULSION INTRAVENOUS at 12:30

## 2024-06-12 RX ADMIN — PROPOFOL 50 MG: 10 INJECTION, EMULSION INTRAVENOUS at 12:14

## 2024-06-12 RX ADMIN — PROPOFOL 50 MG: 10 INJECTION, EMULSION INTRAVENOUS at 12:23

## 2024-06-12 RX ADMIN — PROPOFOL 50 MG: 10 INJECTION, EMULSION INTRAVENOUS at 12:38

## 2024-06-12 RX ADMIN — SODIUM CHLORIDE 25 ML: 9 INJECTION, SOLUTION INTRAVENOUS at 11:27

## 2024-06-12 RX ADMIN — SODIUM CHLORIDE: 9 INJECTION, SOLUTION INTRAVENOUS at 12:11

## 2024-06-12 RX ADMIN — LIDOCAINE HYDROCHLORIDE 100 MG: 20 INJECTION, SOLUTION EPIDURAL; INFILTRATION; INTRACAUDAL; PERINEURAL at 12:14

## 2024-06-12 RX ADMIN — PROPOFOL 50 MG: 10 INJECTION, EMULSION INTRAVENOUS at 12:47

## 2024-06-12 RX ADMIN — PROPOFOL 50 MG: 10 INJECTION, EMULSION INTRAVENOUS at 12:20

## 2024-06-12 RX ADMIN — PROPOFOL 50 MG: 10 INJECTION, EMULSION INTRAVENOUS at 12:16

## 2024-06-12 RX ADMIN — PROPOFOL 50 MG: 10 INJECTION, EMULSION INTRAVENOUS at 12:27

## 2024-06-12 RX ADMIN — PROPOFOL 50 MG: 10 INJECTION, EMULSION INTRAVENOUS at 12:34

## 2024-06-12 ASSESSMENT — PAIN - FUNCTIONAL ASSESSMENT: PAIN_FUNCTIONAL_ASSESSMENT: 0-10

## 2024-06-12 NOTE — PROGRESS NOTES
Endoscope was pre-cleaned at bedside immediately following procedure by ABAD Jordan    Glasses returned to patient.

## 2024-06-12 NOTE — H&P
[facility]  Outpatient History and Physical    Patient: Hilda Rodriguez    Physician: David Weir MD    Vital Signs: Blood pressure (!) 137/92, pulse 74, resp. rate 15, height 1.575 m (5' 2\"), weight 94.3 kg (208 lb), SpO2 95 %.    Allergies:   Allergies   Allergen Reactions    Sitagliptin Hives     Stopped for bad rash    Ciprofloxacin Rash       Chief Complaint: GERD and high risk surveillance hx/o polyps fhx/o colon cancer in son here for egd & colonoscopy      History:  Past Medical History:   Diagnosis Date    Diabetes mellitus (HCC)     Hypercholesterolemia     Hypertension       Past Surgical History:   Procedure Laterality Date     SECTION      x2    CYST INCISION AND DRAINAGE        Social History     Socioeconomic History    Marital status:      Spouse name: None    Number of children: None    Years of education: None    Highest education level: None   Tobacco Use    Smoking status: Never    Smokeless tobacco: Never   Substance and Sexual Activity    Alcohol use: No    Drug use: No    Sexual activity: Yes     Partners: Male     Social Determinants of Health     Financial Resource Strain: Low Risk  (10/6/2022)    Overall Financial Resource Strain (CARDIA)     Difficulty of Paying Living Expenses: Not very hard   Food Insecurity: No Food Insecurity (10/6/2022)    Hunger Vital Sign     Worried About Running Out of Food in the Last Year: Never true     Ran Out of Food in the Last Year: Never true      Family History   Problem Relation Age of Onset    Diabetes Mother     Diabetes Father     Diabetes Brother     Pancreatic Cancer Brother     Colon Cancer Child        Medications:   Prior to Admission medications    Medication Sig Start Date End Date Taking? Authorizing Provider   amLODIPine (NORVASC) 10 MG tablet TAKE 1 TABLET BY MOUTH EVERY DAY 6/10/24   Unique Willis MD   FARXIGA 5 MG tablet TAKE 1 TABLET BY MOUTH EVERY DAY BEFORE BREAKFAST 24

## 2024-06-12 NOTE — DISCHARGE INSTRUCTIONS
Hilda HAZEL Michael  [id number]  1950    It was my pleasure seeing you for your procedure.  You will also receive a summary report with the findings from this procedure and any further recommendations.  If you had polyps removed or biopsies taken during your procedure, you will receive a separate letter from me within the next 2 weeks.  If you don't receive this letter or if you have any questions, please call my office [phone].     Please take note of the post procedure instructions listed below.    Best Wishes,    Dr. Weir      CARE FOLLOWING YOUR PROCEDURE    These instructions give you information on caring for yourself after your procedure. Call your doctor if you have any problems or questions after your procedure.    HOME CARE  Walk if you have belly cramping or gas.  Walking will help get rid of the air and reduce the bloated feeling in your belly (abdomen).  Your IV site (where you received drugs) may be tender to touch.  Place warm towels on the site; keep your arm up on two pillows if you have any swelling or soreness in the area.  You may shower.    ACTIVITY:  Take frequent rest periods and move at a slower pace for the next 24 hours..  You may resume your regular activity tomorrow if you are feeling back to normal.  Do not drive or ride a bicycle for at least 24 hours (because of the medicine (anesthesia) used during the test).  Do not sign any important legal documents or use or operate any machinery for 24 hours  Do not take sleeping medicines/nerve drugs for 24 hours unless the doctor tells you.  You can return to work/school tomorrow unless otherwise instructed.    NUTRITION:  Drink plenty of fluids to keep your pee (urine) clear or pale yellow  Begin with a light meal and progress to your normal diet. Heavy or fried foods are harder to digest and may make you feel sick to your stomach (nauseated).  Once you are feeling back to normal, you may resume your normal

## 2024-06-12 NOTE — ANESTHESIA POSTPROCEDURE EVALUATION
Department of Anesthesiology  Postprocedure Note    Patient: Hilda Rodriguez  MRN: [id number]  YOB: 1950  Date of evaluation: 6/12/2024    Procedure Summary       Date: 06/12/24 Room / Location: [facility] ENDO 02 / [facility] ENDOSCOPY    Anesthesia Start: 1211 Anesthesia Stop: 1254    Procedures:       COLONOSCOPY POLYPECTOMY SNARE/BIOPSY      ESOPHAGOGASTRODUODENOSCOPY BIOPSY Diagnosis:       Hiatal hernia with GERD and esophagitis      Gastritis without bleeding      Diverticulosis of colon      Family history of colon cancer      Personal history of colonic polyps      Colon polyps      Internal hemorrhoids      (Atypical chest pain [R07.89])      (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])      (Chronic idiopathic constipation [K59.04])      (Diverticulosis of colon [K57.30])      (Family history of colon cancer [Z80.0])      (Gastroesophageal reflux disease with esophagitis, unspecified whether hemorrhage [K21.00])      (Heartburn [R12])      (Personal history of colonic polyps [Z86.010])      (Screening for colon cancer [Z12.11])    Surgeons: David Weir MD Responsible Provider: Marcelino Perez MD    Anesthesia Type: MAC ASA Status: 2            Anesthesia Type: MAC    Chin Phase I: Chin Score: 10    Chin Phase II: Chin Score: 10    Anesthesia Post Evaluation    Patient location during evaluation: PACU  Patient participation: complete - patient participated  Level of consciousness: awake  Pain score: 0  Airway patency: patent  Nausea & Vomiting: no nausea and no vomiting  Cardiovascular status: hemodynamically stable  Respiratory status: acceptable  Hydration status: stable  Multimodal analgesia pain management approach  Pain management: adequate    No notable events documented.

## 2024-06-12 NOTE — PROGRESS NOTES
ARRIVAL INFORMATION:  Verified patient name and date of birth, scheduled procedure, and informed consent.     : Nichol sheikh contact number: [phone]  Physician and staff can share information with the .     Belongings with patient include:  Clothing,Glasses    GI FOCUSED ASSESSMENT:  Neuro: Awake, alert, oriented x4  Respiratory: even and unlabored   GI: soft and non-distended  EKG Rhythm: normal sinus rhythm    Education:Reviewed general discharge instructions and  information.       The risks and benefits of the bite block have been explained to patient.  Patient verbalizes understanding.

## 2024-06-12 NOTE — ANESTHESIA PRE PROCEDURE
Department of Anesthesiology  Preprocedure Note       Name:  Hilda Rodriguez   Age:  73 y.o.  :  1950                                          MRN:  [id number]         Date:  2024      Surgeon: Surgeon(s):  David Weir MD    Procedure: Procedure(s):  COLONOSCOPY  ESOPHAGOGASTRODUODENOSCOPY    Medications prior to admission:   Prior to Admission medications    Medication Sig Start Date End Date Taking? Authorizing Provider   amLODIPine (NORVASC) 10 MG tablet TAKE 1 TABLET BY MOUTH EVERY DAY 6/10/24   Unique Willis MD   FARXIGA 5 MG tablet TAKE 1 TABLET BY MOUTH EVERY DAY BEFORE BREAKFAST 24   Unique Willis MD   BYDUREON BCISE 2 MG/0.85ML injection INJECT 2 MG UNDER THE SKIN ONCE WEEKLY 24   Unique Willis MD   glimepiride (AMARYL) 4 MG tablet One pill at night time 4/10/24   Unique Willis MD   metFORMIN (GLUCOPHAGE-XR) 500 MG extended release tablet TAKE 2 TABS BY MOUTH TWO (2) TIMES DAILY (WITH MEALS). STOP GLUMETZA 4/10/24   Unique Willis MD   atorvastatin (LIPITOR) 40 MG tablet TAKE 1 TABLET BY MOUTH NIGHTLY 24   Unique Willis MD   lisinopril (PRINIVIL;ZESTRIL) 10 MG tablet TAKE 1 TABLET BY MOUTH EVERY DAY 23   Unique Willis MD   nystatin (MYCOSTATIN) 372965 UNIT/GM powder Apply topically as needed 23   Automatic Reconciliation, Ar       Current medications:    No current facility-administered medications for this encounter.     Current Outpatient Medications   Medication Sig Dispense Refill   • amLODIPine (NORVASC) 10 MG tablet TAKE 1 TABLET BY MOUTH EVERY DAY 90 tablet 3   • FARXIGA 5 MG tablet TAKE 1 TABLET BY MOUTH EVERY DAY BEFORE BREAKFAST 90 tablet 3   • BYDUREON BCISE 2 MG/0.85ML injection INJECT 2 MG UNDER THE SKIN ONCE WEEKLY 10.2 mL 3   • glimepiride (AMARYL) 4 MG tablet One pill at night time 90 tablet 2   • metFORMIN (GLUCOPHAGE-XR) 500 MG extended release tablet TAKE 2 TABS BY MOUTH TWO (2) TIMES DAILY (WITH MEALS). STOP GLUMETZA 180 tablet 3

## 2024-06-12 NOTE — OP NOTE
Colonoscopy and EGD Procedure Note      Indications:  high risk surveillance hx/o polyps fhx/o colon cancer in son; chest pain, gerd     :  David Weir MD    Staff: Circulator: Sherri Jean RN; Madison Waller RN    Referring Provider: Leon Betts MD    Sedation:  MAC anesthesia Propofol    Procedure Details:  After informed consent was obtained with all risks and benefits of procedure explained and pre-operative exam completed, pt was placed in the left lateral decubitus position. Following sequential administration of sedation as per above, the gastroscope was inserted into the mouth and advanced under direct vision to second portion of the duodenum.  A careful inspection was made as the gastroscope was withdrawn, including a retroflexed view of the proximal stomach; findings and interventions are described below.      EGD Findings:  Esophagus: Normal proximal, mid, distal esophagus. EGJ at 36cm with a 2cm sliding reducible hiatal hernia hill-2 GEFV on retroflexion with grade C esophagitis at the EGJ  Stomach: mild antral erythema, congestion, and few hematin spots without bleeding and no ulcers/erosions seen, otherwise normal stomach and patent pylorus, biopsied cold forceps with minimal bleeding antrum, incisura, body, cardia, to r/o H pylori.  Duodenum/jejunum:normal    The bed was then turned and upon sequential sedation as per above, a digital rectal exam was performed per below. The Olympus videocolonoscope was inserted in the rectum and carefully advanced to the cecum, which was identified by the ileocecal valve and appendiceal orifice.  The quality of preparation was  good BPS 2/2/2 6 .  The colonoscope was slowly withdrawn with careful evaluation between folds. Retroflexion in the rectum was performed.     Colon Findings:   YONI: unremarkable  Rectum: medium internal hemorrhoids on retroflexion, otherwise unremarkable  Sigmoid: one 6mm sessile polyp

## 2024-06-16 DIAGNOSIS — N18.31 CHRONIC KIDNEY DISEASE, STAGE 3A (HCC): ICD-10-CM

## 2024-06-17 RX ORDER — METFORMIN HYDROCHLORIDE 500 MG/1
TABLET, EXTENDED RELEASE ORAL
Qty: 360 TABLET | Refills: 3 | Status: SHIPPED | OUTPATIENT
Start: 2024-06-17

## 2024-08-29 ENCOUNTER — OFFICE VISIT (OUTPATIENT)
Facility: CLINIC | Age: 74
End: 2024-08-29
Payer: MEDICARE

## 2024-08-29 VITALS
HEIGHT: 62 IN | WEIGHT: 212 LBS | TEMPERATURE: 98.4 F | HEART RATE: 70 BPM | SYSTOLIC BLOOD PRESSURE: 124 MMHG | OXYGEN SATURATION: 98 % | BODY MASS INDEX: 39.01 KG/M2 | DIASTOLIC BLOOD PRESSURE: 74 MMHG

## 2024-08-29 DIAGNOSIS — E11.21 TYPE 2 DIABETES MELLITUS WITH DIABETIC NEPHROPATHY, WITHOUT LONG-TERM CURRENT USE OF INSULIN (HCC): ICD-10-CM

## 2024-08-29 DIAGNOSIS — E78.00 HYPERCHOLESTEROLEMIA: ICD-10-CM

## 2024-08-29 DIAGNOSIS — R09.89 DECREASED DORSALIS PEDIS PULSE: ICD-10-CM

## 2024-08-29 DIAGNOSIS — I10 ESSENTIAL HYPERTENSION, BENIGN: Primary | ICD-10-CM

## 2024-08-29 DIAGNOSIS — K21.00 GASTROESOPHAGEAL REFLUX DISEASE WITH ESOPHAGITIS, UNSPECIFIED WHETHER HEMORRHAGE: ICD-10-CM

## 2024-08-29 DIAGNOSIS — R74.8 ELEVATED ALKALINE PHOSPHATASE LEVEL: ICD-10-CM

## 2024-08-29 DIAGNOSIS — R42 DIZZINESS: ICD-10-CM

## 2024-08-29 PROCEDURE — 3051F HG A1C>EQUAL 7.0%<8.0%: CPT | Performed by: INTERNAL MEDICINE

## 2024-08-29 PROCEDURE — G8417 CALC BMI ABV UP PARAM F/U: HCPCS | Performed by: INTERNAL MEDICINE

## 2024-08-29 PROCEDURE — 3074F SYST BP LT 130 MM HG: CPT | Performed by: INTERNAL MEDICINE

## 2024-08-29 PROCEDURE — 3078F DIAST BP <80 MM HG: CPT | Performed by: INTERNAL MEDICINE

## 2024-08-29 PROCEDURE — 2022F DILAT RTA XM EVC RTNOPTHY: CPT | Performed by: INTERNAL MEDICINE

## 2024-08-29 PROCEDURE — G8427 DOCREV CUR MEDS BY ELIG CLIN: HCPCS | Performed by: INTERNAL MEDICINE

## 2024-08-29 PROCEDURE — 99204 OFFICE O/P NEW MOD 45 MIN: CPT | Performed by: INTERNAL MEDICINE

## 2024-08-29 PROCEDURE — 1036F TOBACCO NON-USER: CPT | Performed by: INTERNAL MEDICINE

## 2024-08-29 PROCEDURE — 1090F PRES/ABSN URINE INCON ASSESS: CPT | Performed by: INTERNAL MEDICINE

## 2024-08-29 PROCEDURE — G8399 PT W/DXA RESULTS DOCUMENT: HCPCS | Performed by: INTERNAL MEDICINE

## 2024-08-29 PROCEDURE — 3017F COLORECTAL CA SCREEN DOC REV: CPT | Performed by: INTERNAL MEDICINE

## 2024-08-29 PROCEDURE — 1123F ACP DISCUSS/DSCN MKR DOCD: CPT | Performed by: INTERNAL MEDICINE

## 2024-08-29 RX ORDER — OMEPRAZOLE 40 MG/1
40 CAPSULE, DELAYED RELEASE ORAL DAILY
COMMUNITY
Start: 2024-06-24

## 2024-08-29 SDOH — ECONOMIC STABILITY: INCOME INSECURITY: HOW HARD IS IT FOR YOU TO PAY FOR THE VERY BASICS LIKE FOOD, HOUSING, MEDICAL CARE, AND HEATING?: NOT HARD AT ALL

## 2024-08-29 SDOH — ECONOMIC STABILITY: FOOD INSECURITY: WITHIN THE PAST 12 MONTHS, YOU WORRIED THAT YOUR FOOD WOULD RUN OUT BEFORE YOU GOT MONEY TO BUY MORE.: NEVER TRUE

## 2024-08-29 SDOH — ECONOMIC STABILITY: FOOD INSECURITY: WITHIN THE PAST 12 MONTHS, THE FOOD YOU BOUGHT JUST DIDN'T LAST AND YOU DIDN'T HAVE MONEY TO GET MORE.: NEVER TRUE

## 2024-08-29 ASSESSMENT — ENCOUNTER SYMPTOMS
SHORTNESS OF BREATH: 0
DIARRHEA: 0
NAUSEA: 0
VOMITING: 0
COUGH: 0
ABDOMINAL PAIN: 0

## 2024-08-29 ASSESSMENT — PATIENT HEALTH QUESTIONNAIRE - PHQ9
SUM OF ALL RESPONSES TO PHQ QUESTIONS 1-9: 0
1. LITTLE INTEREST OR PLEASURE IN DOING THINGS: NOT AT ALL
2. FEELING DOWN, DEPRESSED OR HOPELESS: NOT AT ALL
SUM OF ALL RESPONSES TO PHQ9 QUESTIONS 1 & 2: 0

## 2024-08-29 NOTE — PROGRESS NOTES
.  Chief Complaint   Patient presents with    [facility]     Establish Care with Dr. Solomon. Former patient of Dr. Cruz.      .  \"Have you been to the ER, urgent care clinic since your last visit?  Hospitalized since your last visit?\"    NO    “Have you seen or consulted any other health care providers outside of [facility] since your last visit?”    YES - When: approximately 2 months ago.  Where and Why: Dr. Weir for colonoscopy and upper endoscopy. 2 months ago [facility] for ear pain. 2 days ago Dr. Marcell Jackson for eye exam .    ./74 (Site: Right Upper Arm, Position: Sitting, Cuff Size: Large Adult)   Pulse 70   Temp 98.4 °F (36.9 °C) (Oral)   Ht 1.575 m (5' 2\")   Wt 96.2 kg (212 lb)   SpO2 98%   BMI 38.78 kg/m²           Click Here for Release of Records Request

## 2024-09-27 LAB
BASOPHILS # BLD AUTO: 0 X10E3/UL (ref 0–0.2)
BASOPHILS NFR BLD AUTO: 1 %
EOSINOPHIL # BLD AUTO: 0.5 X10E3/UL (ref 0–0.4)
EOSINOPHIL NFR BLD AUTO: 7 %
ERYTHROCYTE [DISTWIDTH] IN BLOOD BY AUTOMATED COUNT: 13.2 % (ref 11.7–15.4)
HCT VFR BLD AUTO: 38 % (ref 34–46.6)
HGB BLD-MCNC: 12.2 G/DL (ref 11.1–15.9)
IMM GRANULOCYTES # BLD AUTO: 0 X10E3/UL (ref 0–0.1)
IMM GRANULOCYTES NFR BLD AUTO: 0 %
LYMPHOCYTES # BLD AUTO: 1.7 X10E3/UL (ref 0.7–3.1)
LYMPHOCYTES NFR BLD AUTO: 27 %
MCH RBC QN AUTO: 27.1 PG (ref 26.6–33)
MCHC RBC AUTO-ENTMCNC: 32.1 G/DL (ref 31.5–35.7)
MCV RBC AUTO: 84 FL (ref 79–97)
MONOCYTES # BLD AUTO: 0.5 X10E3/UL (ref 0.1–0.9)
MONOCYTES NFR BLD AUTO: 7 %
NEUTROPHILS # BLD AUTO: 3.6 X10E3/UL (ref 1.4–7)
NEUTROPHILS NFR BLD AUTO: 58 %
PLATELET # BLD AUTO: 346 X10E3/UL (ref 150–450)
RBC # BLD AUTO: 4.51 X10E6/UL (ref 3.77–5.28)
TSH SERPL DL<=0.005 MIU/L-ACNC: 1.3 UIU/ML (ref 0.45–4.5)
WBC # BLD AUTO: 6.3 X10E3/UL (ref 3.4–10.8)

## 2024-09-30 LAB
ALP BONE CFR SERPL: 37 % (ref 14–68)
ALP INTEST CFR SERPL: 7 % (ref 0–18)
ALP LIVER CFR SERPL: 56 % (ref 18–85)
ALP SERPL-CCNC: 215 IU/L (ref 44–121)

## 2024-10-03 ENCOUNTER — LAB (OUTPATIENT)
Age: 74
End: 2024-10-03

## 2024-10-03 DIAGNOSIS — I10 ESSENTIAL (PRIMARY) HYPERTENSION: ICD-10-CM

## 2024-10-03 DIAGNOSIS — E78.2 MIXED HYPERLIPIDEMIA: ICD-10-CM

## 2024-10-03 DIAGNOSIS — N18.31 CHRONIC KIDNEY DISEASE, STAGE 3A (HCC): ICD-10-CM

## 2024-10-03 LAB
CREAT UR-MCNC: 95.8 MG/DL
MICROALBUMIN UR-MCNC: 12.1 MG/DL
MICROALBUMIN/CREAT UR-RTO: 126 MG/G (ref 0–30)

## 2024-10-05 LAB
ALBUMIN SERPL-MCNC: 4 G/DL (ref 3.5–5)
ALBUMIN/GLOB SERPL: 1.1 (ref 1.1–2.2)
ALP SERPL-CCNC: 214 U/L (ref 45–117)
ALT SERPL-CCNC: 19 U/L (ref 12–78)
ANION GAP SERPL CALC-SCNC: 2 MMOL/L (ref 2–12)
AST SERPL-CCNC: 10 U/L (ref 15–37)
BILIRUB SERPL-MCNC: 0.4 MG/DL (ref 0.2–1)
BUN SERPL-MCNC: 21 MG/DL (ref 6–20)
BUN/CREAT SERPL: 14 (ref 12–20)
CALCIUM SERPL-MCNC: 9.6 MG/DL (ref 8.5–10.1)
CHLORIDE SERPL-SCNC: 113 MMOL/L (ref 97–108)
CHOLEST SERPL-MCNC: 172 MG/DL
CO2 SERPL-SCNC: 27 MMOL/L (ref 21–32)
CREAT SERPL-MCNC: 1.5 MG/DL (ref 0.55–1.02)
EST. AVERAGE GLUCOSE BLD GHB EST-MCNC: 229 MG/DL
GLOBULIN SER CALC-MCNC: 3.6 G/DL (ref 2–4)
GLUCOSE SERPL-MCNC: 235 MG/DL (ref 65–100)
HBA1C MFR BLD: 9.6 % (ref 4–5.6)
HDLC SERPL-MCNC: 57 MG/DL
HDLC SERPL: 3 (ref 0–5)
LDLC SERPL CALC-MCNC: 90.8 MG/DL (ref 0–100)
POTASSIUM SERPL-SCNC: 4.8 MMOL/L (ref 3.5–5.1)
PROT SERPL-MCNC: 7.6 G/DL (ref 6.4–8.2)
SODIUM SERPL-SCNC: 142 MMOL/L (ref 136–145)
TRIGL SERPL-MCNC: 121 MG/DL
VLDLC SERPL CALC-MCNC: 24.2 MG/DL

## 2024-10-10 ENCOUNTER — OFFICE VISIT (OUTPATIENT)
Age: 74
End: 2024-10-10
Payer: MEDICARE

## 2024-10-10 VITALS
SYSTOLIC BLOOD PRESSURE: 144 MMHG | WEIGHT: 211 LBS | TEMPERATURE: 97.2 F | DIASTOLIC BLOOD PRESSURE: 70 MMHG | BODY MASS INDEX: 38.83 KG/M2 | HEART RATE: 77 BPM | OXYGEN SATURATION: 97 % | HEIGHT: 62 IN

## 2024-10-10 DIAGNOSIS — E78.2 MIXED HYPERLIPIDEMIA: ICD-10-CM

## 2024-10-10 DIAGNOSIS — E11.65 TYPE 2 DIABETES MELLITUS WITH HYPERGLYCEMIA, WITHOUT LONG-TERM CURRENT USE OF INSULIN (HCC): Primary | ICD-10-CM

## 2024-10-10 DIAGNOSIS — E66.01 MORBID (SEVERE) OBESITY DUE TO EXCESS CALORIES: ICD-10-CM

## 2024-10-10 DIAGNOSIS — I10 ESSENTIAL (PRIMARY) HYPERTENSION: ICD-10-CM

## 2024-10-10 DIAGNOSIS — R74.8 ELEVATED ALKALINE PHOSPHATASE LEVEL: ICD-10-CM

## 2024-10-10 PROCEDURE — 3078F DIAST BP <80 MM HG: CPT | Performed by: INTERNAL MEDICINE

## 2024-10-10 PROCEDURE — G8399 PT W/DXA RESULTS DOCUMENT: HCPCS | Performed by: INTERNAL MEDICINE

## 2024-10-10 PROCEDURE — 1036F TOBACCO NON-USER: CPT | Performed by: INTERNAL MEDICINE

## 2024-10-10 PROCEDURE — 99214 OFFICE O/P EST MOD 30 MIN: CPT | Performed by: INTERNAL MEDICINE

## 2024-10-10 PROCEDURE — 1123F ACP DISCUSS/DSCN MKR DOCD: CPT | Performed by: INTERNAL MEDICINE

## 2024-10-10 PROCEDURE — G8427 DOCREV CUR MEDS BY ELIG CLIN: HCPCS | Performed by: INTERNAL MEDICINE

## 2024-10-10 PROCEDURE — 2022F DILAT RTA XM EVC RTNOPTHY: CPT | Performed by: INTERNAL MEDICINE

## 2024-10-10 PROCEDURE — G8417 CALC BMI ABV UP PARAM F/U: HCPCS | Performed by: INTERNAL MEDICINE

## 2024-10-10 PROCEDURE — 3017F COLORECTAL CA SCREEN DOC REV: CPT | Performed by: INTERNAL MEDICINE

## 2024-10-10 PROCEDURE — G8484 FLU IMMUNIZE NO ADMIN: HCPCS | Performed by: INTERNAL MEDICINE

## 2024-10-10 PROCEDURE — 3077F SYST BP >= 140 MM HG: CPT | Performed by: INTERNAL MEDICINE

## 2024-10-10 PROCEDURE — 1090F PRES/ABSN URINE INCON ASSESS: CPT | Performed by: INTERNAL MEDICINE

## 2024-10-10 PROCEDURE — 3046F HEMOGLOBIN A1C LEVEL >9.0%: CPT | Performed by: INTERNAL MEDICINE

## 2024-10-10 RX ORDER — GLIMEPIRIDE 4 MG/1
TABLET ORAL
Qty: 180 TABLET | Refills: 2 | Status: SHIPPED | OUTPATIENT
Start: 2024-10-10

## 2024-10-10 RX ORDER — ERGOCALCIFEROL 1.25 MG/1
50000 CAPSULE, LIQUID FILLED ORAL WEEKLY
COMMUNITY
Start: 2024-09-26 | End: 2025-09-26

## 2024-10-10 NOTE — PROGRESS NOTES
Hilda Rodriguez is a 74 y.o. female here for   Chief Complaint   Patient presents with    Diabetes    Follow-up       1. Have you been to the ER, urgent care clinic since your last visit?  Hospitalized since your last visit? -Yes, 06/12 Endoscopy    2. Have you seen or consulted any other health care providers outside of the [facility] since your last visit?  Include any pap smears or colon screening.-No      BP (!) 144/70 (Site: Left Upper Arm, Position: Sitting, Cuff Size: Large Adult)   Pulse 77   Temp 97.2 °F (36.2 °C) (Temporal)   Ht 1.575 m (5' 2\")   Wt 95.7 kg (211 lb)   SpO2 97%   BMI 38.59 kg/m²

## 2024-10-10 NOTE — PATIENT INSTRUCTIONS
SPECIFIC INSTRUCTIONS BELOW        Increase   amaryl   ( glimepiride )  4 mg   in  AM   and   at   night      bydureon b-cise 2 mg  weekly       metformin er 1 gm twice a day      Farxiga    5 mg  a  day beffore b-fast         -------------PAY ATTENTION TO THESE GENERAL INSTRUCTIONS -----------------      - The medications prescribed at this visit will not be available at pharmacy until 6 pm       - YOUR MED LIST IS NOT UP TO DATE AS SOME CHANGES ARE BEING MADE AFTER THE VISIT - FOLLOW SPECIFIC INSTRUCTIONS  ABOVE     -ANY tests other than blood work, which you opt to do  outside the  [facility] facilities, you are responsible for prior authorizations if  required    - HEALTH MAINTENANCE IS NOT GOING TO BE UP TO DATE ON YOUR AVS- PLEASE IGNORE     Results     *Normal results will not be notified by a phone call starting January 1 2021   *If you have an upcoming visit, the results will be discussed at the visit   *Please sign up for MY CHART if you want access to your lab and test results  *Abnormal results which require immediate attention will be notified by phone call   *Abnormal results which do not require immediate assistance will be notified in 1-2 weeks       Refills    -    have your pharmacy send us a refill request . Refills are done max for one year and a visit is a must before refills are extended    Follow up appointments -  highly encourage you to make it when you are checking out. We can accommodate you into the schedule based on your clinical situation, but not for extending refills beyond a year. Labs are important to give refills and is important to get labs before the visit     Phone calls  -  Allow  24 hrs. for non-urgent calls to be returned  Prior authorization - It may take 2-4 weeks to process  Forms  -  FMLA, DMV etc., will take up to 2 weeks to process  Cancellations - please notify the office 2 days in advance   Samples  - will only be dispensed at visits       If not showing for

## 2024-10-10 NOTE — PROGRESS NOTES
[facility] DIABETES AND ENDOCRINOLOGY                Unique Willis MD FACE       HISTORY OF PRESENT ILLNESS   Hilda Rodriguez is a 74 y.o.  female.      Patient here for f/u after last  visit of Type 2 diabetes mellitus  From   April 2024 April 2024     Gained  4 lbs   She is attending to many banquets   She is now  with healthy living  course of  6 weeks  ( she says she is trying  to help herself )         Oct 2023     LOST 4 lbs      May 2023      She could not afford bydureon briefly    She saw opthal and pcp in the interim    Could nto get dilated exam because of  redness in the eye    She got anti-fungal cream for skin rash             Old history :    Referred : by self/pcp   Pt of Dr. Cesar    He transferred care as Dr. Cesar left    H/o diabetes for 5  years    Current A1C is 6.9 % from sept 2013  and symptoms/problems include none    Current diabetic medications include glyburide 5 mg 2 pills a day  and victoza 1.8         Review of Systems    Constitutional: gained weight          Physical Exam    Constitutional: She is oriented to person, place, and time. She appears well-developed and well-nourished.    Abdominal: Soft. Bowel sounds are normal.         Labs      Lab Results   Component Value Date    LABA1C 9.6 (H) 10/03/2024    LABA1C 7.2 (H) 04/03/2024    LABA1C 6.8 (H) 10/04/2023       Lab Results   Component Value Date     10/03/2024    K 4.8 10/03/2024     (H) 10/03/2024    CO2 27 10/03/2024    BUN 21 (H) 10/03/2024    CREATININE 1.50 (H) 10/03/2024    GLUCOSE 235 (H) 10/03/2024    CALCIUM 9.6 10/03/2024    BILITOT 0.4 10/03/2024    ALKPHOS 214 (H) 10/03/2024    AST 10 (L) 10/03/2024    ALT 19 10/03/2024    LABGLOM 36 (L) 10/03/2024    GFRAA 57 (L) 01/19/2022    AGRATIO 1.1 04/24/2023    GLOB 3.6 10/03/2024    CHOL 172 10/03/2024    TRIG 121 10/03/2024    LDL 90.8 10/03/2024    HDL 57 10/03/2024       Lab Results   Component Value Date    [facility]

## 2024-10-16 ENCOUNTER — HOSPITAL ENCOUNTER (OUTPATIENT)
Facility: HOSPITAL | Age: 74
Discharge: HOME | End: 2024-10-18
Attending: INTERNAL MEDICINE
Payer: MEDICARE

## 2024-10-16 DIAGNOSIS — R09.89 DECREASED DORSALIS PEDIS PULSE: ICD-10-CM

## 2024-10-16 PROCEDURE — 93923 UPR/LXTR ART STDY 3+ LVLS: CPT

## 2024-10-17 LAB
VAS LEFT ARM BP: 149 MMHG
VAS LEFT TBI: 1.01
VAS LEFT TOE PRESSURE: 150 MMHG
VAS RIGHT ARM BP: 146 MMHG
VAS RIGHT TBI: 0.88
VAS RIGHT TOE PRESSURE: 131 MMHG

## 2024-10-17 PROCEDURE — 93923 UPR/LXTR ART STDY 3+ LVLS: CPT | Performed by: SURGERY

## 2024-10-23 PROBLEM — R09.89 DECREASED DORSALIS PEDIS PULSE: Status: ACTIVE | Noted: 2024-10-23

## 2024-10-23 LAB
A2 MACROGLOB SERPL-MCNC: 223 MG/DL (ref 110–276)
ALT SERPL W P-5'-P-CCNC: 13 IU/L (ref 0–40)
APO A-I SERPL-MCNC: 142 MG/DL (ref 116–209)
AST SERPL W P-5'-P-CCNC: 18 IU/L (ref 0–40)
BILIRUB SERPL-MCNC: 0.2 MG/DL (ref 0–1.2)
CHOLEST SERPL-MCNC: 178 MG/DL (ref 100–199)
FIBROSIS SCORING:: ABNORMAL
FIBROSIS STAGE SERPL QL: ABNORMAL
GGT SERPL-CCNC: 38 IU/L (ref 0–60)
GLUCOSE SERPL-MCNC: 180 MG/DL (ref 70–99)
HAPTOGLOB SERPL-MCNC: 191 MG/DL (ref 42–346)
LABORATORY COMMENT REPORT: ABNORMAL
LIVER FIBR SCORE SERPL CALC.FIBROSURE: 0.19 (ref 0–0.21)
LIVER STEATOSIS GRADE SERPL QL: ABNORMAL
LIVER STEATOSIS SCORE SERPL: 0.75 (ref 0–0.4)
NASH GRADE SERPL QL: ABNORMAL
NASH INTERPRETATION SERPL-IMP: ABNORMAL
NASH SCORE SERPL: 0.39 (ref 0–0.25)
NASH SCORING: ABNORMAL
STEATOSIS SCORING: ABNORMAL
TEST PERFORMANCE INFO SPEC: ABNORMAL
TEST PERFORMANCE INFO SPEC: ABNORMAL
TRIGL SERPL-MCNC: 126 MG/DL (ref 0–149)

## 2024-10-26 DIAGNOSIS — I10 ESSENTIAL (PRIMARY) HYPERTENSION: Primary | ICD-10-CM

## 2024-10-28 RX ORDER — LISINOPRIL 10 MG/1
10 TABLET ORAL DAILY
Qty: 90 TABLET | Refills: 3 | Status: SHIPPED | OUTPATIENT
Start: 2024-10-28

## 2024-10-30 ENCOUNTER — OFFICE VISIT (OUTPATIENT)
Age: 74
End: 2024-10-30
Payer: MEDICARE

## 2024-10-30 VITALS
TEMPERATURE: 97.7 F | BODY MASS INDEX: 38.64 KG/M2 | OXYGEN SATURATION: 95 % | WEIGHT: 210 LBS | DIASTOLIC BLOOD PRESSURE: 78 MMHG | HEART RATE: 100 BPM | SYSTOLIC BLOOD PRESSURE: 137 MMHG | HEIGHT: 62 IN

## 2024-10-30 DIAGNOSIS — I10 ESSENTIAL (PRIMARY) HYPERTENSION: ICD-10-CM

## 2024-10-30 DIAGNOSIS — E11.65 TYPE 2 DIABETES MELLITUS WITH HYPERGLYCEMIA, WITHOUT LONG-TERM CURRENT USE OF INSULIN (HCC): Primary | ICD-10-CM

## 2024-10-30 DIAGNOSIS — E78.2 MIXED HYPERLIPIDEMIA: ICD-10-CM

## 2024-10-30 PROCEDURE — G8427 DOCREV CUR MEDS BY ELIG CLIN: HCPCS | Performed by: INTERNAL MEDICINE

## 2024-10-30 PROCEDURE — 1160F RVW MEDS BY RX/DR IN RCRD: CPT | Performed by: INTERNAL MEDICINE

## 2024-10-30 PROCEDURE — 3017F COLORECTAL CA SCREEN DOC REV: CPT | Performed by: INTERNAL MEDICINE

## 2024-10-30 PROCEDURE — 1123F ACP DISCUSS/DSCN MKR DOCD: CPT | Performed by: INTERNAL MEDICINE

## 2024-10-30 PROCEDURE — 1036F TOBACCO NON-USER: CPT | Performed by: INTERNAL MEDICINE

## 2024-10-30 PROCEDURE — 3046F HEMOGLOBIN A1C LEVEL >9.0%: CPT | Performed by: INTERNAL MEDICINE

## 2024-10-30 PROCEDURE — 3078F DIAST BP <80 MM HG: CPT | Performed by: INTERNAL MEDICINE

## 2024-10-30 PROCEDURE — G8417 CALC BMI ABV UP PARAM F/U: HCPCS | Performed by: INTERNAL MEDICINE

## 2024-10-30 PROCEDURE — G8399 PT W/DXA RESULTS DOCUMENT: HCPCS | Performed by: INTERNAL MEDICINE

## 2024-10-30 PROCEDURE — 1126F AMNT PAIN NOTED NONE PRSNT: CPT | Performed by: INTERNAL MEDICINE

## 2024-10-30 PROCEDURE — 3075F SYST BP GE 130 - 139MM HG: CPT | Performed by: INTERNAL MEDICINE

## 2024-10-30 PROCEDURE — 2022F DILAT RTA XM EVC RTNOPTHY: CPT | Performed by: INTERNAL MEDICINE

## 2024-10-30 PROCEDURE — G8484 FLU IMMUNIZE NO ADMIN: HCPCS | Performed by: INTERNAL MEDICINE

## 2024-10-30 PROCEDURE — 1090F PRES/ABSN URINE INCON ASSESS: CPT | Performed by: INTERNAL MEDICINE

## 2024-10-30 PROCEDURE — 1159F MED LIST DOCD IN RCRD: CPT | Performed by: INTERNAL MEDICINE

## 2024-10-30 PROCEDURE — 99214 OFFICE O/P EST MOD 30 MIN: CPT | Performed by: INTERNAL MEDICINE

## 2024-10-30 NOTE — PATIENT INSTRUCTIONS
SPECIFIC INSTRUCTIONS BELOW        Decrease  to   amaryl   ( glimepiride )  to  3  ( 2  +1  )   mg   in  AM   and    3  ( 2  +1  )  mg  at   night       bydureon b-cise 2 mg  weekly       metformin er 1 gm twice a day      Farxiga    5 mg  a  day beffore b-fast         -------------PAY ATTENTION TO THESE GENERAL INSTRUCTIONS -----------------      - The medications prescribed at this visit will not be available at pharmacy until 6 pm       - YOUR MED LIST IS NOT UP TO DATE AS SOME CHANGES ARE BEING MADE AFTER THE VISIT - FOLLOW SPECIFIC INSTRUCTIONS  ABOVE     -ANY tests other than blood work, which you opt to do  outside the  [facility] facilities, you are responsible for prior authorizations if  required    - HEALTH MAINTENANCE IS NOT GOING TO BE UP TO DATE ON YOUR AVS- PLEASE IGNORE     Results     *Normal results will not be notified by a phone call starting January 1 2021   *If you have an upcoming visit, the results will be discussed at the visit   *Please sign up for MY CHART if you want access to your lab and test results  *Abnormal results which require immediate attention will be notified by phone call   *Abnormal results which do not require immediate assistance will be notified in 1-2 weeks       Refills    -    have your pharmacy send us a refill request . Refills are done max for one year and a visit is a must before refills are extended    Follow up appointments -  highly encourage you to make it when you are checking out. We can accommodate you into the schedule based on your clinical situation, but not for extending refills beyond a year. Labs are important to give refills and is important to get labs before the visit     Phone calls  -  Allow  24 hrs. for non-urgent calls to be returned  Prior authorization - It may take 2-4 weeks to process  Forms  -  FMLA, DMV etc., will take up to 2 weeks to process  Cancellations - please notify the office 2 days in advance   Samples  - will only be

## 2024-10-30 NOTE — PROGRESS NOTES
[facility] DIABETES AND ENDOCRINOLOGY                Unique Willis MD FACE       HISTORY OF PRESENT ILLNESS   Hilda Rodriguez is a 74 y.o.  female.      Patient here for  20 DAY   f/u after last  visit of Type 2 diabetes mellitus  From  Oct 2024        She had CARRASCO fibrosure test   Lost 1 lb    She has not eaten any candy  and she  has done well with diet           April 2024     Gained  4 lbs   She is attending to many banquets   She is now  with healthy living  course of  6 weeks  ( she says she is trying  to help herself )         Oct 2023     LOST 4 lbs      May 2023      She could not afford bydureon briefly    She saw opthal and pcp in the interim    Could nto get dilated exam because of  redness in the eye    She got anti-fungal cream for skin rash             Old history :    Referred : by self/pcp   Pt of Dr. Cesar    He transferred care as Dr. Cesar left    H/o diabetes for 5  years    Current A1C is 6.9 % from sept 2013  and symptoms/problems include none    Current diabetic medications include glyburide 5 mg 2 pills a day  and victoza 1.8         Review of Systems    Constitutional: gained weight          Physical Exam    Constitutional: She is oriented to person, place, and time. She appears well-developed and well-nourished.    Abdominal: Soft. Bowel sounds are normal.         Labs      Lab Results   Component Value Date    LABA1C 9.6 (H) 10/03/2024    LABA1C 7.2 (H) 04/03/2024    LABA1C 6.8 (H) 10/04/2023       Lab Results   Component Value Date     10/03/2024    K 4.8 10/03/2024     (H) 10/03/2024    CO2 27 10/03/2024    BUN 21 (H) 10/03/2024    CREATININE 1.50 (H) 10/03/2024    GLUCOSE 180 (H) 10/21/2024    CALCIUM 9.6 10/03/2024    BILITOT 0.2 10/21/2024    ALKPHOS 214 (H) 10/03/2024    AST 18 10/21/2024    ALT 13 10/21/2024    LABGLOM 36 (L) 10/03/2024    GFRAA 57 (L) 01/19/2022    AGRATIO 1.1 04/24/2023    GLOB 3.6 10/03/2024    CHOL 178 10/21/2024    TRIG 126

## 2024-10-30 NOTE — PROGRESS NOTES
Hilda Rodriguez is a 74 y.o. female here for   Chief Complaint   Patient presents with    Diabetes       1. Have you been to the ER, urgent care clinic since your last visit?  Hospitalized since your last visit? -NO    2. Have you seen or consulted any other health care providers outside of the [facility] since your last visit?  Include any pap smears or colon screening.-NO

## 2024-11-01 ENCOUNTER — OFFICE VISIT (OUTPATIENT)
Facility: CLINIC | Age: 74
End: 2024-11-01

## 2024-11-01 VITALS
OXYGEN SATURATION: 97 % | HEIGHT: 62 IN | TEMPERATURE: 98.1 F | SYSTOLIC BLOOD PRESSURE: 136 MMHG | BODY MASS INDEX: 38.83 KG/M2 | HEART RATE: 82 BPM | DIASTOLIC BLOOD PRESSURE: 78 MMHG | WEIGHT: 211 LBS

## 2024-11-01 DIAGNOSIS — K76.0 HEPATIC STEATOSIS: ICD-10-CM

## 2024-11-01 DIAGNOSIS — Z00.00 MEDICARE ANNUAL WELLNESS VISIT, SUBSEQUENT: Primary | ICD-10-CM

## 2024-11-01 DIAGNOSIS — E11.21 TYPE 2 DIABETES MELLITUS WITH DIABETIC NEPHROPATHY, WITHOUT LONG-TERM CURRENT USE OF INSULIN (HCC): ICD-10-CM

## 2024-11-01 DIAGNOSIS — I10 ESSENTIAL HYPERTENSION, BENIGN: ICD-10-CM

## 2024-11-01 DIAGNOSIS — R09.89 DECREASED DORSALIS PEDIS PULSE: ICD-10-CM

## 2024-11-01 DIAGNOSIS — E55.9 VITAMIN D DEFICIENCY: ICD-10-CM

## 2024-11-01 RX ORDER — TETANUS TOXOID, REDUCED DIPHTHERIA TOXOID AND ACELLULAR PERTUSSIS VACCINE, ADSORBED 5; 2.5; 8; 8; 2.5 [IU]/.5ML; [IU]/.5ML; UG/.5ML; UG/.5ML; UG/.5ML
0.5 SUSPENSION INTRAMUSCULAR ONCE
Qty: 1 EACH | Refills: 0 | Status: SHIPPED | OUTPATIENT
Start: 2024-11-01 | End: 2024-11-01

## 2024-11-01 SDOH — ECONOMIC STABILITY: FOOD INSECURITY: WITHIN THE PAST 12 MONTHS, THE FOOD YOU BOUGHT JUST DIDN'T LAST AND YOU DIDN'T HAVE MONEY TO GET MORE.: NEVER TRUE

## 2024-11-01 SDOH — ECONOMIC STABILITY: FOOD INSECURITY: WITHIN THE PAST 12 MONTHS, YOU WORRIED THAT YOUR FOOD WOULD RUN OUT BEFORE YOU GOT MONEY TO BUY MORE.: NEVER TRUE

## 2024-11-01 SDOH — ECONOMIC STABILITY: INCOME INSECURITY: HOW HARD IS IT FOR YOU TO PAY FOR THE VERY BASICS LIKE FOOD, HOUSING, MEDICAL CARE, AND HEATING?: NOT HARD AT ALL

## 2024-11-01 ASSESSMENT — PATIENT HEALTH QUESTIONNAIRE - PHQ9
1. LITTLE INTEREST OR PLEASURE IN DOING THINGS: NOT AT ALL
SUM OF ALL RESPONSES TO PHQ QUESTIONS 1-9: 0
SUM OF ALL RESPONSES TO PHQ9 QUESTIONS 1 & 2: 0
SUM OF ALL RESPONSES TO PHQ QUESTIONS 1-9: 0
2. FEELING DOWN, DEPRESSED OR HOPELESS: NOT AT ALL

## 2024-11-01 ASSESSMENT — LIFESTYLE VARIABLES
HOW OFTEN DO YOU HAVE A DRINK CONTAINING ALCOHOL: NEVER
HOW MANY STANDARD DRINKS CONTAINING ALCOHOL DO YOU HAVE ON A TYPICAL DAY: PATIENT DOES NOT DRINK

## 2024-11-01 NOTE — PROGRESS NOTES
.  Chief Complaint   Patient presents with    Medicare AWV    Discuss Labs     .  \"Have you been to the ER, urgent care clinic since your last visit?  Hospitalized since your last visit?\"    NO    “Have you seen or consulted any other health care providers outside our system since your last visit?”    YES - When: approximately 3  weeks ago.  Where and Why: DR. Baer for follow up.      “Have you had a diabetic eye exam?”    YES - Where: Dr. Faith Pay 2024 Nurse/CMA to request most recent records if not in the chart     Date of last diabetic eye exam: 2/5/2018     ./78 (Site: Right Upper Arm, Position: Sitting, Cuff Size: Medium Adult)   Pulse 82   Temp 98.1 °F (36.7 °C) (Oral)   Ht 1.575 m (5' 2\")   Wt 95.7 kg (211 lb)   SpO2 97%   BMI 38.59 kg/m²         
Ciprofloxacin Rash     Prior to Visit Medications    Medication Sig Taking? Authorizing Provider   tetanus-diphth-acell pertussis (BOOSTRIX) 5-2.5-18.5 LF-MCG/0.5 JORY injection Inject 0.5 mLs into the muscle once for 1 dose Yes Nallely Solomon MD   lisinopril (PRINIVIL;ZESTRIL) 10 MG tablet TAKE 1 TABLET BY MOUTH EVERY DAY Yes Unique Willis MD   vitamin D (ERGOCALCIFEROL) 1.25 MG (91472 UT) CAPS capsule Take 1 capsule by mouth once a week Yes Deisy Johnson MD   omeprazole (PRILOSEC) 40 MG delayed release capsule Take 1 capsule by mouth daily Yes Deisy Johnson MD   metFORMIN (GLUCOPHAGE-XR) 500 MG extended release tablet TAKE 2 TABLETS BY MOUTH TWO (2) TIMES DAILY (WITH MEALS). STOP GLUMETZA Yes Unique Willis MD   amLODIPine (NORVASC) 10 MG tablet TAKE 1 TABLET BY MOUTH EVERY DAY Yes Unique Willis MD   FARXIGA 5 MG tablet TAKE 1 TABLET BY MOUTH EVERY DAY BEFORE BREAKFAST Yes Unique Willis MD   BYDUREON BCISE 2 MG/0.85ML injection INJECT 2 MG UNDER THE SKIN ONCE WEEKLY Yes Unique Willis MD   atorvastatin (LIPITOR) 40 MG tablet TAKE 1 TABLET BY MOUTH NIGHTLY Yes Unique Willis MD       CareTeam (Including outside providers/suppliers regularly involved in providing care):   Patient Care Team:  Nallely Solomon MD as PCP - General (Internal Medicine)  [facility]allely Solomon [facility]  Marcell Sparrow Jr., MD as Physician      Reviewed and updated this visit:  Tobacco  Allergies  Meds  Problems  Med Hx  Surg Hx  Soc Hx  Fam Hx              The patient (or guardian, if applicable) and other individuals in attendance with the patient were advised that Artificial Intelligence will be utilized during this visit to record and process the conversation to generate a clinical note. The patient (or guardian, if applicable) and other individuals in attendance at the appointment consented to the use of AI, including the recording.

## 2024-11-11 ENCOUNTER — ANESTHESIA EVENT (OUTPATIENT)
Facility: HOSPITAL | Age: 74
End: 2024-11-11
Payer: MEDICARE

## 2024-11-11 ENCOUNTER — HOSPITAL ENCOUNTER (OUTPATIENT)
Facility: HOSPITAL | Age: 74
Setting detail: OUTPATIENT SURGERY
Discharge: HOME | End: 2024-11-11
Attending: INTERNAL MEDICINE | Admitting: INTERNAL MEDICINE
Payer: MEDICARE

## 2024-11-11 ENCOUNTER — ANESTHESIA (OUTPATIENT)
Facility: HOSPITAL | Age: 74
End: 2024-11-11
Payer: MEDICARE

## 2024-11-11 VITALS
DIASTOLIC BLOOD PRESSURE: 60 MMHG | BODY MASS INDEX: 36.68 KG/M2 | SYSTOLIC BLOOD PRESSURE: 104 MMHG | OXYGEN SATURATION: 96 % | HEART RATE: 77 BPM | RESPIRATION RATE: 16 BRPM | TEMPERATURE: 97.8 F | WEIGHT: 207 LBS | HEIGHT: 63 IN

## 2024-11-11 PROCEDURE — 2500000003 HC RX 250 WO HCPCS: Performed by: REGISTERED NURSE

## 2024-11-11 PROCEDURE — 2709999900 HC NON-CHARGEABLE SUPPLY: Performed by: INTERNAL MEDICINE

## 2024-11-11 PROCEDURE — 3700000000 HC ANESTHESIA ATTENDED CARE: Performed by: INTERNAL MEDICINE

## 2024-11-11 PROCEDURE — 3600007512: Performed by: INTERNAL MEDICINE

## 2024-11-11 PROCEDURE — 7100000010 HC PHASE II RECOVERY - FIRST 15 MIN: Performed by: INTERNAL MEDICINE

## 2024-11-11 PROCEDURE — 7100000011 HC PHASE II RECOVERY - ADDTL 15 MIN: Performed by: INTERNAL MEDICINE

## 2024-11-11 PROCEDURE — 88305 TISSUE EXAM BY PATHOLOGIST: CPT

## 2024-11-11 PROCEDURE — 6360000002 HC RX W HCPCS: Performed by: REGISTERED NURSE

## 2024-11-11 PROCEDURE — 3600007502: Performed by: INTERNAL MEDICINE

## 2024-11-11 PROCEDURE — 2580000003 HC RX 258: Performed by: REGISTERED NURSE

## 2024-11-11 PROCEDURE — 3700000001 HC ADD 15 MINUTES (ANESTHESIA): Performed by: INTERNAL MEDICINE

## 2024-11-11 RX ORDER — LIDOCAINE HYDROCHLORIDE 20 MG/ML
INJECTION, SOLUTION EPIDURAL; INFILTRATION; INTRACAUDAL; PERINEURAL
Status: DISCONTINUED | OUTPATIENT
Start: 2024-11-11 | End: 2024-11-11 | Stop reason: SDUPTHER

## 2024-11-11 RX ORDER — SODIUM CHLORIDE 0.9 % (FLUSH) 0.9 %
5-40 SYRINGE (ML) INJECTION PRN
Status: DISCONTINUED | OUTPATIENT
Start: 2024-11-11 | End: 2024-11-11 | Stop reason: HOSPADM

## 2024-11-11 RX ORDER — SODIUM CHLORIDE 9 MG/ML
INJECTION, SOLUTION INTRAVENOUS PRN
Status: DISCONTINUED | OUTPATIENT
Start: 2024-11-11 | End: 2024-11-11 | Stop reason: HOSPADM

## 2024-11-11 RX ORDER — SODIUM CHLORIDE 9 MG/ML
INJECTION, SOLUTION INTRAVENOUS
Status: DISCONTINUED | OUTPATIENT
Start: 2024-11-11 | End: 2024-11-11 | Stop reason: SDUPTHER

## 2024-11-11 RX ORDER — PHENYLEPHRINE HCL IN 0.9% NACL 0.4MG/10ML
SYRINGE (ML) INTRAVENOUS
Status: DISCONTINUED | OUTPATIENT
Start: 2024-11-11 | End: 2024-11-11 | Stop reason: SDUPTHER

## 2024-11-11 RX ORDER — SODIUM CHLORIDE 0.9 % (FLUSH) 0.9 %
5-40 SYRINGE (ML) INJECTION EVERY 12 HOURS SCHEDULED
Status: DISCONTINUED | OUTPATIENT
Start: 2024-11-11 | End: 2024-11-11 | Stop reason: HOSPADM

## 2024-11-11 RX ADMIN — PROPOFOL 50 MG: 10 INJECTION, EMULSION INTRAVENOUS at 14:23

## 2024-11-11 RX ADMIN — SODIUM CHLORIDE: 9 INJECTION, SOLUTION INTRAVENOUS at 14:16

## 2024-11-11 RX ADMIN — PROPOFOL 20 MG: 10 INJECTION, EMULSION INTRAVENOUS at 14:28

## 2024-11-11 RX ADMIN — PROPOFOL 30 MG: 10 INJECTION, EMULSION INTRAVENOUS at 14:26

## 2024-11-11 RX ADMIN — LIDOCAINE HYDROCHLORIDE 100 MG: 20 INJECTION, SOLUTION EPIDURAL; INFILTRATION; INTRACAUDAL; PERINEURAL at 14:23

## 2024-11-11 RX ADMIN — PROPOFOL 30 MG: 10 INJECTION, EMULSION INTRAVENOUS at 14:27

## 2024-11-11 RX ADMIN — PROPOFOL 50 MG: 10 INJECTION, EMULSION INTRAVENOUS at 14:24

## 2024-11-11 RX ADMIN — Medication 80 MCG: at 14:35

## 2024-11-11 RX ADMIN — PROPOFOL 20 MG: 10 INJECTION, EMULSION INTRAVENOUS at 14:25

## 2024-11-11 ASSESSMENT — PAIN - FUNCTIONAL ASSESSMENT: PAIN_FUNCTIONAL_ASSESSMENT: NONE - DENIES PAIN

## 2024-11-11 NOTE — PROGRESS NOTES
ARRIVAL INFORMATION:  Verified patient name and date of birth, scheduled procedure, and informed consent.     : Nichol (daughter) contact number: [phone]  Physician and staff can share information with the .     Receive texts: yes    Belongings with patient include:  Clothing,Glasses, Jewelry (4 rings, 1 bracelet on patient)    GI FOCUSED ASSESSMENT:  Neuro: Awake, alert, oriented x4  Respiratory: even and unlabored   GI: soft and non-distended  EKG Rhythm: normal sinus rhythm    Education:Reviewed general discharge instructions and  information.  The risks and benefits of the bite block have been explained to patient.  Patient verbalizes understanding.

## 2024-11-11 NOTE — DISCHARGE INSTRUCTIONS
Hilda HAZEL Michael  [id number]  1950    It was my pleasure seeing you for your procedure.  You will also receive a summary report with the findings from this procedure and any further recommendations.  If you had polyps removed or biopsies taken during your procedure, you will receive a separate letter from me within the next 2 weeks.  If you don't receive this letter or if you have any questions, please call my office [phone].     Please take note of the post procedure instructions listed below.    Best Wishes,    Dr. Weir      CARE FOLLOWING YOUR PROCEDURE    These instructions give you information on caring for yourself after your procedure. Call your doctor if you have any problems or questions after your procedure.    GET HELP RIGHT AWAY AND SEEK IMMEDIATE MEDICAL CARE IF:  You have more than a spotting of blood in your stool.  You pass clumps of tissue (blood clots) or fill the toilet with blood.  Your belly is painfully swollen or puffy (abdominal distention).  You throw up (vomit).  You have a fever.  You have redness, pain or swelling at the IV site that last greater than two days.  You have abdominal pain or discomfort that is severe or gets worse throughout the day.    For 24 hours after general anesthesia or intravenous analgesia / sedation  you may experience:  Drowsiness, dizziness, sleepiness, or confusion  Difficulty remembering or delayed reaction times  Difficulty with your balance, especially while walking, move slowly and carefully, do not make sudden position changes  Difficulty focusing or blurred vision    HOME CARE  Walk if you have belly cramping or gas.  Walking will help get rid of the air and reduce the bloated feeling in your belly (abdomen).  Your IV site (where you received drugs) may be tender to touch.  Place warm towels on the site; keep your arm up on two pillows if you have any swelling or soreness in the area.  You may shower.    ACTIVITY:  Take

## 2024-11-11 NOTE — PROGRESS NOTES
Endoscopy Case End Note:    1432:  Procedure scope was pre-cleaned, per protocol, at bedside by SHARMIN Dyson RN.      1433:  Report received from anesthesia - VINI Monzon CRNA.  See anesthesia flowsheet for intra-procedure vital signs and events.    1433:  Glasses returned to patient.

## 2024-11-11 NOTE — ANESTHESIA PRE PROCEDURE
Department of Anesthesiology  Preprocedure Note       Name:  Hilda Rodriguez   Age:  74 y.o.  :  1950                                          MRN:  [id number]         Date:  2024      Surgeon: Surgeon(s):  David Weir MD    Procedure: Procedure(s):  ESOPHAGOGASTRODUODENOSCOPY    Medications prior to admission:   Prior to Admission medications    Medication Sig Start Date End Date Taking? Authorizing Provider   lisinopril (PRINIVIL;ZESTRIL) 10 MG tablet TAKE 1 TABLET BY MOUTH EVERY DAY 10/28/24  Yes Unique Willis MD   vitamin D (ERGOCALCIFEROL) 1.25 MG (92077 UT) CAPS capsule Take 1 capsule by mouth once a week 24 Yes Deisy Johnson MD   omeprazole (PRILOSEC) 40 MG delayed release capsule Take 1 capsule by mouth daily 24  Yes Deisy Johnson MD   metFORMIN (GLUCOPHAGE-XR) 500 MG extended release tablet TAKE 2 TABLETS BY MOUTH TWO (2) TIMES DAILY (WITH MEALS). STOP GLUMETZA 24  Yes Unique Willis MD   amLODIPine (NORVASC) 10 MG tablet TAKE 1 TABLET BY MOUTH EVERY DAY 6/10/24  Yes Unique Willis MD   FARXIGA 5 MG tablet TAKE 1 TABLET BY MOUTH EVERY DAY BEFORE BREAKFAST 24  Yes Unique Willis MD   atorvastatin (LIPITOR) 40 MG tablet TAKE 1 TABLET BY MOUTH NIGHTLY 24  Yes Unique Willis MD   BYDUREON BCISE 2 MG/0.85ML injection INJECT 2 MG UNDER THE SKIN ONCE WEEKLY 24   Unique Willis MD       Current medications:    Current Facility-Administered Medications   Medication Dose Route Frequency Provider Last Rate Last Admin    sodium chloride flush 0.9 % injection 5-40 mL  5-40 mL IntraVENous 2 times per day David Weir MD        sodium chloride flush 0.9 % injection 5-40 mL  5-40 mL IntraVENous PRN David Weir MD        0.9 % sodium chloride infusion   IntraVENous PRN David Weir MD           Allergies:    Allergies   Allergen Reactions    Jardiance [Empagliflozin] Hives    Nifedipine      intolerant-causes

## 2024-11-11 NOTE — OP NOTE
NAME:  Hilda Rodriguez   :   1950   MRN:   [id number]     Date/Time:  2024 2:31 PM    Esophagogastroduodenoscopy (EGD) Procedure Note    :  David Weir MD    Staff: Circulator: Angelita Reis, LIZA; Barbie Dyson RN    Referring Provider:  Nallely Solomon MD    Anethesia/Sedation:  MAC anesthesia Propofol    Procedure Details   After infomed consent was obtained for the procedure, with all risks and benefits of procedure explained the patient was taken to the endoscopy suite and placed in the left lateral decubitus position.  Following sequential administration of sedation as per above, the gastroscope was inserted into the mouth and advanced under direct vision to second portion of the duodenum.  A careful inspection was made as the gastroscope was withdrawn, including a retroflexed view of the proximal stomach; findings and interventions are described below.      Findings:  Esophagus: Normal esophageal mucosa. EGJ at 36cm with 2cm sliding hiatal hernia hill-2 GEFV. Proximal to the EGJ was two columns of 1-2cm salmon mucosa possible Cobb's biopsied cold forceps [place] protocol with minimal bleeding.  Stomach:normal   Duodenum/jejunum:normal           Complications: None.     EBL:  minimal    Impression:    See findings above    Recommendations:   - Await pathology. You should receive a letter within 2 weeks.   - Resume normal medications.  - discussed w/ Nichol    Discharge disposition:  Home in the company of  when able to ambulate    David Weir MD

## 2024-11-11 NOTE — ANESTHESIA POSTPROCEDURE EVALUATION
Department of Anesthesiology  Postprocedure Note    Patient: Hilda Rodriguez  MRN: [id number]  YOB: 1950  Date of evaluation: 11/11/2024    Procedure Summary       Date: 11/11/24 Room / Location: [facility] ENDO 01 / [facility] ENDOSCOPY    Anesthesia Start: 1418 Anesthesia Stop: 1437    Procedure: ESOPHAGOGASTRODUODENOSCOPY BIOPSY (Upper GI Region) Diagnosis:       Gastroesophageal reflux disease with hiatal hernia      Esophagitis      (Fatty liver [K76.0])      (Elevated liver function tests [R79.89])      (Gastroesophageal reflux disease with hiatal hernia [K21.9, K44.9])      (Erosive esophagitis [K22.10])    Surgeons: David Weir MD Responsible Provider: Loco Trinh MD    Anesthesia Type: MAC ASA Status: 3            Anesthesia Type: MAC    Chin Phase I: Chin Score: 10    Chin Phase II: Chin Score: 10    Anesthesia Post Evaluation    Patient location during evaluation: PACU  Patient participation: complete - patient participated  Level of consciousness: awake  Airway patency: patent  Nausea & Vomiting: no vomiting  Cardiovascular status: hemodynamically stable  Respiratory status: acceptable  Hydration status: euvolemic    No notable events documented.

## 2024-11-11 NOTE — H&P
Plan of Care/Planned Procedure: egd  The heart, lungs and mental status were satisfactory for the administration of MAC sedation and for the procedure by the Anesthesiology team.      Informed consent was obtained for the procedure, including sedation.  Risks of perforation, hemorrhage, adverse drug reaction, and aspiration were discussed.  The risks, benefits and alternatives were again reiterated to the patient to include the risk of infection, bleeding, medication reaction, aspiration, perforation which could require immediate surgery, cardiopulmonary complication, issues with anesthesia and death.    The patient does  wish to proceed with the procedure.

## 2024-12-06 ENCOUNTER — OFFICE VISIT (OUTPATIENT)
Age: 74
End: 2024-12-06
Payer: MEDICARE

## 2024-12-06 VITALS
HEIGHT: 62 IN | WEIGHT: 209.5 LBS | TEMPERATURE: 97.4 F | DIASTOLIC BLOOD PRESSURE: 84 MMHG | RESPIRATION RATE: 18 BRPM | BODY MASS INDEX: 38.55 KG/M2 | HEART RATE: 77 BPM | OXYGEN SATURATION: 93 % | SYSTOLIC BLOOD PRESSURE: 136 MMHG

## 2024-12-06 DIAGNOSIS — R09.89 DECREASED DORSALIS PEDIS PULSE: Primary | ICD-10-CM

## 2024-12-06 PROCEDURE — G8427 DOCREV CUR MEDS BY ELIG CLIN: HCPCS | Performed by: SURGERY

## 2024-12-06 PROCEDURE — 1123F ACP DISCUSS/DSCN MKR DOCD: CPT | Performed by: SURGERY

## 2024-12-06 PROCEDURE — 1126F AMNT PAIN NOTED NONE PRSNT: CPT | Performed by: SURGERY

## 2024-12-06 PROCEDURE — 1036F TOBACCO NON-USER: CPT | Performed by: SURGERY

## 2024-12-06 PROCEDURE — 3017F COLORECTAL CA SCREEN DOC REV: CPT | Performed by: SURGERY

## 2024-12-06 PROCEDURE — 3078F DIAST BP <80 MM HG: CPT | Performed by: SURGERY

## 2024-12-06 PROCEDURE — 3075F SYST BP GE 130 - 139MM HG: CPT | Performed by: SURGERY

## 2024-12-06 PROCEDURE — G8399 PT W/DXA RESULTS DOCUMENT: HCPCS | Performed by: SURGERY

## 2024-12-06 PROCEDURE — 99203 OFFICE O/P NEW LOW 30 MIN: CPT | Performed by: SURGERY

## 2024-12-06 PROCEDURE — G8417 CALC BMI ABV UP PARAM F/U: HCPCS | Performed by: SURGERY

## 2024-12-06 PROCEDURE — 1090F PRES/ABSN URINE INCON ASSESS: CPT | Performed by: SURGERY

## 2024-12-06 PROCEDURE — G8484 FLU IMMUNIZE NO ADMIN: HCPCS | Performed by: SURGERY

## 2024-12-06 ASSESSMENT — PATIENT HEALTH QUESTIONNAIRE - PHQ9
1. LITTLE INTEREST OR PLEASURE IN DOING THINGS: NOT AT ALL
SUM OF ALL RESPONSES TO PHQ QUESTIONS 1-9: 0
SUM OF ALL RESPONSES TO PHQ QUESTIONS 1-9: 0
SUM OF ALL RESPONSES TO PHQ9 QUESTIONS 1 & 2: 0
SUM OF ALL RESPONSES TO PHQ QUESTIONS 1-9: 0
SUM OF ALL RESPONSES TO PHQ QUESTIONS 1-9: 0
2. FEELING DOWN, DEPRESSED OR HOPELESS: NOT AT ALL

## 2024-12-06 NOTE — PROGRESS NOTES
Identified patient with two patient identifiers (name and ). Reviewed chart in preparation for visit and have obtained necessary documentation.    Hilda Rodriguez is a 74 y.o. female  Chief Complaint   Patient presents with    New Patient     Decreased dorsalis pedis     BP (!) 146/79 (Site: Right Upper Arm, Position: Sitting, Cuff Size: Large Adult)   Pulse 77   Temp 97.4 °F (36.3 °C) (Oral)   Resp 18   Ht 1.575 m (5' 2\")   Wt 95 kg (209 lb 8 oz)   SpO2 93%   BMI 38.32 kg/m²     1. Have you been to the ER, urgent care clinic since your last visit?  Hospitalized since your last visit?no    2. Have you seen or consulted any other health care providers outside of the [facility] since your last visit?  Include any pap smears or colon screening. No    Patient and provider made aware of elevated BP x2. Patient asymptomatic. Patient reminded to monitor BP, continue to take BP medications if prescribed, and follow up with PCP/Cardiologist.  Patient expressed understanding and agreement.

## 2024-12-26 NOTE — PROGRESS NOTES
Vascular History and Physical    Patient: Hilda Rodriguez  MRN: [id number]    YOB: 1950  Age: 74 y.o.  Sex: female     Chief Complaint:  Chief Complaint   Patient presents with    New Patient     Decreased dorsalis pedis       History of Present Illness: Hilda Rodriguez is a 74 y.o. very pleasant woman is here today for vascular assessment for abnormal SRINIVASA findings.  Patient is otherwise pretty healthy woman with daily activities.  Patient underwent recent SRINIVASA examination which shows noncompressible pressures at the ankle level.  More details as discussed below.  Patient denies any particular leg pain when she walks.  Patient's most recent HbA1c is close to 7.  Patient also being closely monitored with the cholesterol levels as well.  She is currently on 40 mg of Lipitor.  She is on currently with Norvasc as well.  Patient denies any chest pain shortness of breath.  Denies abdominal pain or history of MI or recent stroke symptoms.    Social History:  Social Connections: Not on file       Past Medical History:  Past Medical History:   Diagnosis Date    Diabetes mellitus (HCC)     Hypercholesterolemia     Hypertension        Surgical History:  Past Surgical History:   Procedure Laterality Date     SECTION      x2    COLONOSCOPY N/A 2024    COLONOSCOPY POLYPECTOMY SNARE/BIOPSY performed by David Weir MD at [facility] ENDOSCOPY    CYST INCISION AND DRAINAGE      UPPER GASTROINTESTINAL ENDOSCOPY N/A 2024    ESOPHAGOGASTRODUODENOSCOPY BIOPSY performed by David Weir MD at [facility] ENDOSCOPY    UPPER GASTROINTESTINAL ENDOSCOPY N/A 2024    ESOPHAGOGASTRODUODENOSCOPY BIOPSY performed by David Weir MD at [facility] ENDOSCOPY       Allergies:  Allergies   Allergen Reactions    Jardiance [Empagliflozin] Hives    Nifedipine      intolerant-causes dizziness    Sitagliptin Hives     Stopped for bad rash    Ciprofloxacin Rash       Current Meds:  Current Outpatient Medications

## 2025-01-28 ENCOUNTER — TRANSCRIBE ORDERS (OUTPATIENT)
Facility: HOSPITAL | Age: 75
End: 2025-01-28

## 2025-01-28 DIAGNOSIS — Z12.31 ENCOUNTER FOR SCREENING MAMMOGRAM FOR MALIGNANT NEOPLASM OF BREAST: Primary | ICD-10-CM

## 2025-02-04 ENCOUNTER — HOSPITAL ENCOUNTER (OUTPATIENT)
Facility: HOSPITAL | Age: 75
Discharge: HOME | End: 2025-02-07
Payer: MEDICARE

## 2025-02-04 DIAGNOSIS — Z12.31 ENCOUNTER FOR SCREENING MAMMOGRAM FOR MALIGNANT NEOPLASM OF BREAST: ICD-10-CM

## 2025-02-04 PROCEDURE — 77063 BREAST TOMOSYNTHESIS BI: CPT

## 2025-02-25 ENCOUNTER — TELEPHONE (OUTPATIENT)
Age: 75
End: 2025-02-25

## 2025-02-25 DIAGNOSIS — E11.65 TYPE 2 DIABETES MELLITUS WITH HYPERGLYCEMIA, WITHOUT LONG-TERM CURRENT USE OF INSULIN (HCC): Primary | ICD-10-CM

## 2025-02-26 LAB
ALBUMIN SERPL-MCNC: 4.5 G/DL (ref 3.8–4.8)
ALBUMIN/CREAT UR: 76 MG/G CREAT (ref 0–29)
ALP SERPL-CCNC: 188 IU/L (ref 44–121)
ALT SERPL-CCNC: 13 IU/L (ref 0–32)
AST SERPL-CCNC: 12 IU/L (ref 0–40)
BILIRUB SERPL-MCNC: 0.3 MG/DL (ref 0–1.2)
BUN SERPL-MCNC: 22 MG/DL (ref 8–27)
BUN/CREAT SERPL: 14 (ref 12–28)
CALCIUM SERPL-MCNC: 10.1 MG/DL (ref 8.7–10.3)
CHLORIDE SERPL-SCNC: 108 MMOL/L (ref 96–106)
CHOLEST SERPL-MCNC: 175 MG/DL (ref 100–199)
CO2 SERPL-SCNC: 22 MMOL/L (ref 20–29)
CREAT SERPL-MCNC: 1.61 MG/DL (ref 0.57–1)
CREAT UR-MCNC: 85.3 MG/DL
EGFRCR SERPLBLD CKD-EPI 2021: 33 ML/MIN/1.73
GLOBULIN SER CALC-MCNC: 3 G/DL (ref 1.5–4.5)
GLUCOSE SERPL-MCNC: 156 MG/DL (ref 70–99)
HBA1C MFR BLD: 8.7 % (ref 4.8–5.6)
HDLC SERPL-MCNC: 54 MG/DL
LDLC SERPL CALC-MCNC: 103 MG/DL (ref 0–99)
MICROALBUMIN UR-MCNC: 64.6 UG/ML
POTASSIUM SERPL-SCNC: 4.7 MMOL/L (ref 3.5–5.2)
PROT SERPL-MCNC: 7.5 G/DL (ref 6–8.5)
SODIUM SERPL-SCNC: 144 MMOL/L (ref 134–144)
TRIGL SERPL-MCNC: 97 MG/DL (ref 0–149)
VLDLC SERPL CALC-MCNC: 18 MG/DL (ref 5–40)

## 2025-02-27 LAB
IMP & REVIEW OF LAB RESULTS: NORMAL
Lab: NORMAL
REPORT: NORMAL
REPORT: NORMAL

## 2025-02-28 ENCOUNTER — OFFICE VISIT (OUTPATIENT)
Age: 75
End: 2025-02-28
Payer: MEDICARE

## 2025-02-28 VITALS
SYSTOLIC BLOOD PRESSURE: 138 MMHG | HEART RATE: 79 BPM | BODY MASS INDEX: 38.28 KG/M2 | HEIGHT: 62 IN | DIASTOLIC BLOOD PRESSURE: 65 MMHG | RESPIRATION RATE: 20 BRPM | OXYGEN SATURATION: 98 % | TEMPERATURE: 98.4 F | WEIGHT: 208 LBS

## 2025-02-28 DIAGNOSIS — N18.31 CHRONIC KIDNEY DISEASE, STAGE 3A (HCC): ICD-10-CM

## 2025-02-28 DIAGNOSIS — E11.65 TYPE 2 DIABETES MELLITUS WITH HYPERGLYCEMIA, WITHOUT LONG-TERM CURRENT USE OF INSULIN (HCC): Primary | ICD-10-CM

## 2025-02-28 DIAGNOSIS — E66.01 MORBID (SEVERE) OBESITY DUE TO EXCESS CALORIES: ICD-10-CM

## 2025-02-28 DIAGNOSIS — N18.32 STAGE 3B CHRONIC KIDNEY DISEASE (HCC): ICD-10-CM

## 2025-02-28 DIAGNOSIS — I10 ESSENTIAL (PRIMARY) HYPERTENSION: ICD-10-CM

## 2025-02-28 DIAGNOSIS — E78.2 MIXED HYPERLIPIDEMIA: ICD-10-CM

## 2025-02-28 PROCEDURE — G8399 PT W/DXA RESULTS DOCUMENT: HCPCS | Performed by: INTERNAL MEDICINE

## 2025-02-28 PROCEDURE — 1159F MED LIST DOCD IN RCRD: CPT | Performed by: INTERNAL MEDICINE

## 2025-02-28 PROCEDURE — 2022F DILAT RTA XM EVC RTNOPTHY: CPT | Performed by: INTERNAL MEDICINE

## 2025-02-28 PROCEDURE — 3017F COLORECTAL CA SCREEN DOC REV: CPT | Performed by: INTERNAL MEDICINE

## 2025-02-28 PROCEDURE — 3075F SYST BP GE 130 - 139MM HG: CPT | Performed by: INTERNAL MEDICINE

## 2025-02-28 PROCEDURE — 99214 OFFICE O/P EST MOD 30 MIN: CPT | Performed by: INTERNAL MEDICINE

## 2025-02-28 PROCEDURE — 1126F AMNT PAIN NOTED NONE PRSNT: CPT | Performed by: INTERNAL MEDICINE

## 2025-02-28 PROCEDURE — 1090F PRES/ABSN URINE INCON ASSESS: CPT | Performed by: INTERNAL MEDICINE

## 2025-02-28 PROCEDURE — G8417 CALC BMI ABV UP PARAM F/U: HCPCS | Performed by: INTERNAL MEDICINE

## 2025-02-28 PROCEDURE — 3052F HG A1C>EQUAL 8.0%<EQUAL 9.0%: CPT | Performed by: INTERNAL MEDICINE

## 2025-02-28 PROCEDURE — 1123F ACP DISCUSS/DSCN MKR DOCD: CPT | Performed by: INTERNAL MEDICINE

## 2025-02-28 PROCEDURE — G8427 DOCREV CUR MEDS BY ELIG CLIN: HCPCS | Performed by: INTERNAL MEDICINE

## 2025-02-28 PROCEDURE — 1036F TOBACCO NON-USER: CPT | Performed by: INTERNAL MEDICINE

## 2025-02-28 PROCEDURE — 1160F RVW MEDS BY RX/DR IN RCRD: CPT | Performed by: INTERNAL MEDICINE

## 2025-02-28 PROCEDURE — 3078F DIAST BP <80 MM HG: CPT | Performed by: INTERNAL MEDICINE

## 2025-02-28 RX ORDER — METFORMIN HYDROCHLORIDE 500 MG/1
TABLET, EXTENDED RELEASE ORAL
Qty: 360 TABLET | Refills: 3 | Status: SHIPPED | OUTPATIENT
Start: 2025-02-28

## 2025-02-28 RX ORDER — GLIMEPIRIDE 2 MG/1
TABLET ORAL
Qty: 270 TABLET | Refills: 2 | Status: SHIPPED | OUTPATIENT
Start: 2025-02-28

## 2025-02-28 NOTE — PATIENT INSTRUCTIONS
SPECIFIC INSTRUCTIONS BELOW           amaryl   ( glimepiride 2   mg  )   one and half pills   in  AM   and      at   night       bydureon b-cise 2 mg  weekly       metformin er 1 gm twice a day      Farxiga    5 mg  a  day beffore b-fast         -------------PAY ATTENTION TO THESE GENERAL INSTRUCTIONS -----------------      - The medications prescribed at this visit will not be available at pharmacy until 6 pm       - YOUR MED LIST IS NOT UP TO DATE AS SOME CHANGES ARE BEING MADE AFTER THE VISIT - FOLLOW SPECIFIC INSTRUCTIONS  ABOVE     -ANY tests other than blood work, which you opt to do  outside the  [facility] facilities, you are responsible for prior authorizations if  required    - HEALTH MAINTENANCE IS NOT GOING TO BE UP TO DATE ON YOUR AVS- PLEASE IGNORE     Results     *Normal results will not be notified by a phone call starting January 1 2021   *If you have an upcoming visit, the results will be discussed at the visit   *Please sign up for MY CHART if you want access to your lab and test results  *Abnormal results which require immediate attention will be notified by phone call   *Abnormal results which do not require immediate assistance will be notified in 1-2 weeks       Refills    -    have your pharmacy send us a refill request . Refills are done max for one year and a visit is a must before refills are extended    Follow up appointments -  highly encourage you to make it when you are checking out. We can accommodate you into the schedule based on your clinical situation, but not for extending refills beyond a year. Labs are important to give refills and is important to get labs before the visit     Phone calls  -  Allow  24 hrs. for non-urgent calls to be returned  Prior authorization - It may take 2-4 weeks to process  Forms  -  FMLA, DMV etc., will take up to 2 weeks to process  Cancellations - please notify the office 2 days in advance   Samples  - will only be dispensed at visits

## 2025-02-28 NOTE — PROGRESS NOTES
Hilda Rodriguez is a 74 y.o. female here for   Chief Complaint   Patient presents with    Diabetes       1. Have you been to the ER, urgent care clinic since your last visit?  Hospitalized since your last visit? - no    2. Have you seen or consulted any other health care providers outside of the [facility] since your last visit?  Include any pap smears or colon screening.- Eye

## 2025-02-28 NOTE — PROGRESS NOTES
[facility] DIABETES AND ENDOCRINOLOGY                Unique Willis MD FACE       HISTORY OF PRESENT ILLNESS   Hilda Rodriguez is a 74 y.o.  female.      Patient here for   f/u after last  visit of Type 2 diabetes mellitus  From  Oct 2024        She has fasting sugars  - 140 mg   Had eye visit  yesterday          October 2024     She had CARR[facility] fibrosure test   Lost 1 lb  She has not eaten any candy  and she  has done well with diet       April 2024   Gained  4 lbs   She is attending to many [employer]   She is now  with healthy living  course of  6 weeks  ( she says she is trying  to help herself )         Oct 2023     LOST 4 lbs      May 2023      She could not afford bydureon briefly    She saw opthal and pcp in the interim    Could nto get dilated exam because of  redness in the eye    She got anti-fungal cream for skin rash             Old history :    Referred : by self/pcp   Pt of Dr. Cesar    He transferred care as Dr. Cesar left    H/o diabetes for 5  years    Current A1C is 6.9 % from sept 2013  and symptoms/problems include none    Current diabetic medications include glyburide 5 mg 2 pills a day  and victoza 1.8         Review of Systems    Constitutional: gained weight          Physical Exam    Constitutional: She is oriented to person, place, and time. She appears well-developed and well-nourished.    Abdominal: Soft. Bowel sounds are normal.         Labs      Lab Results   Component Value Date    LABA1C 8.7 (H) 02/25/2025    LABA1C 9.6 (H) 10/03/2024    LABA1C 7.2 (H) 04/03/2024       Lab Results   Component Value Date     02/25/2025    K 4.7 02/25/2025     (H) 02/25/2025    CO2 22 02/25/2025    BUN 22 02/25/2025    CREATININE 1.61 (H) 02/25/2025    GLUCOSE 156 (H) 02/25/2025    CALCIUM 10.1 02/25/2025    BILITOT 0.3 02/25/2025    ALKPHOS 188 (H) 02/25/2025    AST 12 02/25/2025    ALT 13 02/25/2025    LABGLOM 33 (L) 02/25/2025    GFRAA 57 (L) 01/19/2022    AGRATIO 1.1

## 2025-03-28 ENCOUNTER — TELEPHONE (OUTPATIENT)
Facility: CLINIC | Age: 75
End: 2025-03-28

## 2025-03-28 NOTE — TELEPHONE ENCOUNTER
Attempted to contact patient regarding upcoming Medicare wellness appointment and completion of HRA questionnaire. Unable to lvm, voice mailbox has not been set up.

## 2025-03-31 ENCOUNTER — TELEPHONE (OUTPATIENT)
Facility: CLINIC | Age: 75
End: 2025-03-31

## 2025-03-31 SDOH — ECONOMIC STABILITY: FOOD INSECURITY: WITHIN THE PAST 12 MONTHS, THE FOOD YOU BOUGHT JUST DIDN'T LAST AND YOU DIDN'T HAVE MONEY TO GET MORE.: NEVER TRUE

## 2025-03-31 SDOH — ECONOMIC STABILITY: FOOD INSECURITY: WITHIN THE PAST 12 MONTHS, YOU WORRIED THAT YOUR FOOD WOULD RUN OUT BEFORE YOU GOT MONEY TO BUY MORE.: NEVER TRUE

## 2025-03-31 ASSESSMENT — PATIENT HEALTH QUESTIONNAIRE - PHQ9
1. LITTLE INTEREST OR PLEASURE IN DOING THINGS: NOT AT ALL
SUM OF ALL RESPONSES TO PHQ QUESTIONS 1-9: 0
SUM OF ALL RESPONSES TO PHQ QUESTIONS 1-9: 0
2. FEELING DOWN, DEPRESSED OR HOPELESS: NOT AT ALL
SUM OF ALL RESPONSES TO PHQ QUESTIONS 1-9: 0
SUM OF ALL RESPONSES TO PHQ QUESTIONS 1-9: 0

## 2025-03-31 ASSESSMENT — ANXIETY QUESTIONNAIRES
7. FEELING AFRAID AS IF SOMETHING AWFUL MIGHT HAPPEN: NOT AT ALL
5. BEING SO RESTLESS THAT IT IS HARD TO SIT STILL: NOT AT ALL
IF YOU CHECKED OFF ANY PROBLEMS ON THIS QUESTIONNAIRE, HOW DIFFICULT HAVE THESE PROBLEMS MADE IT FOR YOU TO DO YOUR WORK, TAKE CARE OF THINGS AT HOME, OR GET ALONG WITH OTHER PEOPLE: NOT DIFFICULT AT ALL
3. WORRYING TOO MUCH ABOUT DIFFERENT THINGS: NOT AT ALL
1. FEELING NERVOUS, ANXIOUS, OR ON EDGE: NOT AT ALL
GAD7 TOTAL SCORE: 0
6. BECOMING EASILY ANNOYED OR IRRITABLE: NOT AT ALL
2. NOT BEING ABLE TO STOP OR CONTROL WORRYING: NOT AT ALL
4. TROUBLE RELAXING: NOT AT ALL

## 2025-04-01 ENCOUNTER — OFFICE VISIT (OUTPATIENT)
Facility: CLINIC | Age: 75
End: 2025-04-01

## 2025-04-01 VITALS
SYSTOLIC BLOOD PRESSURE: 138 MMHG | WEIGHT: 208 LBS | OXYGEN SATURATION: 98 % | BODY MASS INDEX: 38.28 KG/M2 | HEART RATE: 68 BPM | DIASTOLIC BLOOD PRESSURE: 84 MMHG | HEIGHT: 62 IN

## 2025-04-01 DIAGNOSIS — Z00.00 MEDICARE ANNUAL WELLNESS VISIT, SUBSEQUENT: Primary | ICD-10-CM

## 2025-04-01 DIAGNOSIS — Z11.59 NEED FOR HEPATITIS C SCREENING TEST: ICD-10-CM

## 2025-04-01 DIAGNOSIS — I10 ESSENTIAL HYPERTENSION, BENIGN: ICD-10-CM

## 2025-04-01 DIAGNOSIS — N18.32 STAGE 3B CHRONIC KIDNEY DISEASE (HCC): ICD-10-CM

## 2025-04-01 DIAGNOSIS — E78.00 HYPERCHOLESTEROLEMIA: ICD-10-CM

## 2025-04-01 DIAGNOSIS — E55.9 VITAMIN D DEFICIENCY: ICD-10-CM

## 2025-04-01 DIAGNOSIS — K76.0 HEPATIC STEATOSIS: ICD-10-CM

## 2025-04-01 DIAGNOSIS — E11.21 TYPE 2 DIABETES MELLITUS WITH DIABETIC NEPHROPATHY, WITHOUT LONG-TERM CURRENT USE OF INSULIN (HCC): ICD-10-CM

## 2025-04-01 RX ORDER — TETANUS TOXOID, REDUCED DIPHTHERIA TOXOID AND ACELLULAR PERTUSSIS VACCINE, ADSORBED 5; 2.5; 8; 8; 2.5 [IU]/.5ML; [IU]/.5ML; UG/.5ML; UG/.5ML; UG/.5ML
0.5 SUSPENSION INTRAMUSCULAR ONCE
Qty: 1 EACH | Refills: 0 | Status: SHIPPED | OUTPATIENT
Start: 2025-04-01 | End: 2025-04-01

## 2025-04-01 ASSESSMENT — PATIENT HEALTH QUESTIONNAIRE - PHQ9
SUM OF ALL RESPONSES TO PHQ QUESTIONS 1-9: 0
2. FEELING DOWN, DEPRESSED OR HOPELESS: NOT AT ALL
SUM OF ALL RESPONSES TO PHQ QUESTIONS 1-9: 0
1. LITTLE INTEREST OR PLEASURE IN DOING THINGS: NOT AT ALL

## 2025-04-01 ASSESSMENT — ANXIETY QUESTIONNAIRES
GAD7 TOTAL SCORE: 0
7. FEELING AFRAID AS IF SOMETHING AWFUL MIGHT HAPPEN: NOT AT ALL
IF YOU CHECKED OFF ANY PROBLEMS ON THIS QUESTIONNAIRE, HOW DIFFICULT HAVE THESE PROBLEMS MADE IT FOR YOU TO DO YOUR WORK, TAKE CARE OF THINGS AT HOME, OR GET ALONG WITH OTHER PEOPLE: NOT DIFFICULT AT ALL
6. BECOMING EASILY ANNOYED OR IRRITABLE: NOT AT ALL
1. FEELING NERVOUS, ANXIOUS, OR ON EDGE: NOT AT ALL
4. TROUBLE RELAXING: NOT AT ALL
2. NOT BEING ABLE TO STOP OR CONTROL WORRYING: NOT AT ALL
3. WORRYING TOO MUCH ABOUT DIFFERENT THINGS: NOT AT ALL
5. BEING SO RESTLESS THAT IT IS HARD TO SIT STILL: NOT AT ALL

## 2025-04-27 DIAGNOSIS — E11.65 TYPE 2 DIABETES MELLITUS WITH HYPERGLYCEMIA (HCC): ICD-10-CM

## 2025-04-27 DIAGNOSIS — I10 ESSENTIAL (PRIMARY) HYPERTENSION: ICD-10-CM

## 2025-04-28 RX ORDER — GLIMEPIRIDE 4 MG/1
TABLET ORAL
Qty: 90 TABLET | Refills: 3 | OUTPATIENT
Start: 2025-04-28

## 2025-04-28 RX ORDER — DAPAGLIFLOZIN 5 MG/1
5 TABLET, FILM COATED ORAL
Qty: 90 TABLET | Refills: 3 | Status: ACTIVE | OUTPATIENT
Start: 2025-04-28

## 2025-04-28 RX ORDER — AMLODIPINE BESYLATE 10 MG/1
10 TABLET ORAL DAILY
Qty: 90 TABLET | Refills: 3 | Status: SHIPPED | OUTPATIENT
Start: 2025-04-28

## 2025-04-29 DIAGNOSIS — E11.65 TYPE 2 DIABETES MELLITUS WITH HYPERGLYCEMIA (HCC): ICD-10-CM

## 2025-04-29 DIAGNOSIS — E78.2 MIXED HYPERLIPIDEMIA: ICD-10-CM

## 2025-04-29 RX ORDER — ATORVASTATIN CALCIUM 40 MG/1
40 TABLET, FILM COATED ORAL NIGHTLY
Qty: 90 TABLET | Refills: 4 | Status: SHIPPED | OUTPATIENT
Start: 2025-04-29

## 2025-05-05 ENCOUNTER — TELEPHONE (OUTPATIENT)
Age: 75
End: 2025-05-05

## 2025-05-05 DIAGNOSIS — E78.2 MIXED HYPERLIPIDEMIA: ICD-10-CM

## 2025-05-05 DIAGNOSIS — E11.65 TYPE 2 DIABETES MELLITUS WITH HYPERGLYCEMIA (HCC): ICD-10-CM

## 2025-05-05 NOTE — TELEPHONE ENCOUNTER
Bydureon is not available per pharmacy, Asked patient if they just don't have in stock are hard to get. Patient stated they acted if they cannot get in stock and advised her to contact office to prescribe alternative though did not offer covered alternative to prescribe

## 2025-05-05 NOTE — TELEPHONE ENCOUNTER
Spoke with pharmacist and they stated that no [facility] within [place] [facility] has the medication. I called the pt and informed her that and she would like to stay on the medicine so I told her she could call other pharmacies and see if they have it. She will call me back if she can not find it and wants something else. She has not taken for 2 weeks

## 2025-05-14 RX ORDER — ATORVASTATIN CALCIUM 40 MG/1
40 TABLET, FILM COATED ORAL NIGHTLY
Qty: 90 TABLET | Refills: 3 | Status: SHIPPED | OUTPATIENT
Start: 2025-05-14

## 2025-06-11 NOTE — TELEPHONE ENCOUNTER
Hi,    Pt wants to know if there's an alternative medication she can be prescribed as she is unable to locate a pharmacy that carries rx: BYDUREON? Pt says, her blood levels are in the 200's now. Please return call.     #[phone]    Thank you.

## 2025-06-12 DIAGNOSIS — E11.65 TYPE 2 DIABETES MELLITUS WITH HYPERGLYCEMIA, WITHOUT LONG-TERM CURRENT USE OF INSULIN (HCC): Primary | ICD-10-CM

## 2025-06-12 DIAGNOSIS — E78.2 MIXED HYPERLIPIDEMIA: ICD-10-CM

## 2025-06-12 DIAGNOSIS — I10 ESSENTIAL (PRIMARY) HYPERTENSION: ICD-10-CM

## 2025-06-12 NOTE — TELEPHONE ENCOUNTER
Informed pt of Dr. Willis's note and of her new rx Mounjaro sent to [facility] [address]. Pt understood and had no further questions.

## 2025-06-12 NOTE — TELEPHONE ENCOUNTER
Tell pt that I changed her to mounjaro weekly in place of bydureon     She has to call us in a month if she tolerates it      Unique Willis MD

## 2025-06-23 ENCOUNTER — LAB (OUTPATIENT)
Age: 75
End: 2025-06-23

## 2025-06-23 DIAGNOSIS — E78.2 MIXED HYPERLIPIDEMIA: ICD-10-CM

## 2025-06-23 DIAGNOSIS — I10 ESSENTIAL (PRIMARY) HYPERTENSION: ICD-10-CM

## 2025-06-23 DIAGNOSIS — E11.65 TYPE 2 DIABETES MELLITUS WITH HYPERGLYCEMIA, WITHOUT LONG-TERM CURRENT USE OF INSULIN (HCC): ICD-10-CM

## 2025-06-23 DIAGNOSIS — E66.01 MORBID (SEVERE) OBESITY DUE TO EXCESS CALORIES (HCC): ICD-10-CM

## 2025-06-23 LAB
ALBUMIN SERPL-MCNC: 4 G/DL (ref 3.5–5)
ALBUMIN/GLOB SERPL: 1.1 (ref 1.1–2.2)
ALP SERPL-CCNC: 270 U/L (ref 45–117)
ALT SERPL-CCNC: 21 U/L (ref 12–78)
ANION GAP SERPL CALC-SCNC: 7 MMOL/L (ref 2–12)
AST SERPL-CCNC: 8 U/L (ref 15–37)
BILIRUB SERPL-MCNC: 0.3 MG/DL (ref 0.2–1)
BUN SERPL-MCNC: 28 MG/DL (ref 6–20)
BUN/CREAT SERPL: 15 (ref 12–20)
CALCIUM SERPL-MCNC: 10 MG/DL (ref 8.5–10.1)
CHLORIDE SERPL-SCNC: 109 MMOL/L (ref 97–108)
CHOLEST SERPL-MCNC: 163 MG/DL
CO2 SERPL-SCNC: 26 MMOL/L (ref 21–32)
CREAT SERPL-MCNC: 1.82 MG/DL (ref 0.55–1.02)
CREAT UR-MCNC: 55.1 MG/DL
EST. AVERAGE GLUCOSE BLD GHB EST-MCNC: 249 MG/DL
GLOBULIN SER CALC-MCNC: 3.6 G/DL (ref 2–4)
GLUCOSE SERPL-MCNC: 394 MG/DL (ref 65–100)
HBA1C MFR BLD: 10.3 % (ref 4–5.6)
HDLC SERPL-MCNC: 64 MG/DL
HDLC SERPL: 2.5 (ref 0–5)
LDLC SERPL CALC-MCNC: 80.6 MG/DL (ref 0–100)
MICROALBUMIN UR-MCNC: 6.85 MG/DL
MICROALBUMIN/CREAT UR-RTO: 124 MG/G (ref 0–30)
POTASSIUM SERPL-SCNC: 4.7 MMOL/L (ref 3.5–5.1)
PROT SERPL-MCNC: 7.6 G/DL (ref 6.4–8.2)
SODIUM SERPL-SCNC: 142 MMOL/L (ref 136–145)
TRIGL SERPL-MCNC: 92 MG/DL
VLDLC SERPL CALC-MCNC: 18.4 MG/DL

## 2025-06-30 ENCOUNTER — OFFICE VISIT (OUTPATIENT)
Age: 75
End: 2025-06-30
Payer: MEDICARE

## 2025-06-30 VITALS
TEMPERATURE: 98.2 F | WEIGHT: 204.4 LBS | DIASTOLIC BLOOD PRESSURE: 66 MMHG | HEIGHT: 62 IN | SYSTOLIC BLOOD PRESSURE: 134 MMHG | OXYGEN SATURATION: 98 % | BODY MASS INDEX: 37.61 KG/M2 | HEART RATE: 82 BPM

## 2025-06-30 DIAGNOSIS — N76.0 ACUTE VAGINITIS: Primary | ICD-10-CM

## 2025-06-30 DIAGNOSIS — E11.65 TYPE 2 DIABETES MELLITUS WITH HYPERGLYCEMIA, WITHOUT LONG-TERM CURRENT USE OF INSULIN (HCC): ICD-10-CM

## 2025-06-30 DIAGNOSIS — N18.4 CKD STAGE 4 DUE TO TYPE 2 DIABETES MELLITUS (HCC): ICD-10-CM

## 2025-06-30 DIAGNOSIS — N18.31 CHRONIC KIDNEY DISEASE, STAGE 3A (HCC): ICD-10-CM

## 2025-06-30 DIAGNOSIS — E11.22 CKD STAGE 4 DUE TO TYPE 2 DIABETES MELLITUS (HCC): ICD-10-CM

## 2025-06-30 PROCEDURE — G8399 PT W/DXA RESULTS DOCUMENT: HCPCS | Performed by: INTERNAL MEDICINE

## 2025-06-30 PROCEDURE — 1090F PRES/ABSN URINE INCON ASSESS: CPT | Performed by: INTERNAL MEDICINE

## 2025-06-30 PROCEDURE — 1160F RVW MEDS BY RX/DR IN RCRD: CPT | Performed by: INTERNAL MEDICINE

## 2025-06-30 PROCEDURE — 2022F DILAT RTA XM EVC RTNOPTHY: CPT | Performed by: INTERNAL MEDICINE

## 2025-06-30 PROCEDURE — 1123F ACP DISCUSS/DSCN MKR DOCD: CPT | Performed by: INTERNAL MEDICINE

## 2025-06-30 PROCEDURE — 3017F COLORECTAL CA SCREEN DOC REV: CPT | Performed by: INTERNAL MEDICINE

## 2025-06-30 PROCEDURE — G8427 DOCREV CUR MEDS BY ELIG CLIN: HCPCS | Performed by: INTERNAL MEDICINE

## 2025-06-30 PROCEDURE — G8417 CALC BMI ABV UP PARAM F/U: HCPCS | Performed by: INTERNAL MEDICINE

## 2025-06-30 PROCEDURE — 99215 OFFICE O/P EST HI 40 MIN: CPT | Performed by: INTERNAL MEDICINE

## 2025-06-30 PROCEDURE — 1159F MED LIST DOCD IN RCRD: CPT | Performed by: INTERNAL MEDICINE

## 2025-06-30 PROCEDURE — 3046F HEMOGLOBIN A1C LEVEL >9.0%: CPT | Performed by: INTERNAL MEDICINE

## 2025-06-30 PROCEDURE — 3075F SYST BP GE 130 - 139MM HG: CPT | Performed by: INTERNAL MEDICINE

## 2025-06-30 PROCEDURE — 3078F DIAST BP <80 MM HG: CPT | Performed by: INTERNAL MEDICINE

## 2025-06-30 PROCEDURE — 1036F TOBACCO NON-USER: CPT | Performed by: INTERNAL MEDICINE

## 2025-06-30 PROCEDURE — 1126F AMNT PAIN NOTED NONE PRSNT: CPT | Performed by: INTERNAL MEDICINE

## 2025-06-30 RX ORDER — METFORMIN HYDROCHLORIDE 500 MG/1
TABLET, EXTENDED RELEASE ORAL
Qty: 30 TABLET | Refills: 11 | Status: SHIPPED | OUTPATIENT
Start: 2025-06-30

## 2025-06-30 RX ORDER — FLUCONAZOLE 150 MG/1
150 TABLET ORAL ONCE
Qty: 1 TABLET | Refills: 0 | Status: SHIPPED | OUTPATIENT
Start: 2025-06-30 | End: 2025-06-30

## 2025-06-30 NOTE — PROGRESS NOTES
Hilda Rodriguez is a 74 y.o. female here for   Chief Complaint   Patient presents with    Diabetes       1. Have you been to the ER, urgent care clinic since your last visit?  Hospitalized since your last visit? - no    2. Have you seen or consulted any other health care providers outside of the [facility] since your last visit?  Include any pap smears or colon screening.- no

## 2025-06-30 NOTE — PATIENT INSTRUCTIONS
SPECIFIC INSTRUCTIONS BELOW           amaryl    3 mg  ( glimepiride 2   mg  )   one and half pills   in  AM   and    same     at   night        Mounjaro   2.5  mg  weekly       metformin er 500 mg one pill a day      Farxiga    5 mg  a  day before b-fast         -------------PAY ATTENTION TO THESE GENERAL INSTRUCTIONS -----------------      - The medications prescribed at this visit will not be available at pharmacy until 6 pm       - YOUR MED LIST IS NOT UP TO DATE AS SOME CHANGES ARE BEING MADE AFTER THE VISIT - FOLLOW SPECIFIC INSTRUCTIONS  ABOVE     -ANY tests other than blood work, which you opt to do  outside the  [facility] imaging facilities, you are responsible for prior authorizations if  required    - HEALTH MAINTENANCE IS NOT GOING TO BE UP TO DATE ON YOUR AVS- PLEASE IGNORE     Results     *Normal results will not be notified by a phone call starting January 1 2021   *If you have an upcoming visit, the results will be discussed at the visit   *Please sign up for MY CHART if you want access to your lab and test results  *Abnormal results which require immediate attention will be notified by phone call   *Abnormal results which do not require immediate assistance will be notified in 1-2 weeks       Refills    -    have your pharmacy send us a refill request . Refills are done max for one year and a visit is a must before refills are extended    Follow up appointments -  highly encourage you to make it when you are checking out. We can accommodate you into the schedule based on your clinical situation, but not for extending refills beyond a year. Labs are important to give refills and is important to get labs before the visit     Phone calls  -  Allow  24 hrs. for non-urgent calls to be returned  Prior authorization - It may take 2-4 weeks to process  Forms  -  FMLA, DMV etc., will take up to 2 weeks to process  Cancellations - please notify the office 2 days in advance   Samples  - will only be

## 2025-06-30 NOTE — PROGRESS NOTES
[facility] DIABETES AND ENDOCRINOLOGY                Unique Willis MD FACE       HISTORY OF PRESENT ILLNESS   Hilda Rodriguez is a 74 y.o.  female.      Patient here for   f/u after last  visit of Type 2 diabetes mellitus  From  feb 2025     C/o  hyperglycemic symptoms  - dry mouth and  polyuria   Could not  find bydureon,   and finally taking mounjaro     Lost weight 6 lbs      Feb 2025    She has fasting sugars  - 140 mg   Had eye visit  yesterday        October 2024     She had [facility] fibrosure test   Lost 1 lb  She has not eaten any candy  and she  has done well with diet       April 2024   Gained  4 lbs   She is attending to many [employer]   She is now  with healthy living  course of  6 weeks  ( she says she is trying  to help herself )         Oct 2023     LOST 4 lbs      May 2023      She could not afford bydureon briefly    She saw opthal and pcp in the interim    Could nto get dilated exam because of  redness in the eye    She got anti-fungal cream for skin rash             Old history :    Referred : by self/pcp   Pt of Dr. Cesar    He transferred care as Dr. Cesar left    H/o diabetes for 5  years    Current A1C is 6.9 % from sept 2013  and symptoms/problems include none    Current diabetic medications include glyburide 5 mg 2 pills a day  and victoza 1.8         Review of Systems    Constitutional: as above          Physical Exam    Constitutional: She is oriented to person, place, and time. She appears well-developed and well-nourished.    Abdominal: constipation        Labs    Lab Results   Component Value Date    LABA1C 10.3 (H) 06/23/2025    LABA1C 8.7 (H) 02/25/2025    LABA1C 9.6 (H) 10/03/2024       Lab Results   Component Value Date     06/23/2025    K 4.7 06/23/2025     (H) 06/23/2025    CO2 26 06/23/2025    BUN 28 (H) 06/23/2025    CREATININE 1.82 (H) 06/23/2025    GLUCOSE 394 (H) 06/23/2025    CALCIUM 10.0 06/23/2025    BILITOT 0.3 06/23/2025    ALKPHOS 270 (H)

## 2025-08-01 ENCOUNTER — OFFICE VISIT (OUTPATIENT)
Facility: CLINIC | Age: 75
End: 2025-08-01

## 2025-08-01 VITALS
BODY MASS INDEX: 35.97 KG/M2 | HEART RATE: 73 BPM | OXYGEN SATURATION: 100 % | SYSTOLIC BLOOD PRESSURE: 132 MMHG | WEIGHT: 203 LBS | HEIGHT: 63 IN | RESPIRATION RATE: 16 BRPM | DIASTOLIC BLOOD PRESSURE: 68 MMHG

## 2025-08-01 DIAGNOSIS — I10 ESSENTIAL HYPERTENSION, BENIGN: Primary | ICD-10-CM

## 2025-08-01 DIAGNOSIS — Z11.59 ENCOUNTER FOR HEPATITIS C SCREENING TEST FOR LOW RISK PATIENT: ICD-10-CM

## 2025-08-01 DIAGNOSIS — N18.32 STAGE 3B CHRONIC KIDNEY DISEASE (HCC): ICD-10-CM

## 2025-08-01 DIAGNOSIS — Z79.4 TYPE 2 DIABETES MELLITUS WITH STAGE 3B CHRONIC KIDNEY DISEASE, WITH LONG-TERM CURRENT USE OF INSULIN (HCC): ICD-10-CM

## 2025-08-01 DIAGNOSIS — E11.22 TYPE 2 DIABETES MELLITUS WITH STAGE 3B CHRONIC KIDNEY DISEASE, WITH LONG-TERM CURRENT USE OF INSULIN (HCC): ICD-10-CM

## 2025-08-01 DIAGNOSIS — E55.9 VITAMIN D DEFICIENCY: ICD-10-CM

## 2025-08-01 DIAGNOSIS — E78.00 HYPERCHOLESTEROLEMIA: ICD-10-CM

## 2025-08-01 DIAGNOSIS — Z78.0 MENOPAUSE: ICD-10-CM

## 2025-08-01 DIAGNOSIS — R09.89 DECREASED DORSALIS PEDIS PULSE: ICD-10-CM

## 2025-08-01 DIAGNOSIS — N18.32 TYPE 2 DIABETES MELLITUS WITH STAGE 3B CHRONIC KIDNEY DISEASE, WITH LONG-TERM CURRENT USE OF INSULIN (HCC): ICD-10-CM

## 2025-08-01 RX ORDER — TETANUS TOXOID, REDUCED DIPHTHERIA TOXOID AND ACELLULAR PERTUSSIS VACCINE, ADSORBED 5; 2.5; 8; 8; 2.5 [IU]/.5ML; [IU]/.5ML; UG/.5ML; UG/.5ML; UG/.5ML
0.5 SUSPENSION INTRAMUSCULAR ONCE
Qty: 1 EACH | Refills: 0 | Status: SHIPPED | OUTPATIENT
Start: 2025-08-01 | End: 2025-08-01

## 2025-08-01 ASSESSMENT — ENCOUNTER SYMPTOMS
VOMITING: 0
COUGH: 0
ABDOMINAL PAIN: 0
NAUSEA: 0
SHORTNESS OF BREATH: 0
DIARRHEA: 0

## 2025-08-01 NOTE — PROGRESS NOTES
Chief Complaint   Patient presents with    Follow-up    Hypertension     /68 (BP Site: Right Upper Arm, Patient Position: Sitting, BP Cuff Size: Large Adult)   Pulse 73   Resp 16   Ht 1.588 m (5' 2.5\")   Wt 92.1 kg (203 lb)   SpO2 100%   BMI 36.54 kg/m²         Have you been to the ER, urgent care clinic since your last visit?  Hospitalized since your last visit?   NO    Have you seen or consulted any other health care providers outside our system since your last visit?   NO      “Have you had a diabetic eye exam?”    NO     Date of last diabetic eye exam: 2/5/2018          
10 years since last one  - Advised to get influenza vaccine during flu season in 09/2025  - Tetanus vaccine will be sent to pharmacy as she is due for it    6. Gastroesophageal Reflux Disease:  - Currently taking omeprazole 40 mg for acid management and is stable.    7. Vitamin D Deficiency:  - Taking vitamin D capsule once a week  - Vitamin D levels will be checked during next blood test and to continue vitamin D    8. Bunion:  - Bunion on left big toe, present for some time  - Had bunion removed from right foot in 2003  - If not bothering, can leave it alone especially with diabetes  - When diabetes gets controlled, can consider removal    9. Poor Circulation:  -  PVR abnormal in 10/2024.Poor circulation in legs, likely due to diabetes  - Referral to Dr. Wesley will be made for further evaluation and management    Follow-up:  - Patient will follow up in 4 months    1. Essential hypertension, benign  2. Hypercholesterolemia  -     CBC with Auto Differential; Future  -     Uric Acid; Future  -     TSH; Future  3. Type 2 diabetes mellitus with stage 3b chronic kidney disease, with long-term current use of insulin (HCC)  4. Stage 3b chronic kidney disease (HCC)  -     CBC with Auto Differential; Future  -     Uric Acid; Future  5. Vitamin D deficiency  -     Vitamin D 25 Hydroxy; Future  6. Menopause  -     DEXA BONE DENSITY AXIAL SKELETON; Future  7. Decreased dorsalis pedis pulse  -     BS - Ruben Wesley MD, Vascular Surgery, [place]  8. Encounter for hepatitis C screening test for low risk patient  -     Hepatitis C Antibody; Future      Orders Placed This Encounter    DEXA BONE DENSITY AXIAL SKELETON     Standing Status:   Future     Expected Date:   8/1/2025     Expiration Date:   8/1/2026    CBC with Auto Differential     Standing Status:   Future     Expected Date:   8/1/2025     Expiration Date:   8/1/2026    Hepatitis C Antibody     Standing Status:   Future     Expected Date:   8/1/2025     Expiration Date:

## (undated) DEVICE — FORCEPS BX L240CM JAW DIA2.8MM L CAP W/ NDL MIC MESH TOOTH

## (undated) DEVICE — BITEBLOCK 54FR W/ DENT RIM BLOX

## (undated) DEVICE — IV START KIT: Brand: MEDLINE

## (undated) DEVICE — CUFF BLD PRSS AD CLTH SGL TB W/ BAYNT CONN ROUNDED CORNER

## (undated) DEVICE — ENDOSCOPIC KIT COMPLIANCE ENDOKIT

## (undated) DEVICE — SET GRAV CK VLV NEEDLESS ST 3 GANGED 4WAY STPCOCK HI FLO 10

## (undated) DEVICE — COVIDIEN KENDALL DL DISPOSABLE 3 LEAD SY: Brand: MEDLINE RENEWAL

## (undated) DEVICE — SNARE ENDOSCP AD L240CM LOOP W10MM SHTH DIA2.4MM RND INSUL

## (undated) DEVICE — TRAP ENDOSCP POLYP 2 CHMBR DRAWER TYP

## (undated) DEVICE — TIP SUCT TRNSPAR RIB SURF STD BLB RIG NVENT W/ 5IN1 CONN DYND50138] MEDLINE INDUSTRIES INC]

## (undated) DEVICE — CONTAINER SPEC 20 ML LID NEUT BUFF FORMALIN 10 % POLYPR STS

## (undated) DEVICE — FORCEP BX LG CAP 2.4 MMX120 CM W/ NDL YEL RADIAL JAW 4 DISP

## (undated) DEVICE — CATHETER IV 20GA L1.16IN OD1.0414-1.1176MM ID0.762-0.8382MM